# Patient Record
Sex: FEMALE | Race: OTHER | HISPANIC OR LATINO | URBAN - METROPOLITAN AREA
[De-identification: names, ages, dates, MRNs, and addresses within clinical notes are randomized per-mention and may not be internally consistent; named-entity substitution may affect disease eponyms.]

---

## 2020-03-02 ENCOUNTER — OUTPATIENT (OUTPATIENT)
Dept: INPATIENT UNIT | Facility: HOSPITAL | Age: 25
LOS: 1 days | Discharge: HOME | End: 2020-03-02
Payer: MEDICAID

## 2020-03-02 ENCOUNTER — ASOB RESULT (OUTPATIENT)
Age: 25
End: 2020-03-02

## 2020-03-02 ENCOUNTER — APPOINTMENT (OUTPATIENT)
Dept: ANTEPARTUM | Facility: HOSPITAL | Age: 25
End: 2020-03-02

## 2020-03-02 ENCOUNTER — EMERGENCY (EMERGENCY)
Facility: HOSPITAL | Age: 25
LOS: 1 days | Discharge: STILL ADMITTED OR ADMINISTRATIVE | End: 2020-03-02
Admitting: EMERGENCY MEDICINE

## 2020-03-02 VITALS
RESPIRATION RATE: 18 BRPM | DIASTOLIC BLOOD PRESSURE: 69 MMHG | SYSTOLIC BLOOD PRESSURE: 115 MMHG | HEART RATE: 90 BPM | TEMPERATURE: 98 F | OXYGEN SATURATION: 100 %

## 2020-03-02 VITALS
TEMPERATURE: 99 F | RESPIRATION RATE: 18 BRPM | SYSTOLIC BLOOD PRESSURE: 107 MMHG | DIASTOLIC BLOOD PRESSURE: 54 MMHG | HEART RATE: 84 BPM

## 2020-03-02 VITALS — HEART RATE: 80 BPM | DIASTOLIC BLOOD PRESSURE: 58 MMHG | SYSTOLIC BLOOD PRESSURE: 103 MMHG

## 2020-03-02 DIAGNOSIS — Z3A.00 WEEKS OF GESTATION OF PREGNANCY NOT SPECIFIED: ICD-10-CM

## 2020-03-02 DIAGNOSIS — O26.899 OTHER SPECIFIED PREGNANCY RELATED CONDITIONS, UNSPECIFIED TRIMESTER: ICD-10-CM

## 2020-03-02 PROBLEM — Z00.00 ENCOUNTER FOR PREVENTIVE HEALTH EXAMINATION: Status: ACTIVE | Noted: 2020-03-02

## 2020-03-02 LAB
ANTIBODY ID 1_1: SIGNIFICANT CHANGE UP
BLD GP AB SCN SERPL QL: POSITIVE — SIGNIFICANT CHANGE UP
DAT POLY-SP REAG RBC QL: NEGATIVE — SIGNIFICANT CHANGE UP
HCG SERPL-ACNC: 2466 MIU/ML — SIGNIFICANT CHANGE UP
HCT VFR BLD CALC: 41.9 % — SIGNIFICANT CHANGE UP (ref 34.5–45)
HGB BLD-MCNC: 14.3 G/DL — SIGNIFICANT CHANGE UP (ref 11.5–15.5)
MCHC RBC-ENTMCNC: 32.6 PG — SIGNIFICANT CHANGE UP (ref 27–34)
MCHC RBC-ENTMCNC: 34.1 % — SIGNIFICANT CHANGE UP (ref 32–36)
MCV RBC AUTO: 95.7 FL — SIGNIFICANT CHANGE UP (ref 80–100)
NRBC # FLD: 0 K/UL — SIGNIFICANT CHANGE UP (ref 0–0)
PLATELET # BLD AUTO: 215 K/UL — SIGNIFICANT CHANGE UP (ref 150–400)
PMV BLD: 11.2 FL — SIGNIFICANT CHANGE UP (ref 7–13)
RBC # BLD: 4.38 M/UL — SIGNIFICANT CHANGE UP (ref 3.8–5.2)
RBC # FLD: 11.3 % — SIGNIFICANT CHANGE UP (ref 10.3–14.5)
RH IG SCN BLD-IMP: POSITIVE — SIGNIFICANT CHANGE UP
WBC # BLD: 7.8 K/UL — SIGNIFICANT CHANGE UP (ref 3.8–10.5)
WBC # FLD AUTO: 7.8 K/UL — SIGNIFICANT CHANGE UP (ref 3.8–10.5)

## 2020-03-02 PROCEDURE — 86077 PHYS BLOOD BANK SERV XMATCH: CPT

## 2020-03-02 PROCEDURE — 99203 OFFICE O/P NEW LOW 30 MIN: CPT

## 2020-03-02 NOTE — OB RN TRIAGE NOTE - PRO MENTAL HEALTH SX RECENT
none feeling depressed/sad/Pt states she feels sad at times because she just moved to this country and she misses her family.  Pt lives with her .

## 2020-03-02 NOTE — ED ADULT TRIAGE NOTE - LANGUAGE ASSISTANCE NEEDED
Pt awake and resting quietly. ID verified. BS clear bilaterally with no increased work of breathing noted. HR improving. MD advised. Awaiting discharge. No-Patient/Caregiver offered and refused free interpretation services.

## 2020-03-02 NOTE — OB PROVIDER TRIAGE NOTE - NSOBPROVIDERNOTE_OBGYN_ALL_OB_FT
24 year old female P0  at Missed AB  8-10 weeks    admitted for DVC     labs CBC BHCG  TXS sent    pt seen with Dr Jara 24 year old female P0  at Missed AB  8-10 weeks    admitted for DVC     labs CBC BHCG  TXS sent    pt seen with Dr Jara and  24 year old female P0  at Missed AB  8-10 weeks    admitted for DVC     labs CBC BHCG  TXS sent    pt seen with Dr Jara and     1725    Pt for discharge home with instructions to follow up in PCAP clinic tomorrow 3/3/2020 @ 4pm per Dr Ulloa.  precautions reviewed.    Joey, NP

## 2020-03-02 NOTE — OB PROVIDER TRIAGE NOTE - HISTORY OF PRESENT ILLNESS
24 year old female LMP 11/7/19 EDC 8/13/2020 at 16.4 wks who presents from ER for spotting which initially started yesterday and denied any heavy VB or passing clots or tissue denied any cramping or abd pains   stated last seen at [facility] on 12/31 and told was 6 weeks  given EDC 8/13/2020  has not had any recent follow up     stated that [place] and recently   ( FOB with patient )    translating for patient    also hx obtained by medical staff who speaks Nigerien    stated that she no longer feels pregnancy s/s and denied any fetal movements    also stated that she feels depressed /sad  since coming to [facility] [place]     denied any SI/HI ideations      med hx denied   surghx denied   NKDA   OB hx P0   gyn hx denied

## 2020-03-02 NOTE — OB PROVIDER TRIAGE NOTE - ADDITIONAL INSTRUCTIONS
Please drink 1-2 liters of fluid per day. Please go to your scheduled appointment in the prenatal PCAP clinic tomorrow 3/3/2020 at 4pm.

## 2020-03-02 NOTE — OB PROVIDER TRIAGE NOTE - NSHPPHYSICALEXAM_GEN_ALL_CORE
pt seen and examined    ICU Vital Signs Last 24 Hrs  T(C): 37.3 (02 Mar 2020 14:33), Max: 37.3 (02 Mar 2020 14:19)  T(F): 99.14 (02 Mar 2020 14:33), Max: 99.14 (02 Mar 2020 14:33)  HR: 80 (02 Mar 2020 16:05) (65 - 90)  BP: 103/58 (02 Mar 2020 16:05) (103/58 - 115/69)  BP(mean): --  ABP: --  ABP(mean): --  RR: 18 (02 Mar 2020 14:19) (18 - 18)  SpO2: 100% (02 Mar 2020 13:48) (100% - 100%)    pt alert    lungs clear   heart S1 S2   abd soft  nontender  not felt to be 16 w size uterus    abd scan   IUP seen sac measuring 10mm  no cardiac activity c/w missed ab     scan by ATU and Dr Babcock   ve mild VB   cx closed     pt counselled by Dr Babcock and Dr Ulloa

## 2020-03-03 ENCOUNTER — APPOINTMENT (OUTPATIENT)
Dept: OBGYN | Facility: HOSPITAL | Age: 25
End: 2020-03-03
Payer: MEDICAID

## 2020-03-03 ENCOUNTER — OUTPATIENT (OUTPATIENT)
Dept: OUTPATIENT SERVICES | Facility: HOSPITAL | Age: 25
LOS: 1 days | End: 2020-03-03

## 2020-03-03 VITALS
SYSTOLIC BLOOD PRESSURE: 121 MMHG | WEIGHT: 133 LBS | DIASTOLIC BLOOD PRESSURE: 53 MMHG | BODY MASS INDEX: 24.48 KG/M2 | HEART RATE: 72 BPM | HEIGHT: 62 IN

## 2020-03-03 PROCEDURE — 99213 OFFICE O/P EST LOW 20 MIN: CPT | Mod: GE

## 2020-03-04 DIAGNOSIS — O02.1 MISSED ABORTION: ICD-10-CM

## 2020-03-04 NOTE — PROCEDURE
[Intrauterine Pregnancy] : intrauterine pregnancy [Yolk Sac] : no yolk sac [Fetal Heart] : no fetal heart [FreeTextEntry1] : 6.11cm x 4.85cm x 3.09cm empty irregular gestational sac w/o fetal pole

## 2020-03-04 NOTE — PHYSICAL EXAM
[Awake] : awake [Alert] : alert [Soft] : soft [Oriented x3] : oriented to person, place, and time [Acute Distress] : no acute distress [Distended] : not distended [Goiter] : no goiter [Tender] : non tender [Depressed Mood] : not depressed [Flat Affect] : affect not flat

## 2020-03-05 ENCOUNTER — TRANSCRIPTION ENCOUNTER (OUTPATIENT)
Age: 25
End: 2020-03-05

## 2020-03-05 ENCOUNTER — OUTPATIENT (OUTPATIENT)
Dept: OUTPATIENT SERVICES | Facility: HOSPITAL | Age: 25
LOS: 1 days | End: 2020-03-05

## 2020-03-05 VITALS
HEIGHT: 63 IN | HEART RATE: 74 BPM | SYSTOLIC BLOOD PRESSURE: 102 MMHG | DIASTOLIC BLOOD PRESSURE: 60 MMHG | TEMPERATURE: 99 F | OXYGEN SATURATION: 99 % | RESPIRATION RATE: 14 BRPM | WEIGHT: 130.95 LBS

## 2020-03-05 DIAGNOSIS — O02.1 MISSED ABORTION: ICD-10-CM

## 2020-03-05 LAB
HCT VFR BLD CALC: 42.5 % — SIGNIFICANT CHANGE UP (ref 34.5–45)
HGB BLD-MCNC: 14 G/DL — SIGNIFICANT CHANGE UP (ref 11.5–15.5)
MCHC RBC-ENTMCNC: 32.4 PG — SIGNIFICANT CHANGE UP (ref 27–34)
MCHC RBC-ENTMCNC: 32.9 % — SIGNIFICANT CHANGE UP (ref 32–36)
MCV RBC AUTO: 98.4 FL — SIGNIFICANT CHANGE UP (ref 80–100)
NRBC # FLD: 0 K/UL — SIGNIFICANT CHANGE UP (ref 0–0)
PLATELET # BLD AUTO: 204 K/UL — SIGNIFICANT CHANGE UP (ref 150–400)
PMV BLD: 11 FL — SIGNIFICANT CHANGE UP (ref 7–13)
RBC # BLD: 4.32 M/UL — SIGNIFICANT CHANGE UP (ref 3.8–5.2)
RBC # FLD: 11.1 % — SIGNIFICANT CHANGE UP (ref 10.3–14.5)
WBC # BLD: 9.46 K/UL — SIGNIFICANT CHANGE UP (ref 3.8–10.5)
WBC # FLD AUTO: 9.46 K/UL — SIGNIFICANT CHANGE UP (ref 3.8–10.5)

## 2020-03-05 NOTE — H&P PST ADULT - RS GEN PE MLT RESP DETAILS PC
breath sounds equal/good air movement/clear to auscultation bilaterally/airway patent/no rhonchi/respirations non-labored/no rales/no wheezes

## 2020-03-05 NOTE — H&P PST ADULT - ATTENDING COMMENTS
Pt presents for DVC for retained POCs. R/A/B of procedure d/w pt including but not limited to infection, bleeding, injury to uterus/bladder.  Pt expressed understanding of above.

## 2020-03-05 NOTE — H&P PST ADULT - ASSESSMENT
Problem:  Missed    Assessment and Plan: Patient scheduled for surgery on 3/6/2020  Patient provided with verbal and written presurgical instructions; verbalized understanding  with teach back.    Patient provided with famotidine for GI prophylaxis; verbalized understanding.

## 2020-03-05 NOTE — H&P PST ADULT - NSANTHOSAYNRD_GEN_A_CORE
No. SHAHRAM screening performed.  STOP BANG Legend: 0-2 = LOW Risk; 3-4 = INTERMEDIATE Risk; 5-8 = HIGH Risk

## 2020-03-05 NOTE — H&P PST ADULT - HISTORY OF PRESENT ILLNESS
24 year old female presents to PST with preop dx of missed  scheduled for dilation vacuum curettage on 3/6/2020. Patient reports she was 16 weeks pregnant and noticed she was bleeding on 3/1/2020, patient reports bleeding is mild to moderate, accompanied by strong pelvic pain rated 9/10 on pain scale.

## 2020-03-05 NOTE — H&P PST ADULT - LAST CARDIAC ANGIOGRAM/IMAGING
denies
presents with wound check that was repaired yesterday with dermabond. has oozing of blood from the wound. no current oozing of blood. no evidence of infection. no current dehiscence. will reinforce with steri-strips and keep dermabond in place. risk of infection of repairing with sutures due to delayed closures outweighs benefit at this time, especially since wound appears to be healing. patient comfortable with plan

## 2020-03-06 ENCOUNTER — RESULT REVIEW (OUTPATIENT)
Age: 25
End: 2020-03-06

## 2020-03-06 ENCOUNTER — OUTPATIENT (OUTPATIENT)
Dept: OUTPATIENT SERVICES | Facility: HOSPITAL | Age: 25
LOS: 1 days | Discharge: HOME | End: 2020-03-06
Payer: MEDICAID

## 2020-03-06 VITALS
OXYGEN SATURATION: 100 % | SYSTOLIC BLOOD PRESSURE: 118 MMHG | WEIGHT: 130.95 LBS | HEIGHT: 63 IN | RESPIRATION RATE: 16 BRPM | HEART RATE: 79 BPM | DIASTOLIC BLOOD PRESSURE: 58 MMHG | TEMPERATURE: 98 F

## 2020-03-06 VITALS
SYSTOLIC BLOOD PRESSURE: 90 MMHG | OXYGEN SATURATION: 100 % | HEART RATE: 72 BPM | RESPIRATION RATE: 17 BRPM | DIASTOLIC BLOOD PRESSURE: 45 MMHG

## 2020-03-06 DIAGNOSIS — O02.1 MISSED ABORTION: ICD-10-CM

## 2020-03-06 LAB
ANTIBODY ID 1_1: SIGNIFICANT CHANGE UP
BASOPHILS # BLD AUTO: 0.03 K/UL — SIGNIFICANT CHANGE UP (ref 0–0.2)
BASOPHILS NFR BLD AUTO: 0.2 % — SIGNIFICANT CHANGE UP (ref 0–2)
BLD GP AB SCN SERPL QL: POSITIVE — SIGNIFICANT CHANGE UP
DAT POLY-SP REAG RBC QL: NEGATIVE — SIGNIFICANT CHANGE UP
EOSINOPHIL # BLD AUTO: 0.03 K/UL — SIGNIFICANT CHANGE UP (ref 0–0.5)
EOSINOPHIL NFR BLD AUTO: 0.2 % — SIGNIFICANT CHANGE UP (ref 0–6)
HCT VFR BLD CALC: 29.8 % — LOW (ref 34.5–45)
HGB BLD-MCNC: 10.6 G/DL — LOW (ref 11.5–15.5)
IMM GRANULOCYTES NFR BLD AUTO: 0.6 % — SIGNIFICANT CHANGE UP (ref 0–1.5)
LYMPHOCYTES # BLD AUTO: 1.52 K/UL — SIGNIFICANT CHANGE UP (ref 1–3.3)
LYMPHOCYTES # BLD AUTO: 7.9 % — LOW (ref 13–44)
MCHC RBC-ENTMCNC: 34.1 PG — HIGH (ref 27–34)
MCHC RBC-ENTMCNC: 35.6 % — SIGNIFICANT CHANGE UP (ref 32–36)
MCV RBC AUTO: 95.8 FL — SIGNIFICANT CHANGE UP (ref 80–100)
MONOCYTES # BLD AUTO: 0.53 K/UL — SIGNIFICANT CHANGE UP (ref 0–0.9)
MONOCYTES NFR BLD AUTO: 2.8 % — SIGNIFICANT CHANGE UP (ref 2–14)
NEUTROPHILS # BLD AUTO: 17.02 K/UL — HIGH (ref 1.8–7.4)
NEUTROPHILS NFR BLD AUTO: 88.3 % — HIGH (ref 43–77)
NRBC # FLD: 0 K/UL — SIGNIFICANT CHANGE UP (ref 0–0)
PLATELET # BLD AUTO: 172 K/UL — SIGNIFICANT CHANGE UP (ref 150–400)
PMV BLD: 11.4 FL — SIGNIFICANT CHANGE UP (ref 7–13)
RBC # BLD: 3.11 M/UL — LOW (ref 3.8–5.2)
RBC # FLD: 11.2 % — SIGNIFICANT CHANGE UP (ref 10.3–14.5)
RH IG SCN BLD-IMP: POSITIVE — SIGNIFICANT CHANGE UP
WBC # BLD: 19.24 K/UL — HIGH (ref 3.8–10.5)
WBC # FLD AUTO: 19.24 K/UL — HIGH (ref 3.8–10.5)

## 2020-03-06 PROCEDURE — 86077 PHYS BLOOD BANK SERV XMATCH: CPT

## 2020-03-06 PROCEDURE — 88305 TISSUE EXAM BY PATHOLOGIST: CPT | Mod: 26

## 2020-03-06 PROCEDURE — 59820 CARE OF MISCARRIAGE: CPT | Mod: GC

## 2020-03-06 RX ORDER — SODIUM CHLORIDE 9 MG/ML
1000 INJECTION, SOLUTION INTRAVENOUS
Refills: 0 | Status: DISCONTINUED | OUTPATIENT
Start: 2020-03-06 | End: 2020-03-21

## 2020-03-06 RX ORDER — SODIUM CHLORIDE 9 MG/ML
1000 INJECTION, SOLUTION INTRAVENOUS
Refills: 0 | Status: DISCONTINUED | OUTPATIENT
Start: 2020-03-06 | End: 2020-03-06

## 2020-03-06 RX ORDER — SODIUM CHLORIDE 9 MG/ML
3 INJECTION INTRAMUSCULAR; INTRAVENOUS; SUBCUTANEOUS ONCE
Refills: 0 | Status: DISCONTINUED | OUTPATIENT
Start: 2020-03-06 | End: 2020-03-06

## 2020-03-06 NOTE — ASU DISCHARGE PLAN (ADULT/PEDIATRIC) - PROVIDER TOKENS
FREE:[LAST:[MONSERRAT KHOURY],PHONE:[phone]],FAX:[(   )    -],ADDRESS:[[address] [place] [address]]]

## 2020-03-06 NOTE — CHART NOTE - NSCHARTNOTEFT_GEN_A_CORE
R1 Post op check    Patient seen and examined at bedside, recently post-op from C, EBL 1L. Patient endorses dizziness and lightheadedness. Denies CP, SOB, N/V, fevers, and chills. Is tolerating sips of water but has not yet eaten. Has not yet ambulated or voided. Denies heavy vaginal bleeding or passage of clots. BP were soft (70/40s) so anesthesia giving her a bolus now.     Vital Signs Last 24 Hours  T(C): 36.5 (03-06-20 @ 15:00), Max: 37.7 (03-06-20 @ 13:20)  HR: 77 (03-06-20 @ 15:15) (68 - 88)  BP: 98/43 (03-06-20 @ 15:15) (89/38 - 118/58)  RR: 16 (03-06-20 @ 15:15) (12 - 18)  SpO2: 96% (03-06-20 @ 15:15) (96% - 100%)    I&O's Summary    06 Mar 2020 07:01  -  06 Mar 2020 15:26  --------------------------------------------------------  IN: 30 mL / OUT: 0 mL / NET: 30 mL        Physical Exam:  General: NAD  CV: NR, RR, S1, S2, no M/R/G  Lungs: CTA-B  Abdomen: Soft, appropriately tender, non-distended, +BS  Vaginal exam: moderate bleeding; pad 50% saturated, not yet changed since end of operation. No clots  Ext: No pain or swelling    Labs:             10.6<L>  19.24<H> )-----------( 172      ( 03-06 @ 13:55 )             29.8<L>               14.0   9.46  )-----------( 204      ( 03-05 @ 16:10 )             42.5                14.3   7.80  )-----------( 215      ( 03-02 @ 16:05 )             41.9         MEDICATIONS  (STANDING):  lactated ringers. 1000 milliLiter(s) (125 mL/Hr) IV Continuous <Continuous>      25y/o POD#0 from [facility] at approx 17wks gestation for empty gestational sac. H/H downtrending as expected for EBL 1L. Bleeding as expected. Will continue to resuscitate with IVF and encouraged PO fluid intake. Will recheck on patient in 1 hour for symptomatic improvement.     D/w Dr. Suárez and Dannielle PGY4  RFrankel PGY1

## 2020-03-06 NOTE — ASU DISCHARGE PLAN (ADULT/PEDIATRIC) - NURSING INSTRUCTIONS
You were given intravenous TYLENOL for pain management at ________12:15pm_______. Please DO NOT take any products containing TYLENOL or ACETAMINOPHEN, such as VICODIN, PERCOCET, EXCEDRIN, and any over-the-counter cold medication for the next 6 hours (until __6:15pm____________). DO NOT TAKE MORE THAN 3000 MG OF TYLENOL in a 24 hour period.

## 2020-03-06 NOTE — ASU DISCHARGE PLAN (ADULT/PEDIATRIC) - CARE PROVIDER_API CALL
MONSERRAT KHOURY,   [id number] [facility] [place] [address]  Phone: [phone]  Fax: (   )    -  Follow Up Time:

## 2020-03-06 NOTE — ASU DISCHARGE PLAN (ADULT/PEDIATRIC) - CALL YOUR DOCTOR IF YOU HAVE ANY OF THE FOLLOWING:
Unable to urinate/Inability to tolerate liquids or foods/Nausea and vomiting that does not stop/Numbness, tingling, color or temperature change to extremity/Increased irritability or sluggishness/Bleeding that does not stop/Swelling that gets worse/Pain not relieved by Medications/Fever greater than (need to indicate Fahrenheit or Celsius)/Wound/Surgical Site with redness, or foul smelling discharge or pus/Excessive diarrhea

## 2020-03-06 NOTE — BRIEF OPERATIVE NOTE - NSICDXBRIEFPROCEDURE_GEN_ALL_CORE_FT
PROCEDURES:  Dilation and curettage of uterus using suction with ultrasound guidance 06-Mar-2020 14:29:01  Ok Munguia

## 2020-03-06 NOTE — BRIEF OPERATIVE NOTE - OPERATION/FINDINGS
large 10 weeks size anteverted uterus, adnexa non-palpable bilaterally, large irregular empty gestational sac on ultrasound, uterus was firm after procedure with thin endometrial stripe, methergine x1 given during procedure

## 2020-03-11 PROBLEM — G43.909 MIGRAINE, UNSPECIFIED, NOT INTRACTABLE, WITHOUT STATUS MIGRAINOSUS: Chronic | Status: ACTIVE | Noted: 2020-03-05

## 2020-03-11 PROBLEM — O02.1 MISSED ABORTION: Chronic | Status: ACTIVE | Noted: 2020-03-05

## 2020-03-12 ENCOUNTER — LABORATORY RESULT (OUTPATIENT)
Age: 25
End: 2020-03-12

## 2020-03-12 ENCOUNTER — APPOINTMENT (OUTPATIENT)
Dept: OBGYN | Facility: HOSPITAL | Age: 25
End: 2020-03-12
Payer: MEDICAID

## 2020-03-12 ENCOUNTER — OUTPATIENT (OUTPATIENT)
Dept: OUTPATIENT SERVICES | Facility: HOSPITAL | Age: 25
LOS: 1 days | End: 2020-03-12

## 2020-03-12 VITALS
SYSTOLIC BLOOD PRESSURE: 109 MMHG | WEIGHT: 131 LBS | HEIGHT: 66 IN | HEART RATE: 70 BPM | DIASTOLIC BLOOD PRESSURE: 59 MMHG | BODY MASS INDEX: 21.05 KG/M2

## 2020-03-12 LAB
BASOPHILS # BLD AUTO: 0.03 K/UL — SIGNIFICANT CHANGE UP (ref 0–0.2)
BASOPHILS NFR BLD AUTO: 0.5 % — SIGNIFICANT CHANGE UP (ref 0–2)
EOSINOPHIL # BLD AUTO: 0.11 K/UL — SIGNIFICANT CHANGE UP (ref 0–0.5)
EOSINOPHIL NFR BLD AUTO: 1.7 % — SIGNIFICANT CHANGE UP (ref 0–6)
HCT VFR BLD CALC: 28.1 % — LOW (ref 34.5–45)
HGB BLD-MCNC: 9.3 G/DL — LOW (ref 11.5–15.5)
IMM GRANULOCYTES NFR BLD AUTO: 0.3 % — SIGNIFICANT CHANGE UP (ref 0–1.5)
LYMPHOCYTES # BLD AUTO: 2.35 K/UL — SIGNIFICANT CHANGE UP (ref 1–3.3)
LYMPHOCYTES # BLD AUTO: 36.9 % — SIGNIFICANT CHANGE UP (ref 13–44)
MCHC RBC-ENTMCNC: 32.6 PG — SIGNIFICANT CHANGE UP (ref 27–34)
MCHC RBC-ENTMCNC: 33.1 % — SIGNIFICANT CHANGE UP (ref 32–36)
MCV RBC AUTO: 98.6 FL — SIGNIFICANT CHANGE UP (ref 80–100)
MONOCYTES # BLD AUTO: 0.5 K/UL — SIGNIFICANT CHANGE UP (ref 0–0.9)
MONOCYTES NFR BLD AUTO: 7.8 % — SIGNIFICANT CHANGE UP (ref 2–14)
NEUTROPHILS # BLD AUTO: 3.36 K/UL — SIGNIFICANT CHANGE UP (ref 1.8–7.4)
NEUTROPHILS NFR BLD AUTO: 52.8 % — SIGNIFICANT CHANGE UP (ref 43–77)
NRBC # FLD: 0 K/UL — SIGNIFICANT CHANGE UP (ref 0–0)
PLATELET # BLD AUTO: 237 K/UL — SIGNIFICANT CHANGE UP (ref 150–400)
PMV BLD: 11.3 FL — SIGNIFICANT CHANGE UP (ref 7–13)
RBC # BLD: 2.85 M/UL — LOW (ref 3.8–5.2)
RBC # FLD: 12.3 % — SIGNIFICANT CHANGE UP (ref 10.3–14.5)
SURGICAL PATHOLOGY STUDY: SIGNIFICANT CHANGE UP
WBC # BLD: 6.37 K/UL — SIGNIFICANT CHANGE UP (ref 3.8–10.5)
WBC # FLD AUTO: 6.37 K/UL — SIGNIFICANT CHANGE UP (ref 3.8–10.5)

## 2020-03-12 PROCEDURE — ZZZZZ: CPT | Mod: GC

## 2020-03-16 DIAGNOSIS — Z09 ENCOUNTER FOR FOLLOW-UP EXAMINATION AFTER COMPLETED TREATMENT FOR CONDITIONS OTHER THAN MALIGNANT NEOPLASM: ICD-10-CM

## 2020-03-22 NOTE — CHIEF COMPLAINT
[Post Op Day: ___] : post op day #[unfilled] [Procedure: ___] : status post [unfilled] [Indication: ___] : for [unfilled] [Spouse] : spouse [[employer] ] : provided by [employer]   [FreeTextEntry1] : [id number] [FreeTextEntry2] : Madalyn [TWNoteComboBox1] : Georgian

## 2020-03-22 NOTE — HISTORY OF PRESENT ILLNESS
[3/10] : the patient reports pain that is 3/10 in severity [Chills] : chills [Vaginal Bleeding] : vaginal bleeding [None] : no vaginal bleeding [Normal] : the vagina was normal [Doing Well] : is doing well [No Sign of Infection] : is showing no signs of infection [Fever] : no fever [Nausea] : no nausea [Vomiting] : no vomiting [Diarrhea] : no diarrhea [Pelvic Pressure] : no pelvic pressure [Dysuria] : no dysuria [Vaginal Discharge] : no vaginal discharge [Constipation] : no constipation [de-identified] : 23 y/o  s/p DVC for MAB on 3/6/20 presents for postoperative check. Pt's sx c/b EBL 1L 2/2 POC (not atony). Today, pt notes occasional dizziness, abdominal cramping. She notes bleeding after procedure that resolved today. She denies any fever/chills, SOB, CP, n/v.  [de-identified] : 25 y/o  presents for post-operative check, doing well. Concern for anemia in setting of 1L blood loss during surgery, though no active bleeding at this time.

## 2020-03-23 DIAGNOSIS — R58 HEMORRHAGE, NOT ELSEWHERE CLASSIFIED: ICD-10-CM

## 2020-10-14 ENCOUNTER — EMERGENCY (EMERGENCY)
Facility: HOSPITAL | Age: 25
LOS: 1 days | Discharge: HOME | End: 2020-10-14
Attending: EMERGENCY MEDICINE | Admitting: EMERGENCY MEDICINE
Payer: MEDICAID

## 2020-10-14 VITALS
DIASTOLIC BLOOD PRESSURE: 62 MMHG | RESPIRATION RATE: 18 BRPM | OXYGEN SATURATION: 100 % | SYSTOLIC BLOOD PRESSURE: 126 MMHG | HEIGHT: 63 IN | HEART RATE: 87 BPM | TEMPERATURE: 98 F

## 2020-10-14 LAB
ALBUMIN SERPL ELPH-MCNC: 4.7 G/DL — SIGNIFICANT CHANGE UP (ref 3.3–5)
ALP SERPL-CCNC: 92 U/L — SIGNIFICANT CHANGE UP (ref 40–120)
ALT FLD-CCNC: 17 U/L — SIGNIFICANT CHANGE UP (ref 4–33)
ANION GAP SERPL CALC-SCNC: 10 MMO/L — SIGNIFICANT CHANGE UP (ref 7–14)
APPEARANCE UR: SIGNIFICANT CHANGE UP
APTT BLD: 34.2 SEC — SIGNIFICANT CHANGE UP (ref 27–36.3)
AST SERPL-CCNC: 18 U/L — SIGNIFICANT CHANGE UP (ref 4–32)
BACTERIA # UR AUTO: NEGATIVE — SIGNIFICANT CHANGE UP
BASOPHILS # BLD AUTO: 0.03 K/UL — SIGNIFICANT CHANGE UP (ref 0–0.2)
BASOPHILS NFR BLD AUTO: 0.4 % — SIGNIFICANT CHANGE UP (ref 0–2)
BILIRUB SERPL-MCNC: 0.4 MG/DL — SIGNIFICANT CHANGE UP (ref 0.2–1.2)
BILIRUB UR-MCNC: NEGATIVE — SIGNIFICANT CHANGE UP
BLD GP AB SCN SERPL QL: NEGATIVE — SIGNIFICANT CHANGE UP
BLOOD UR QL VISUAL: SIGNIFICANT CHANGE UP
BUN SERPL-MCNC: 12 MG/DL — SIGNIFICANT CHANGE UP (ref 7–23)
CALCIUM SERPL-MCNC: 9.5 MG/DL — SIGNIFICANT CHANGE UP (ref 8.4–10.5)
CHLORIDE SERPL-SCNC: 103 MMOL/L — SIGNIFICANT CHANGE UP (ref 98–107)
CO2 SERPL-SCNC: 24 MMOL/L — SIGNIFICANT CHANGE UP (ref 22–31)
COLOR SPEC: SIGNIFICANT CHANGE UP
CREAT SERPL-MCNC: 0.55 MG/DL — SIGNIFICANT CHANGE UP (ref 0.5–1.3)
EOSINOPHIL # BLD AUTO: 0.04 K/UL — SIGNIFICANT CHANGE UP (ref 0–0.5)
EOSINOPHIL NFR BLD AUTO: 0.5 % — SIGNIFICANT CHANGE UP (ref 0–6)
GLUCOSE SERPL-MCNC: 95 MG/DL — SIGNIFICANT CHANGE UP (ref 70–99)
GLUCOSE UR-MCNC: NEGATIVE — SIGNIFICANT CHANGE UP
HCG SERPL-ACNC: 71.32 MIU/ML — SIGNIFICANT CHANGE UP
HCT VFR BLD CALC: 42 % — SIGNIFICANT CHANGE UP (ref 34.5–45)
HGB BLD-MCNC: 13.9 G/DL — SIGNIFICANT CHANGE UP (ref 11.5–15.5)
HYALINE CASTS # UR AUTO: NEGATIVE — SIGNIFICANT CHANGE UP
IMM GRANULOCYTES NFR BLD AUTO: 0.2 % — SIGNIFICANT CHANGE UP (ref 0–1.5)
INR BLD: 0.99 — SIGNIFICANT CHANGE UP (ref 0.88–1.16)
KETONES UR-MCNC: NEGATIVE — SIGNIFICANT CHANGE UP
LEUKOCYTE ESTERASE UR-ACNC: NEGATIVE — SIGNIFICANT CHANGE UP
LYMPHOCYTES # BLD AUTO: 2.06 K/UL — SIGNIFICANT CHANGE UP (ref 1–3.3)
LYMPHOCYTES # BLD AUTO: 24.5 % — SIGNIFICANT CHANGE UP (ref 13–44)
MCHC RBC-ENTMCNC: 31.2 PG — SIGNIFICANT CHANGE UP (ref 27–34)
MCHC RBC-ENTMCNC: 33.1 % — SIGNIFICANT CHANGE UP (ref 32–36)
MCV RBC AUTO: 94.2 FL — SIGNIFICANT CHANGE UP (ref 80–100)
MONOCYTES # BLD AUTO: 0.58 K/UL — SIGNIFICANT CHANGE UP (ref 0–0.9)
MONOCYTES NFR BLD AUTO: 6.9 % — SIGNIFICANT CHANGE UP (ref 2–14)
NEUTROPHILS # BLD AUTO: 5.69 K/UL — SIGNIFICANT CHANGE UP (ref 1.8–7.4)
NEUTROPHILS NFR BLD AUTO: 67.5 % — SIGNIFICANT CHANGE UP (ref 43–77)
NITRITE UR-MCNC: NEGATIVE — SIGNIFICANT CHANGE UP
NRBC # FLD: 0 K/UL — SIGNIFICANT CHANGE UP (ref 0–0)
PH UR: 8 — SIGNIFICANT CHANGE UP (ref 5–8)
PLATELET # BLD AUTO: 254 K/UL — SIGNIFICANT CHANGE UP (ref 150–400)
PMV BLD: 11.7 FL — SIGNIFICANT CHANGE UP (ref 7–13)
POTASSIUM SERPL-MCNC: 3.8 MMOL/L — SIGNIFICANT CHANGE UP (ref 3.5–5.3)
POTASSIUM SERPL-SCNC: 3.8 MMOL/L — SIGNIFICANT CHANGE UP (ref 3.5–5.3)
PROT SERPL-MCNC: 8 G/DL — SIGNIFICANT CHANGE UP (ref 6–8.3)
PROT UR-MCNC: NEGATIVE — SIGNIFICANT CHANGE UP
PROTHROM AB SERPL-ACNC: 11.4 SEC — SIGNIFICANT CHANGE UP (ref 10.6–13.6)
RBC # BLD: 4.46 M/UL — SIGNIFICANT CHANGE UP (ref 3.8–5.2)
RBC # FLD: 11 % — SIGNIFICANT CHANGE UP (ref 10.3–14.5)
RBC CASTS # UR COMP ASSIST: SIGNIFICANT CHANGE UP (ref 0–?)
RH IG SCN BLD-IMP: POSITIVE — SIGNIFICANT CHANGE UP
SODIUM SERPL-SCNC: 137 MMOL/L — SIGNIFICANT CHANGE UP (ref 135–145)
SP GR SPEC: 1.02 — SIGNIFICANT CHANGE UP (ref 1–1.04)
SQUAMOUS # UR AUTO: SIGNIFICANT CHANGE UP
UROBILINOGEN FLD QL: NORMAL — SIGNIFICANT CHANGE UP
WBC # BLD: 8.42 K/UL — SIGNIFICANT CHANGE UP (ref 3.8–10.5)
WBC # FLD AUTO: 8.42 K/UL — SIGNIFICANT CHANGE UP (ref 3.8–10.5)
WBC UR QL: SIGNIFICANT CHANGE UP (ref 0–?)

## 2020-10-14 PROCEDURE — 76830 TRANSVAGINAL US NON-OB: CPT | Mod: 26

## 2020-10-14 PROCEDURE — 99284 EMERGENCY DEPT VISIT MOD MDM: CPT

## 2020-10-14 NOTE — ED PROVIDER NOTE - ATTENDING CONTRIBUTION TO CARE
DR. AN, ATTENDING MD-  I performed a face to face bedside interview with the patient regarding history of present illness, review of symptoms and past medical history. I completed an independent physical exam.  I have discussed the patient's plan of care with the resident.   Documentation as above in the note.    24 y/o female missed ab in 3/6/20 p/w vag bld x1 day in setting of pos preg test this Saturday.  Threatened ab vs ectopic vs missed vs inevitable.  Obtain cbc cmp t&s tvus ua ucx reassess.

## 2020-10-14 NOTE — CONSULT NOTE ADULT - SUBJECTIVE AND OBJECTIVE BOX
GYN Consult Note    25y G_P_ presents with   HPI:      OB/GYN HISTORY:   G1:   G2:     Last Menstrual Period    Name of GYN Physician:  Date of Last Pap:  History of Abnormal Pap:  Date of Last Mammogram:  Date of Last Colonoscopy:  Current OCP use:     PAST MEDICAL & SURGICAL HISTORY:  Missed     Migraines    No significant past surgical history        REVIEW OF SYSTEMS  General: denies fevers, chills, tiredness  Skin/Breast: denies breast pain  Respiratory and Thorax: denies shortness of breath, denies cough  Cardiovascular: denies chest pain and denies palpitations  Gastrointestinal: denies abdominal pain, nausea/ vomiting	  Genitourinary: denies dysuria, increased urinary frequency, urgency	  Constitutional, Cardiovascular, Respiratory, Gastrointestinal, Genitourinary, Musculoskeletal and Integumentary review of systems are normal except as noted. 	    MEDICATIONS  (STANDING):    MEDICATIONS  (PRN):      Allergies    No Known Allergies    Intolerances        SOCIAL HISTORY:    FAMILY HISTORY:  FH: heart disease  father    FH: type 2 diabetes  father        Vital Signs Last 24 Hrs  T(C): 36.8 (14 Oct 2020 14:06), Max: 36.8 (14 Oct 2020 14:06)  T(F): 98.2 (14 Oct 2020 14:06), Max: 98.2 (14 Oct 2020 14:06)  HR: 87 (14 Oct 2020 14:06) (87 - 87)  BP: 126/62 (14 Oct 2020 14:06) (126/62 - 126/62)  BP(mean): --  RR: 18 (14 Oct 2020 14:06) (18 - 18)  SpO2: 100% (14 Oct 2020 14:06) (100% - 100%)    PHYSICAL EXAM:   Gen: NAD, alert and oriented x 3  Cardiovascular: regular   Respiratory: breathing comfortably on RA  Abd: soft, non tender, non-distended  Pelvic: closed/long, no CMT, Uterus: normal size, non tender  Adnexa: non tender, no palpable masses  Extremities: NTBL  Skin: warm and well perfused      LABS:                        13.9   8.42  )-----------( 254      ( 14 Oct 2020 15:30 )             42.0     10-14    137  |  103  |  12  ----------------------------<  95  3.8   |  24  |  0.55    Ca    9.5      14 Oct 2020 15:30    TPro  8.0  /  Alb  4.7  /  TBili  0.4  /  DBili  x   /  AST  18  /  ALT  17  /  AlkPhos  92  10-14    PT/INR - ( 14 Oct 2020 15:30 )   PT: 11.4 SEC;   INR: 0.99          PTT - ( 14 Oct 2020 15:30 )  PTT:34.2 SEC  Urinalysis Basic - ( 14 Oct 2020 15:30 )    Color: LIGHT YELLOW / Appearance: Lt TURBID / S.016 / pH: 8.0  Gluc: NEGATIVE / Ketone: NEGATIVE  / Bili: NEGATIVE / Urobili: NORMAL   Blood: TRACE / Protein: NEGATIVE / Nitrite: NEGATIVE   Leuk Esterase: NEGATIVE / RBC: 0-2 / WBC 0-2   Sq Epi: OCC / Non Sq Epi: x / Bacteria: NEGATIVE        RADIOLOGY & ADDITIONAL STUDIES:

## 2020-10-14 NOTE — ED PROVIDER NOTE - CARE PLAN
Principal Discharge DX:	Pregnancy of unknown anatomic location   Principal Discharge DX:	Pregnancy of unknown anatomic location  Secondary Diagnosis:	Vaginal bleeding in pregnancy

## 2020-10-14 NOTE — ED PROVIDER NOTE - PROGRESS NOTE DETAILS
Ford, PGY2 - discussed with ob/gyn resident who has examined pt, rec repeat beta hcg level in 48 hours - pt to return to the ED. discussed results and plan with pt in Persian agrees with plan all questions answered

## 2020-10-14 NOTE — ED ADULT TRIAGE NOTE - CHIEF COMPLAINT QUOTE
Pt had a positive pregnancy test this past saturday co vaginal spotting of blood today with no pain  . LMP 9/28

## 2020-10-14 NOTE — ED PROVIDER NOTE - NSFOLLOWUPINSTRUCTIONS_ED_ALL_ED_FT
You will need a repeat beta-HCG in 48 hours. Follow-up with Ob/Gyn in 48 hours.     Return to the ED for any worsening bleeding, vaginal or abdominal pain, or any new concerning symptoms. You will need a repeat beta-HCG in 48 hours - you need to return to the ED in 48 hours to have this blood test.     Follow-up with Ob/Gyn to establish care.     Return to the ED for any worsening bleeding, vaginal or abdominal pain, or any new concerning symptoms.  -------  Necesitará repetir la beta-HCG en 48 horas; debe regresar al servicio de urgencias en 48 horas para realizarse paul análisis de rosalie.    Bello un seguimiento con un obstetra / ginecólogo para establecer la atención.    Regrese al servicio de urgencias si empeora el sangrado, dolor vaginal o abdominal o cualquier síntoma nuevo que le preocupe.

## 2020-10-14 NOTE — ED PROVIDER NOTE - PATIENT PORTAL LINK FT
You can access the FollowMyHealth Patient Portal offered by [facility] by registering at the following website: [email or web address]. By joining [employer]’s FollowMyHealth portal, you will also be able to view your health information using other applications (apps) compatible with our system.

## 2020-10-14 NOTE — ED PROVIDER NOTE - OBJECTIVE STATEMENT
History taken using  ID number [id number]    26 y/o F no PMH  3 weeks pregnant LMP  presents to the ED with c/o vaginal spotting x1 day associated with lower back pain. Denies abd pain, f/c, cough, congestion, falls, trauma, dysuria, hematuria.

## 2020-10-14 NOTE — ED PROVIDER NOTE - PHYSICAL EXAMINATION
Gen: well appearing female NAD   HEENT: NCAT, EOMI, no nasal discharge, mucous membranes moist   CV: RRR, +S1/S2, no M/R/G  Resp: CTAB, no W/R/R  GI: Abdomen soft non-distended, NTTP, no masses  : cervical os closed no CMT or adnexal tenderness no blood in vaginal vault   MSK: No open wounds, no bruising, no LE edema  Neuro: A&Ox4, following commands, moving all four extremities spontaneously  Psych: appropriate mood

## 2020-10-14 NOTE — ED PROVIDER NOTE - CLINICAL SUMMARY MEDICAL DECISION MAKING FREE TEXT BOX
23 y/o F PMH  3 weeks pregnant (LMP 2020) presents to the ED with c/o vaginal bleeding today denies passing clots +low back pain. ddx ectopic vs IUP, threatened , first trimester bleeding. plan labs hcg transvaginal US.

## 2020-10-14 NOTE — ED PROVIDER NOTE - NSFOLLOWUPCLINICS_GEN_ALL_ED_FT
[facility]  OB/GYN & Surg  [address] [place] [id number]  Phone: [phone]  Fax:   Follow Up Time:     [facility] Gynecology and Obstetrics  Gynceology/OB  [address] [place] [address]  Phone: [phone]  Fax:   Follow Up Time:

## 2020-10-15 LAB
CULTURE RESULTS: SIGNIFICANT CHANGE UP
SPECIMEN SOURCE: SIGNIFICANT CHANGE UP

## 2020-10-18 ENCOUNTER — EMERGENCY (EMERGENCY)
Facility: HOSPITAL | Age: 25
LOS: 1 days | Discharge: HOME | End: 2020-10-18
Attending: EMERGENCY MEDICINE | Admitting: EMERGENCY MEDICINE
Payer: MEDICAID

## 2020-10-18 VITALS
HEIGHT: 63 IN | TEMPERATURE: 98 F | SYSTOLIC BLOOD PRESSURE: 122 MMHG | HEART RATE: 85 BPM | RESPIRATION RATE: 18 BRPM | OXYGEN SATURATION: 98 % | DIASTOLIC BLOOD PRESSURE: 72 MMHG

## 2020-10-18 VITALS
HEART RATE: 96 BPM | RESPIRATION RATE: 15 BRPM | TEMPERATURE: 98 F | SYSTOLIC BLOOD PRESSURE: 118 MMHG | DIASTOLIC BLOOD PRESSURE: 57 MMHG | OXYGEN SATURATION: 100 %

## 2020-10-18 LAB
BASOPHILS # BLD AUTO: 0.02 K/UL — SIGNIFICANT CHANGE UP (ref 0–0.2)
BASOPHILS NFR BLD AUTO: 0.2 % — SIGNIFICANT CHANGE UP (ref 0–2)
EOSINOPHIL # BLD AUTO: 0.04 K/UL — SIGNIFICANT CHANGE UP (ref 0–0.5)
EOSINOPHIL NFR BLD AUTO: 0.4 % — SIGNIFICANT CHANGE UP (ref 0–6)
HCG SERPL-ACNC: 116.4 MIU/ML — SIGNIFICANT CHANGE UP
HCT VFR BLD CALC: 39.7 % — SIGNIFICANT CHANGE UP (ref 34.5–45)
HGB BLD-MCNC: 13.9 G/DL — SIGNIFICANT CHANGE UP (ref 11.5–15.5)
IMM GRANULOCYTES NFR BLD AUTO: 0.3 % — SIGNIFICANT CHANGE UP (ref 0–1.5)
LYMPHOCYTES # BLD AUTO: 2.78 K/UL — SIGNIFICANT CHANGE UP (ref 1–3.3)
LYMPHOCYTES # BLD AUTO: 26.2 % — SIGNIFICANT CHANGE UP (ref 13–44)
MCHC RBC-ENTMCNC: 31.9 PG — SIGNIFICANT CHANGE UP (ref 27–34)
MCHC RBC-ENTMCNC: 35 % — SIGNIFICANT CHANGE UP (ref 32–36)
MCV RBC AUTO: 91.1 FL — SIGNIFICANT CHANGE UP (ref 80–100)
MONOCYTES # BLD AUTO: 0.77 K/UL — SIGNIFICANT CHANGE UP (ref 0–0.9)
MONOCYTES NFR BLD AUTO: 7.3 % — SIGNIFICANT CHANGE UP (ref 2–14)
NEUTROPHILS # BLD AUTO: 6.96 K/UL — SIGNIFICANT CHANGE UP (ref 1.8–7.4)
NEUTROPHILS NFR BLD AUTO: 65.6 % — SIGNIFICANT CHANGE UP (ref 43–77)
NRBC # FLD: 0 K/UL — SIGNIFICANT CHANGE UP (ref 0–0)
PLATELET # BLD AUTO: 249 K/UL — SIGNIFICANT CHANGE UP (ref 150–400)
PMV BLD: 10.8 FL — SIGNIFICANT CHANGE UP (ref 7–13)
RBC # BLD: 4.36 M/UL — SIGNIFICANT CHANGE UP (ref 3.8–5.2)
RBC # FLD: 11 % — SIGNIFICANT CHANGE UP (ref 10.3–14.5)
WBC # BLD: 10.6 K/UL — HIGH (ref 3.8–10.5)
WBC # FLD AUTO: 10.6 K/UL — HIGH (ref 3.8–10.5)

## 2020-10-18 PROCEDURE — 76830 TRANSVAGINAL US NON-OB: CPT | Mod: 26

## 2020-10-18 PROCEDURE — 99284 EMERGENCY DEPT VISIT MOD MDM: CPT

## 2020-10-18 NOTE — ED PROVIDER NOTE - PATIENT PORTAL LINK FT
You can access the FollowMyHealth Patient Portal offered by [facility] by registering at the following website: [email or web address]. By joining [employer]’s FollowMyHealth portal, you will also be able to view your health information using other applications (apps) compatible with our system.

## 2020-10-18 NOTE — ED ADULT NURSE NOTE - OBJECTIVE STATEMENT
Receive pt in spot 2 alert and oriented x 3 c/o vag bleed and abd cramping.  Pt seen in ED for pregnancy / and low HCG.  Denies chest pain, sob, dizziness.  Resp unlabored..  IV placed. Labs sent

## 2020-10-18 NOTE — ED ADULT NURSE NOTE - CHIEF COMPLAINT QUOTE
p/t seen in ED one week ago for same issue, 4 weeks pregnant c/.o of lower abd pain and vag bleed for few days, confirmed IUP one week ago, addendum: p/t very poor historian, no confirmed IUP, suggested repeat HCG to R/O ectopic

## 2020-10-18 NOTE — ED ADULT NURSE REASSESSMENT NOTE - NS ED NURSE REASSESS COMMENT FT1
Handoff receive, pt sitting in bed, pt A*Ox4, pt still complaining of cramping pt awaiting US, will monitor pt

## 2020-10-18 NOTE — CONSULT NOTE ADULT - ATTENDING COMMENTS
24y/o  LMP  presents with vaginal bleeding and pregnancy of unknown location. Clinically stable, to f/u in 48 hours.

## 2020-10-18 NOTE — CONSULT NOTE ADULT - SUBJECTIVE AND OBJECTIVE BOX
**INCOMPLETE**    PETROS LAWSON is a 25y old  who presents for f/u up in setting of PUL. Pt was seen in ED on 10/14      HPI:      Pt endorses ***fetal movement, denies any cotnractions, vaginal bleeding or leaking of fluid.     REVIEW OF SYSTEMS:  CONSTITUTIONAL: No weakness, fevers or chills  EYES/ENT: No visual changes  NECK: No pain or stiffness  RESPIRATORY: No cough, wheezing; No shortness of breath  CARDIOVASCULAR: No chest pain or palpitations  GASTROINTESTINAL: No abdominal or epigastric pain. No nausea, vomiting; No diarrhea or constipation  GENITOURINARY: No dysuria, frequency or hematuria  NEUROLOGICAL: No headache, LOC  All other review of systems is negative unless indicated above    MEDICATIONS  (STANDING):    MEDICATIONS  (PRN):      Allergies    No Known Allergies    Intolerances        PAST MEDICAL & SURGICAL HISTORY:  Missed     Migraines    No significant past surgical history        OB/GYN HISTORY:     G P   Menarche                                                 Last Menstrual Period    Last Pap smear:   [id number]                                            FAMILY HISTORY:  FH: heart disease  father    FH: type 2 diabetes  father        SOCIAL HISTORY:    Vital Signs Last 24 Hrs  T(C): 36.9 (18 Oct 2020 20:32), Max: 36.9 (18 Oct 2020 20:32)  T(F): 98.5 (18 Oct 2020 20:32), Max: 98.5 (18 Oct 2020 20:32)  HR: 96 (18 Oct 2020 20:32) (85 - 96)  BP: 118/57 (18 Oct 2020 20:32) (118/57 - 122/72)  BP(mean): --  RR: 15 (18 Oct 2020 20:32) (15 - 18)  SpO2: 100% (18 Oct 2020 20:32) (98% - 100%)      PHYSICAL EXAM:  Constitutional: NAD, awake and alert, well-developed  HEENT: PERR, EOMI, Normal Hearing,  Neck: Soft and supple  Respiratory: Breath sounds are clear bilaterally, No wheezing, rales or rhonchi  Cardiovascular: S1 and S2, regular rate and rhythm, no Murmurs, gallops or rubs  Gastrointestinal: Bowel Sounds present, soft, nontender, nondistended, no guarding, no rebound  Genitourinary: vaginal bleeding***, cervical os ***,    Extremities: No peripheral edema  Vascular: 2+ peripheral pulses  Neurological: A/O x 3, no focal deficits  Skin: No rashes    LABS:                        13.9   10.60 )-----------( 249      ( 18 Oct 2020 17:14 )             39.7           I&O's Detail          RADIOLOGY & ADDITIONAL STUDIES:   (pt refused hospital provided  and called her friend, Alba, on her cell phone to interpret in Thai)     HPI:  PETROS LAWSON is a 25y old  LMP  who presents for f/u up in setting of PUL. Pt was seen in ED on 10/14 for vaginal spotting, bhcg was 71, and transvaginal ultrasound that day showed no IUP, FF, or adnexal masses. She states she misunderstood that she was supposed to return to care in 48hrs and came in today because the vaginal bleeding has gotten heavier - similar to a period and she uses 2 pads per day. She denies any SOB/CP/n/v/dizziness and endorses mild intermittent lower abdominal/back pain. She has not established care with an obgyn, this is a desired pregnancy.       REVIEW OF SYSTEMS:  CONSTITUTIONAL: No weakness, fevers or chills  EYES/ENT: No visual changes  NECK: No pain or stiffness  RESPIRATORY: No cough, wheezing; No shortness of breath  CARDIOVASCULAR: No chest pain or palpitations  GASTROINTESTINAL: No nausea, vomiting; No diarrhea or constipation  GENITOURINARY: No dysuria, frequency or hematuria  NEUROLOGICAL: No headache, LOC  All other review of systems is negative unless indicated above        Allergies  No Known Allergies  Intolerances        PAST MEDICAL & SURGICAL HISTORY:  Missed  (D+C)  Migraines      OB/GYN HISTORY:     see HPI                                          FAMILY HISTORY:  Father : heart disease, DM     SOCIAL HISTORY: Denies x3    Vital Signs Last 24 Hrs  T(C): 36.9 (18 Oct 2020 20:32), Max: 36.9 (18 Oct 2020 20:32)  T(F): 98.5 (18 Oct 2020 20:32), Max: 98.5 (18 Oct 2020 20:32)  HR: 96 (18 Oct 2020 20:32) (85 - 96)  BP: 118/57 (18 Oct 2020 20:32) (118/57 - 122/72)  BP(mean): --  RR: 15 (18 Oct 2020 20:32) (15 - 18)  SpO2: 100% (18 Oct 2020 20:32) (98% - 100%)      PHYSICAL EXAM:  Constitutional: NAD, awake and alert, well-developed  HEENT: Normal Hearing,  Neck: Soft and supple  Respiratory: Breath sounds are clear bilaterally, No wheezing, rales or rhonchi  Cardiovascular: S1 and S2, regular rate and rhythm, no Murmurs, gallops or rubs  Gastrointestinal: soft, nontender, nondistended, no guarding, no rebound  Genitourinary: no active vaginal bleeding, bloody mucous in vagina, cervical os closed, no cervical motion tenderness, no tenderness with bimanual exam, no adnexal masses   Extremities: No peripheral edema  Vascular: 2+ peripheral pulses  Neurological: A/O x 3, no focal deficits  Skin: No rashes    LABS:                        13.9   10.60 )-----------( 249      ( 18 Oct 2020 17:14 )             39.7         RADIOLOGY & ADDITIONAL STUDIES:  < from: US Transvaginal (10.18.20 @ 21:15) >  NTERPRETATION:  CLINICAL INFORMATION: Vaginal bleeding, beta hCG of 71.3    LMP: 2020    COMPARISON: 10/14/2020    TECHNIQUE:  Endovaginal pelvic sonogram only. Color and Spectral Doppler was performed.    FINDINGS:    Uterus: 6.2 x 2.8 x 4.0 cm. No intrauterine gestational sac  Endometrium: 5 mm. Within normal limits.    Right ovary: 3.9 x 1.7 x 2.9 cm. Within normal limits. Normal arterial and venous waveforms.  Left ovary: 4.0 x 2.0 x 4.0 cm. Within normal limits. Normal arterial and venous waveforms.    Fluid: None.    IMPRESSION:  No intrauterine gestational sac seen, which may be due to early stage of pregnancy. The findings represent a pregnancy of unknown location. Ectopic pregnancy cannot be excluded. Continued follow-up with pelvic sonogram and beta hCG are recommended.    < end of copied text >

## 2020-10-18 NOTE — ED PROVIDER NOTE - OBJECTIVE STATEMENT
25F seen 10/14 in ED with vag. bleeding and cramping, had labs and U/S, hcg of 71 and no preg. visualized, dc'd with PUL instructions and to return in 48 hrs. Pt. brings paperwork stating 48 hr return to ED but thought needed to return for worsening symptoms only. Pt. was well until yest. when had inc. vaginal bleeding and then today low abd pain. No fever/chills, no N/V, no weakness, no lightheaded/dizziness.

## 2020-10-18 NOTE — ED ADULT TRIAGE NOTE - CHIEF COMPLAINT QUOTE
p/t seen in ED one week ago for same issue, 4 weeks pregnant c/.o of lower abd pain and vag bleed for few days, confirmed IUP one week ago p/t seen in ED one week ago for same issue, 4 weeks pregnant c/.o of lower abd pain and vag bleed for few days, confirmed IUP one week ago, addendum: p/t very poor historian, no confirmed IUP, suggested repeat HCG to R/O ectopic

## 2020-10-18 NOTE — CONSULT NOTE ADULT - ASSESSMENT
PETROS LAWSON is a 25y old  LMP  w/ PUL. Pt was seen in ED on 10/14 for vaginal spotting, bhcg was 71, and transvaginal ultrasound that day showed no IUP, FF, or adnexal masses. Today bhcg 116 and transvaginal ultrasound still shows no IUP, FF, or adnexal masses. She remains asymptomatic with exception of mild abdominal cramping and intermittent vaginal bleeding. PE unremarkable. PT clearly instructed to return to care in 48 hours for f/u in clinic and if she is unable to do so return to ED. Blood type, O+ Rhogam not indicated. Pt given precautions to return to ED prn.     Pt discussed with Dr. Malagon.     Fallon Tao MD  OBGYN PGY2

## 2020-10-18 NOTE — ED PROVIDER NOTE - CLINICAL SUMMARY MEDICAL DECISION MAKING FREE TEXT BOX
"PT IRP Treatment    Primary Rehabilitation Diagnosis: CVA  Expected Discharge Date: 01/17/19  Planned Discharge Destination: Home    SUBJECTIVE: Subjective: Pt agreeable to PT "" I want to get my laundry if thats ok? \"" (01/15/19 1251)  Subjective/Objective Comments: chart reviewed. Pt sitting in recliner at end of session with call light in reach and alarm on (01/15/19 1251)    OBJECTIVE:  Precautions  Seizure Precautions: Yes (01/13/19 0830)  Other Precautions: Right visual field cut  (01/12/19 0830)    See below for current functional status overview. See PT flowsheet for full details regarding the PT therapy provided. ASSESSMENT:   Treatment today focused on gait training, transfers and standing balance tasks. Patient is demonstrating good progress supported by increased ambulation tolerance and dual tasking. Patient limited at this time by balance deficits. Patient will benefit from further skilled PT  for continued training with functional mobility to help the patient meet goal of safe d/c. This patient participated in all scheduled physical therapy time with this therapist today. EDUCATION:   On this date, education was provided to patient regarding  bed mobility, transfers, ambulation and fall prevention  The response to education was/were: Needs reinforcement    PLAN:   Continue skilled PT, including the following Treatment/Interventions: Functional transfer training;Strengthening; Endurance training;Patient/Family training;Cognitive reorientation; Bed mobility;Gait training; Compensatory technique education;Stairs retraining; Safety Education; Neuromuscular re-education (01/12/19 0700)   PT Frequency: 7 days/week (01/15/19 1251), Frequency Comments: 90 min/day atleast 5 days/week (01/15/19 1251)    Treatment Plan for Next Session: progress stairs with reciprocal pattern and focus on foot placement, visual scanning tasks, ambulation with inclusion of cognitive tasks/multi-tasking/visual " scanning, RLE neuro re-ed and coordination  Additional Plan Considerations:         RECOMMENDATIONS FOR DISCHARGE:  Recommendation for Discharge: PT: Home, OP therapy    PT/OT Mobility Equipment for Discharge: no needs anticipated (01/15/19 1251)  PT/OT ADL Equipment for Discharge: owns none, continue to assess  (01/13/19 0830)      FUNCTIONAL DATA OVERVIEW LAST 24 HOURS  Bed Mobility        Transfers  Transfers  Sit to Stand: Supervision [place]) (01/15/19 1251)  Stand to Sit: Supervision [address]) (01/15/19 1251)  Stand Pivot Transfers: Supervision [address]) (01/15/19 1251)  Assistive Device/: Gait Belt (01/15/19 1251)  Transfer Comments 1: supv for safety with no device (01/15/19 1251)    Gait  Gait  Gait Assistance: Supervision [place]) (01/15/19 1251)  Assistive Device/: Gait Belt (01/15/19 1251)  Ambulation Distance (Feet): 400 Feet (01/15/19 1251)  Pattern: Within functional limits (01/15/19 1251)  Ambulation Surface: Tile (01/15/19 1251)  Gait Comments 1: supv for safety 400ft  with cues for head turns and navigation of environment and 400ft carrying bag of clothes  mild instability noted pt able to self correct (01/15/19 1251),      Stairs       Wheelchair Mobility       Balance  Balance  Single Leg Stance R Leg: Minimal Assist (Min) (01/15/19 1251)  Single Leg Stance L Leg: Minimal Assist (Min) (01/15/19 1251)  Balance Comments #1: min assist for safety  initially SLS on right and left  was 1 sec able to continue trials  able to get up to 5 sec on left  and 10 on right (01/15/19 1251)    Neuromuscular Re-education  Neuromuscular Re-education  Neuromuscular Re-education 2: tapping top of cones with RLE and LLE  while performing cognitive tasks  naming A-Z animals, pt with decreased sequencing and noted increase in frustration toward end of task.  min assist at times for LOB (01/15/19 0901) 25F c/o vag bleeding recurrent since yest. and low abd pain, early pregnant, repeat HCG, CBC and U/S, eval for pregnancy of unknown location.

## 2020-10-18 NOTE — ED PROVIDER NOTE - NSFOLLOWUPINSTRUCTIONS_ED_ALL_ED_FT
Your bloodwork and ultrasound in the ED still did not show a location for the pregnancy.  You will need follow-up bloodwork in 48 hrs.  Folllow up in GYN clinic or bring paperwork back to ED in 48 hrs. (Tues.).  Return sooner to Emergency for any worsening pain, bleeding, weakness, fainting, vomiting, fever, or any signs of distress.    Neff análisis de rosalie y la ecografía en el servicio de urgencias aún no mostraron la ubicación del embarazo.  Necesitará análisis de rosalie de seguimiento en 48 horas.  Siga en la clínica GYN o traiga el papeleo a la lalito de emergencias en 48 horas. (Britt).  Regrese antes a Emergencias si empeora el dolor, sangrado, debilidad, desmayos, vómitos, fiebre o cualquier signo de angustia.

## 2020-10-18 NOTE — ED PROVIDER NOTE - MUSCULOSKELETAL NEGATIVE STATEMENT, MLM
[Menstruating] : menstruating
no back pain, no gout, no musculoskeletal pain, no neck pain, and no weakness.

## 2020-10-19 NOTE — CHART NOTE - NSCHARTNOTEFT_GEN_A_CORE
R2 TELEPHONE CHART NOTE     LMP  p/w brown vaginal spotting 10/14, found to have bHCG 72 and TVUS showing EM 8mm, PUL.   Repeat bHCG 10/18 was 116 and repeat TVUS without IUP, FF, adnexal masses.  Called pt to remind her to f/u in ED for repeat bHCG and TVUS.   Voicemail left.    Jenny Liu R2

## 2020-10-20 ENCOUNTER — EMERGENCY (EMERGENCY)
Facility: HOSPITAL | Age: 25
LOS: 1 days | Discharge: HOME | End: 2020-10-20
Attending: EMERGENCY MEDICINE | Admitting: EMERGENCY MEDICINE
Payer: MEDICAID

## 2020-10-20 VITALS
RESPIRATION RATE: 16 BRPM | TEMPERATURE: 98 F | OXYGEN SATURATION: 100 % | HEIGHT: 63 IN | HEART RATE: 57 BPM | SYSTOLIC BLOOD PRESSURE: 108 MMHG | DIASTOLIC BLOOD PRESSURE: 62 MMHG

## 2020-10-20 VITALS
DIASTOLIC BLOOD PRESSURE: 61 MMHG | TEMPERATURE: 99 F | HEART RATE: 82 BPM | SYSTOLIC BLOOD PRESSURE: 120 MMHG | RESPIRATION RATE: 16 BRPM | OXYGEN SATURATION: 100 %

## 2020-10-20 LAB
ALBUMIN SERPL ELPH-MCNC: 4.8 G/DL — SIGNIFICANT CHANGE UP (ref 3.3–5)
ALP SERPL-CCNC: 91 U/L — SIGNIFICANT CHANGE UP (ref 40–120)
ALT FLD-CCNC: 18 U/L — SIGNIFICANT CHANGE UP (ref 4–33)
ANION GAP SERPL CALC-SCNC: 11 MMO/L — SIGNIFICANT CHANGE UP (ref 7–14)
APPEARANCE UR: CLEAR — SIGNIFICANT CHANGE UP
AST SERPL-CCNC: 20 U/L — SIGNIFICANT CHANGE UP (ref 4–32)
BACTERIA # UR AUTO: NEGATIVE — SIGNIFICANT CHANGE UP
BASOPHILS # BLD AUTO: 0.04 K/UL — SIGNIFICANT CHANGE UP (ref 0–0.2)
BASOPHILS NFR BLD AUTO: 0.4 % — SIGNIFICANT CHANGE UP (ref 0–2)
BILIRUB SERPL-MCNC: 0.4 MG/DL — SIGNIFICANT CHANGE UP (ref 0.2–1.2)
BILIRUB UR-MCNC: NEGATIVE — SIGNIFICANT CHANGE UP
BLOOD UR QL VISUAL: HIGH
BUN SERPL-MCNC: 11 MG/DL — SIGNIFICANT CHANGE UP (ref 7–23)
CALCIUM SERPL-MCNC: 9.3 MG/DL — SIGNIFICANT CHANGE UP (ref 8.4–10.5)
CHLORIDE SERPL-SCNC: 101 MMOL/L — SIGNIFICANT CHANGE UP (ref 98–107)
CO2 SERPL-SCNC: 25 MMOL/L — SIGNIFICANT CHANGE UP (ref 22–31)
COLOR SPEC: SIGNIFICANT CHANGE UP
CREAT SERPL-MCNC: 0.79 MG/DL — SIGNIFICANT CHANGE UP (ref 0.5–1.3)
EOSINOPHIL # BLD AUTO: 0.03 K/UL — SIGNIFICANT CHANGE UP (ref 0–0.5)
EOSINOPHIL NFR BLD AUTO: 0.3 % — SIGNIFICANT CHANGE UP (ref 0–6)
GLUCOSE SERPL-MCNC: 100 MG/DL — HIGH (ref 70–99)
GLUCOSE UR-MCNC: NEGATIVE — SIGNIFICANT CHANGE UP
HCG SERPL-ACNC: 127.8 MIU/ML — SIGNIFICANT CHANGE UP
HCT VFR BLD CALC: 39.3 % — SIGNIFICANT CHANGE UP (ref 34.5–45)
HGB BLD-MCNC: 13.4 G/DL — SIGNIFICANT CHANGE UP (ref 11.5–15.5)
HYALINE CASTS # UR AUTO: NEGATIVE — SIGNIFICANT CHANGE UP
IMM GRANULOCYTES NFR BLD AUTO: 0.1 % — SIGNIFICANT CHANGE UP (ref 0–1.5)
KETONES UR-MCNC: NEGATIVE — SIGNIFICANT CHANGE UP
LEUKOCYTE ESTERASE UR-ACNC: NEGATIVE — SIGNIFICANT CHANGE UP
LYMPHOCYTES # BLD AUTO: 2.86 K/UL — SIGNIFICANT CHANGE UP (ref 1–3.3)
LYMPHOCYTES # BLD AUTO: 28.4 % — SIGNIFICANT CHANGE UP (ref 13–44)
MCHC RBC-ENTMCNC: 31.2 PG — SIGNIFICANT CHANGE UP (ref 27–34)
MCHC RBC-ENTMCNC: 34.1 % — SIGNIFICANT CHANGE UP (ref 32–36)
MCV RBC AUTO: 91.4 FL — SIGNIFICANT CHANGE UP (ref 80–100)
MONOCYTES # BLD AUTO: 0.76 K/UL — SIGNIFICANT CHANGE UP (ref 0–0.9)
MONOCYTES NFR BLD AUTO: 7.6 % — SIGNIFICANT CHANGE UP (ref 2–14)
NEUTROPHILS # BLD AUTO: 6.36 K/UL — SIGNIFICANT CHANGE UP (ref 1.8–7.4)
NEUTROPHILS NFR BLD AUTO: 63.2 % — SIGNIFICANT CHANGE UP (ref 43–77)
NITRITE UR-MCNC: NEGATIVE — SIGNIFICANT CHANGE UP
NRBC # FLD: 0 K/UL — SIGNIFICANT CHANGE UP (ref 0–0)
PH UR: 8 — SIGNIFICANT CHANGE UP (ref 5–8)
PLATELET # BLD AUTO: 263 K/UL — SIGNIFICANT CHANGE UP (ref 150–400)
PMV BLD: 10.9 FL — SIGNIFICANT CHANGE UP (ref 7–13)
POTASSIUM SERPL-MCNC: 3.8 MMOL/L — SIGNIFICANT CHANGE UP (ref 3.5–5.3)
POTASSIUM SERPL-SCNC: 3.8 MMOL/L — SIGNIFICANT CHANGE UP (ref 3.5–5.3)
PROT SERPL-MCNC: 7.6 G/DL — SIGNIFICANT CHANGE UP (ref 6–8.3)
PROT UR-MCNC: 10 — SIGNIFICANT CHANGE UP
RBC # BLD: 4.3 M/UL — SIGNIFICANT CHANGE UP (ref 3.8–5.2)
RBC # FLD: 11.2 % — SIGNIFICANT CHANGE UP (ref 10.3–14.5)
RBC CASTS # UR COMP ASSIST: >50 — HIGH (ref 0–?)
SODIUM SERPL-SCNC: 137 MMOL/L — SIGNIFICANT CHANGE UP (ref 135–145)
SP GR SPEC: 1.02 — SIGNIFICANT CHANGE UP (ref 1–1.04)
SQUAMOUS # UR AUTO: SIGNIFICANT CHANGE UP
UROBILINOGEN FLD QL: NORMAL — SIGNIFICANT CHANGE UP
WBC # BLD: 10.06 K/UL — SIGNIFICANT CHANGE UP (ref 3.8–10.5)
WBC # FLD AUTO: 10.06 K/UL — SIGNIFICANT CHANGE UP (ref 3.8–10.5)
WBC UR QL: SIGNIFICANT CHANGE UP (ref 0–?)

## 2020-10-20 PROCEDURE — 76830 TRANSVAGINAL US NON-OB: CPT | Mod: 26

## 2020-10-20 PROCEDURE — 99285 EMERGENCY DEPT VISIT HI MDM: CPT

## 2020-10-20 RX ORDER — ACETAMINOPHEN 500 MG
650 TABLET ORAL ONCE
Refills: 0 | Status: COMPLETED | OUTPATIENT
Start: 2020-10-20 | End: 2020-10-20

## 2020-10-20 RX ADMIN — Medication 650 MILLIGRAM(S): at 21:07

## 2020-10-20 NOTE — ED ADULT NURSE NOTE - OBJECTIVE STATEMENT
patient  Alert & ox3, 3 weeks pregnant c/o abd pain , back pain and vag bleed since Sunday.pt . evaluated by MD. Placed 20g angiocath Lt. AC., labs drawn and sent. Patient is comfortable ,patient will be waiting for results, further evaluation and disposition.  made comfortable.will continue to monitor.

## 2020-10-20 NOTE — CONSULT NOTE ADULT - ATTENDING COMMENTS
Pt seen and examined, agree with above resident note.  Pt with pregnancy of unknown location inappropriate rise of BHCG since 48 hrs ago.  USN reviewed no IUP or ectopic visualized.  Pt denies abdominal pain on exam, no pain to palpation, no rebound or guarding.  VSS.  Discussed with pt continued risk for ectopic pregnancy or miscarriage.  If develops pain or heavy bleeding pt to return to hospital.   Otherwise pt to return to hospital or clinic in 48 hrs for repeat bhcg and usn.      Payal Liang MD

## 2020-10-20 NOTE — ED PROVIDER NOTE - CLINICAL SUMMARY MEDICAL DECISION MAKING FREE TEXT BOX
24 y/o F, no PMH, , 3 weeks pregnant via LMP , presents to ED c/o abd pain and vaginal bleeding. Here Sharath MENDOSA: pelvic pain in pregnancy - concern for ectopic. Plan for labs, u/a, TVUS, GYN evaluation.   Update: TVUS with no IUP, seen by GYN who recommended patient return in 48 hours for repeat eval vs follow up in clinic.

## 2020-10-20 NOTE — CONSULT NOTE ADULT - SUBJECTIVE AND OBJECTIVE BOX
(pt refused hospital provided  and called , on her cell phone to interpret in Romanian)     HPI:  PETROS LAWSON is a 25y old  LMP  who presents for f/u up in setting of PUL. Pt was seen in ED on 10/14 for vaginal spotting, bhcg was 71, and transvaginal ultrasound that day showed no IUP, FF, or adnexal masses. On 10/18 repeat TVUS still showed no IUP and bhcg was 116. Today she denies any SOB/CP/n/v/dizziness and continues to endorse mild intermittent lower abdominal pain and some vaginal spotting. She was unable to make an appointment in clinic so she returned to the ED. Today bhcg is 127.      REVIEW OF SYSTEMS:  CONSTITUTIONAL: No weakness, fevers or chills  EYES/ENT: No visual changes  NECK: No pain or stiffness  RESPIRATORY: No cough, wheezing; No shortness of breath  CARDIOVASCULAR: No chest pain or palpitations  GASTROINTESTINAL: No nausea, vomiting; No diarrhea or constipation  GENITOURINARY: No dysuria, frequency or hematuria  NEUROLOGICAL: No headache, LOC  All other review of systems is negative unless indicated above        Allergies  No Known Allergies/Intolerances        PAST MEDICAL & SURGICAL HISTORY:  Missed   Migraines  No significant past surgical history        OB/GYN HISTORY:     see HPI                                            FAMILY HISTORY:  Father : heart disease, DM     FH: type 2 diabetes  father        SOCIAL HISTORY:  Denies x3    Vital Signs Last 24 Hrs  T(C): 37 (20 Oct 2020 20:59), Max: 37 (20 Oct 2020 20:59)  T(F): 98.6 (20 Oct 2020 20:59), Max: 98.6 (20 Oct 2020 20:59)  HR: 82 (20 Oct 2020 20:59) (57 - 82)  BP: 120/61 (20 Oct 2020 20:59) (108/62 - 120/61)  BP(mean): --  RR: 16 (20 Oct 2020 20:59) (16 - 16)  SpO2: 100% (20 Oct 2020 20:59) (100% - 100%)      PHYSICAL EXAM:  Constitutional: NAD, awake and alert, well-developed  HEENT: Normal Hearing,  Neck: Soft and supple  Respiratory: Breath sounds are clear bilaterally, No wheezing, rales or rhonchi  Cardiovascular: S1 and S2, regular rate and rhythm, no Murmurs, gallops or rubs  Gastrointestinal: soft, nontender, nondistended, no guarding, no rebound  Genitourinary: no active vaginal bleeding, bloody mucous in vagina, cervical os closed, no cervical motion tenderness, no tenderness with bimanual exam, no adnexal masses   Extremities: No peripheral edema  Vascular: 2+ peripheral pulses  Neurological: A/O x 3, no focal deficits  Skin: No rashes    LABS:                        13.4   10.06 )-----------( 263      ( 20 Oct 2020 18:50 )             39.3     10    137  |  101  |  11  ----------------------------<  100<H>  3.8   |  25  |  0.79    Ca    9.3      20 Oct 2020 18:50    TPro  7.6  /  Alb  4.8  /  TBili  0.4  /  DBili  x   /  AST  20  /  ALT  18  /  AlkPhos  91  10-20    I&O's Detail      Urinalysis Basic - ( 20 Oct 2020 19:00 )    Color: LIGHT YELLOW / Appearance: CLEAR / S.018 / pH: 8.0  Gluc: NEGATIVE / Ketone: NEGATIVE  / Bili: NEGATIVE / Urobili: NORMAL   Blood: LARGE / Protein: 10 / Nitrite: NEGATIVE   < from: US Transvaginal (10.20.20 @ 19:55) >  INTERPRETATION:  CLINICAL INFORMATION: Vaginal bleeding, pelvic pain. 3 weeks pregnant. Beta-hCG-127.8 from 116.4 on 10/18/2020.    LMP: 2020    COMPARISON: Pelvic ultrasound from 10/18/2020    TECHNIQUE:  Endovaginal pelvic sonogram only. Color and Spectral Doppler was performed.    FINDINGS:    Uterus: 7.0 x 4.6 x 2.7 cm. Within normal limits.  Endometrium: 5 mm. Within normal limits. There is no intrauterine gestational sac, yolk sac, fetal pole.    Right ovary: 2.7 x 2.5 x 1.9 cm. Within normal limits. Normal arterial waveforms.  Left ovary: 4.0 x 2.4 x 2.5 cm. 1.5 x 1.6 x 1.2 cm hemorrhagic corpus luteum cyst. Normal arterial waveforms.    Fluid: None.    IMPRESSION:    Pregnancy of unknown location. Follow-up with hCG and a dedicated pelvic ultrasound are recommended.    < end of copied text >  Leuk Esterase: NEGATIVE / RBC: >50 / WBC 0-2   Sq Epi: FEW / Non Sq Epi: x / Bacteria: NEGATIVE        RADIOLOGY & ADDITIONAL STUDIES:

## 2020-10-20 NOTE — ED PROVIDER NOTE - PROGRESS NOTE DETAILS
STEFANIE Byers: Spoke with OBGYN, pt cleared for discharge for return in 48 hours to clinic or to ED. Return precautions given STEFANIE Byers: Spoke with OBGYN, pt cleared for discharge for return in 48 hours to clinic or to ED. Return precautions given   [employer]  used for translation; Ling [id number]

## 2020-10-20 NOTE — CONSULT NOTE ADULT - ASSESSMENT
PETROS LAWSON is a 25y old  LMP  w/ PUL. Bhcg trend as follows (10/14) 71 > (10/18) 116 > (10/20) 127 & transvaginal ultrasound continues to show no IUP, FF, or adnexal masses. She remains asymptomatic with exception of mild abdominal cramping and intermittent vaginal bleeding. PE unremarkable. PT clearly instructed to return to care in 48 hours for f/u in clinic and if she is unable to do so return to ED. Blood type, O+ Rhogam not indicated. Pt given precautions to return to ED prn.     Pt discussed with Dr. Garth Tao MD  OBGYN PGY2

## 2020-10-20 NOTE — CHART NOTE - NSCHARTNOTEFT_GEN_A_CORE
From: Marina Strong  To: Sarah Duran MD  Sent: 4/19/2018 8:06 PM CDT  Subject: Yeast infection    On April 8th I went to [facility] on [address] for a UTI. My results came back that I did have one. I had ask for them to give me a diflucan pill and they stated that they wouldn't give me one that I would have to come back. I miss my dr appointment on Friday due to my work schedule. I was wondering if you could look in the database to see that I was seen and prescribe me the pill to the [facility].   R2 TELEPHONE CHART NOTE    26yo  LMP  p/w brown vaginal spotting 10/14, found to have bHCG 72 and TVUS showing EM 8mm, PUL.   Repeat bHCG 10/18 was 116 and repeat TVUS without IUP, FF, adnexal masses.  Called pt to remind her to f/u in ED for repeat bHCG and TVUS today.  Message left on pt phone number on chart.    Called pt  and he was able to connect to wife; wife agreeable to conference call discussion with . Pt has a different phone #.  Pt confirms that she will be presenting to ED today for repeat bHCG and TVUS.   Continues to have brown vaginal discharge. Denies abdominal or pelvic pain/cramping.  Instructed pt to present to [facility] ED for evaluation; pt agreeable.     Jenny Liu R2

## 2020-10-20 NOTE — ED PROVIDER NOTE - OBJECTIVE STATEMENT
26 y/o F, no PMH, , 3 weeks pregnant via LMP , presents to ED c/o intermittent abd pain, low back pain, dysuria and vaginal bleeding (changing ~2pads/hr) x 1 week. On 10/14, pt presents to ED for her symptoms, during that visit she had a bHCG of 72 and a TVUS concerning for PUL w/o FF or adnexal masses. Pt was told to represent in 48 hrs for a rpt. Pt next presented on 10/18, and at that time her bHCG was 116 and TVUS still concerning did not show IUP. Pt is now presenting for the third time today for rpt bHCG and TVUS. Denies fevers, chills, n/v, CP, SOB, or any other symptoms at this time.

## 2020-10-20 NOTE — ED PROVIDER NOTE - NS ED ROS FT
Gen: Denies fever, weight loss  HEENT: Denies vision changes, ear pain, epistaxis, sore throat  CV: Denies chest pain, palpitations  Skin: Denies rash, erythema, color changes  Resp: Denies SOB, cough  Endo: Denies sensitivity to heat, cold, increased urination  GI: Denies constipation, nausea, vomiting, diarrhea; +abdominal pain  Msk: Denies LE swelling, extremity pain; +back pain  : Denies increased frequency; +dysuria, +vaginal bleeding  Neuro: Denies LOC, weakness, numbness, tingling  Psych: Denies hx of psych, hallucinations  ROS statement: all other ROS negative except as per HPI

## 2020-10-20 NOTE — ED PROVIDER NOTE - ATTENDING CONTRIBUTION TO CARE
Patient is a 24 yo F French speaking, , LMP 20, with no chronic medical problems here for evaluation of pelvic discomfort in setting of pregnancy. Patient is a 24 yo F Estonian speaking, , LMP 20, with no chronic medical problems here for evaluation of pelvic discomfort in setting of pregnancy and no confirmed IUP. Patient speaks Estonian and English. I used a , [employer]  [id number] to speak to her. Accordingly, she was here on 10/14 and 10/18 for vaginal bleeding and pelvic discomfort. At those ED visits, she had a beta than went from 71 -> 116 and was told to come back to the ED for reassessment. She states she now has pain with urination. No fevers, chills, nausea, vomiting. No chest pain or shortness of breath. She has both pelvic pain and back pain on her right lower side. Patient states she is using about 2 pads a day.     VS noted  Gen. no acute distress, Non toxic   HEENT: EOMI, mmm  Lungs: CTAB/L no C/ W /R   CVS: RRR   Abd; Soft non tender, non distended, no CVA tenderness  : done with Dr. Cohen: normal external genitalia, scant blood around cervix, no active bleeding, closed os, right adnexal tenderness  Ext: no edema  Skin: no rash  Neuro AAOx3 non focal clear speech  a/p: pelvic pain in pregnancy - concern for ectopic. Plan for labs, u/a, TVUS, GYN evaluation.   - Sharath MENDOSA

## 2020-10-20 NOTE — ED PROVIDER NOTE - PATIENT PORTAL LINK FT
You can access the FollowMyHealth Patient Portal offered by [facility] by registering at the following website: [email or web address]. By joining [facility]’s FollowMyHealth portal, you will also be able to view your health information using other applications (apps) compatible with our system.

## 2020-10-20 NOTE — ED PROVIDER NOTE - NSFOLLOWUPINSTRUCTIONS_ED_ALL_ED_FT
RETURN in 48 hours, Thursday for repeat HCG and US. You can call clinic ([phone]) to try an make an appointment for Thursday if you can not make an appointment, return to the ED on Thursday    If any worsening pain, vaginal bleeding, vomiting, fevers, or any other new or concerning symptoms return to the ED.

## 2020-10-20 NOTE — ED PROVIDER NOTE - PHYSICAL EXAMINATION
PHYSICAL EXAM:  GENERAL: non-toxic appearing; in no respiratory distress  HEENT: Atraumatic, Normocephalic, PERRL, EOMs intact b/l w/out deficits, no conjunctival pallor, MMM  NECK: No JVD; FROM  CHEST/LUNG: CTAB no wheezes/rhonchi/rales  HEART: RRR no murmur/gallops/rubs  ABDOMEN: +BS, soft, ND, +LLQ/RLQ/Suprapubic TTP, no rebound/guarding; + R CVAT.  : Speculum exam - minimal blood in vaginal vault, closed cervical os; Bimanual exam - no CMT, +R adnexal tenderness, no adnexal fullness. Chaperoned by Batsheva Early MD.  EXTREMITIES: No LE edema, +2 radial pulses b/l, +2 DP/PT pulses b/l  MUSCULOSKELETAL: FROM of all 4 extremities  NERVOUS SYSTEM:  A&Ox3, No motor deficits or sensory deficits; no focal neurologic deficits  SKIN:  No new rashes

## 2020-10-22 ENCOUNTER — EMERGENCY (EMERGENCY)
Facility: HOSPITAL | Age: 25
LOS: 1 days | Discharge: HOME | End: 2020-10-22
Attending: EMERGENCY MEDICINE | Admitting: EMERGENCY MEDICINE
Payer: MEDICAID

## 2020-10-22 VITALS
TEMPERATURE: 98 F | RESPIRATION RATE: 17 BRPM | HEART RATE: 80 BPM | DIASTOLIC BLOOD PRESSURE: 70 MMHG | SYSTOLIC BLOOD PRESSURE: 106 MMHG | OXYGEN SATURATION: 100 %

## 2020-10-22 VITALS
OXYGEN SATURATION: 100 % | RESPIRATION RATE: 17 BRPM | TEMPERATURE: 98 F | SYSTOLIC BLOOD PRESSURE: 117 MMHG | HEIGHT: 63 IN | DIASTOLIC BLOOD PRESSURE: 67 MMHG | HEART RATE: 82 BPM

## 2020-10-22 LAB
ANION GAP SERPL CALC-SCNC: 11 MMO/L — SIGNIFICANT CHANGE UP (ref 7–14)
APPEARANCE UR: CLEAR — SIGNIFICANT CHANGE UP
APTT BLD: 32.4 SEC — SIGNIFICANT CHANGE UP (ref 27–36.3)
BACTERIA # UR AUTO: SIGNIFICANT CHANGE UP
BASOPHILS # BLD AUTO: 0.02 K/UL — SIGNIFICANT CHANGE UP (ref 0–0.2)
BASOPHILS NFR BLD AUTO: 0.2 % — SIGNIFICANT CHANGE UP (ref 0–2)
BILIRUB UR-MCNC: NEGATIVE — SIGNIFICANT CHANGE UP
BLD GP AB SCN SERPL QL: NEGATIVE — SIGNIFICANT CHANGE UP
BLOOD UR QL VISUAL: HIGH
BUN SERPL-MCNC: 10 MG/DL — SIGNIFICANT CHANGE UP (ref 7–23)
CALCIUM SERPL-MCNC: 9.2 MG/DL — SIGNIFICANT CHANGE UP (ref 8.4–10.5)
CHLORIDE SERPL-SCNC: 102 MMOL/L — SIGNIFICANT CHANGE UP (ref 98–107)
CO2 SERPL-SCNC: 23 MMOL/L — SIGNIFICANT CHANGE UP (ref 22–31)
COLOR SPEC: SIGNIFICANT CHANGE UP
CREAT SERPL-MCNC: 0.54 MG/DL — SIGNIFICANT CHANGE UP (ref 0.5–1.3)
EOSINOPHIL # BLD AUTO: 0.06 K/UL — SIGNIFICANT CHANGE UP (ref 0–0.5)
EOSINOPHIL NFR BLD AUTO: 0.6 % — SIGNIFICANT CHANGE UP (ref 0–6)
GLUCOSE SERPL-MCNC: 101 MG/DL — HIGH (ref 70–99)
GLUCOSE UR-MCNC: NEGATIVE — SIGNIFICANT CHANGE UP
HCG SERPL-ACNC: 188 MIU/ML — SIGNIFICANT CHANGE UP
HCT VFR BLD CALC: 41.8 % — SIGNIFICANT CHANGE UP (ref 34.5–45)
HGB BLD-MCNC: 13.9 G/DL — SIGNIFICANT CHANGE UP (ref 11.5–15.5)
HYALINE CASTS # UR AUTO: NEGATIVE — SIGNIFICANT CHANGE UP
IMM GRANULOCYTES NFR BLD AUTO: 0.2 % — SIGNIFICANT CHANGE UP (ref 0–1.5)
INR BLD: 0.98 — SIGNIFICANT CHANGE UP (ref 0.88–1.16)
KETONES UR-MCNC: NEGATIVE — SIGNIFICANT CHANGE UP
LEUKOCYTE ESTERASE UR-ACNC: NEGATIVE — SIGNIFICANT CHANGE UP
LYMPHOCYTES # BLD AUTO: 2.46 K/UL — SIGNIFICANT CHANGE UP (ref 1–3.3)
LYMPHOCYTES # BLD AUTO: 26.6 % — SIGNIFICANT CHANGE UP (ref 13–44)
MCHC RBC-ENTMCNC: 31.2 PG — SIGNIFICANT CHANGE UP (ref 27–34)
MCHC RBC-ENTMCNC: 33.3 % — SIGNIFICANT CHANGE UP (ref 32–36)
MCV RBC AUTO: 93.7 FL — SIGNIFICANT CHANGE UP (ref 80–100)
MONOCYTES # BLD AUTO: 0.63 K/UL — SIGNIFICANT CHANGE UP (ref 0–0.9)
MONOCYTES NFR BLD AUTO: 6.8 % — SIGNIFICANT CHANGE UP (ref 2–14)
NEUTROPHILS # BLD AUTO: 6.05 K/UL — SIGNIFICANT CHANGE UP (ref 1.8–7.4)
NEUTROPHILS NFR BLD AUTO: 65.6 % — SIGNIFICANT CHANGE UP (ref 43–77)
NITRITE UR-MCNC: NEGATIVE — SIGNIFICANT CHANGE UP
NRBC # FLD: 0 K/UL — SIGNIFICANT CHANGE UP (ref 0–0)
PH UR: 7.5 — SIGNIFICANT CHANGE UP (ref 5–8)
PLATELET # BLD AUTO: 247 K/UL — SIGNIFICANT CHANGE UP (ref 150–400)
PMV BLD: 11.3 FL — SIGNIFICANT CHANGE UP (ref 7–13)
POTASSIUM SERPL-MCNC: 3.9 MMOL/L — SIGNIFICANT CHANGE UP (ref 3.5–5.3)
POTASSIUM SERPL-SCNC: 3.9 MMOL/L — SIGNIFICANT CHANGE UP (ref 3.5–5.3)
PROT UR-MCNC: 20 — SIGNIFICANT CHANGE UP
PROTHROM AB SERPL-ACNC: 11.2 SEC — SIGNIFICANT CHANGE UP (ref 10.6–13.6)
RBC # BLD: 4.46 M/UL — SIGNIFICANT CHANGE UP (ref 3.8–5.2)
RBC # FLD: 11.2 % — SIGNIFICANT CHANGE UP (ref 10.3–14.5)
RBC CASTS # UR COMP ASSIST: HIGH (ref 0–?)
RH IG SCN BLD-IMP: POSITIVE — SIGNIFICANT CHANGE UP
SODIUM SERPL-SCNC: 136 MMOL/L — SIGNIFICANT CHANGE UP (ref 135–145)
SP GR SPEC: 1.02 — SIGNIFICANT CHANGE UP (ref 1–1.04)
SQUAMOUS # UR AUTO: SIGNIFICANT CHANGE UP
UROBILINOGEN FLD QL: NORMAL — SIGNIFICANT CHANGE UP
WBC # BLD: 9.24 K/UL — SIGNIFICANT CHANGE UP (ref 3.8–10.5)
WBC # FLD AUTO: 9.24 K/UL — SIGNIFICANT CHANGE UP (ref 3.8–10.5)
WBC UR QL: SIGNIFICANT CHANGE UP (ref 0–?)
YEAST FLD HPF-#/AREA: SIGNIFICANT CHANGE UP

## 2020-10-22 PROCEDURE — 99284 EMERGENCY DEPT VISIT MOD MDM: CPT

## 2020-10-22 PROCEDURE — 76817 TRANSVAGINAL US OBSTETRIC: CPT | Mod: 26

## 2020-10-22 NOTE — ED PROVIDER NOTE - OBJECTIVE STATEMENT
24 yo  LMP  presents for repeat hcg testing and ultrasound.  Patient with pregnancy of unknown location, last hcg 127 10/22/20.  Patient reports resolution of abdominal pain.  Now having vaginal spotting, but no passage of products or clots.  Denies dizziness, lightheadedness, chest pain, palpitation shortness of breath. Rh positive on last type and screen.

## 2020-10-22 NOTE — ED PROVIDER NOTE - PROGRESS NOTE DETAILS
OB/GYN consulted and to see patient bessy: pt pending mtx at time of sign out. bessy:  pt stable for d/c. with ob f/u.

## 2020-10-22 NOTE — ED PROVIDER NOTE - NSFOLLOWUPINSTRUCTIONS_ED_ALL_ED_FT
Please follow up on October 26th with your OBGYN specialist.     If you develop heavy vaginal bleeding (soaking through 1-2 pads within 2 hours), new or worsening abdominal pain, nausea/vomiting, pain with urination/burning, bloody urine or stools, fevers or chills, return to the ER immediately.

## 2020-10-22 NOTE — ED PROVIDER NOTE - CLINICAL SUMMARY MEDICAL DECISION MAKING FREE TEXT BOX
24 yo  LMP  presents for repeat hcg testing and ultrasound for pregnancy of unk location.  Reports vaginal spotting.  HD stable.  Abdomen non-tender.  Spoke with gyn who is to see patient.  Ectopic considered but patient stable.  Labs, ua, tvus.  Dispo pending.

## 2020-10-22 NOTE — ED PROVIDER NOTE - PHYSICAL EXAMINATION
GEN:  Non-toxic appearing, non-distressed, speaking full sentences, non-diaphoretic, AAOx3  HEENT:  NCAT, neck supple, EOMI, PERRLA, sclera anicteric, no conjunctival pallor or injection, no stridor, normal voice, no tonsillar exudate, uvula midline, tympanic membranes and external auditory canals normal appearing bilaterally   CV:  regular rhythm and rate, s1/s2 audible, no murmurs, rubs or gallops, peripheral pulses 2+ and symmetric  PULM:  non-labored respirations, lungs clear to auscultation bilaterally, no wheezes, crackles or rales  ABD:  non distended, non-tender, no rebound, no guarding, negative Miguel's sign, bowel sounds normal, no cvat  MSK:  no gross deformity, non-tender extremities and joints, range of motion grossly normal appearing, no extremity edema, extremities warm and well perfused   NEURO:  AAOx3, CN II-XII intact, motor 5/5 in upper and lower extremities bilaterally, sensation grossly intact in extremities and trunk, finger to nose testing wnl, no nystagmus, negative Romberg, no pronator drift, no gait deficit  SKIN:  warm, dry, no rash or vesicles

## 2020-10-22 NOTE — CONSULT NOTE ADULT - ASSESSMENT
PETROS LAWSON is a 25y old  LMP  w/ PUL. Bhcg trend as follows (10/14) 71 > (10/18) 116 > (10/20) 127 > Today 188 & transvaginal ultrasound shows ***. She remains asymptomatic with exception of mild abdominal cramping and intermittent vaginal bleeding. PE unremarkable. Pt will receive methotrexate and will need follow up in 4 days  10/26 and 7 days 10/29. Pt O+ Rhogam not indicated. Pt given precautions to return to ED prn.     Pt discussed with Dr. Garth Tao MD  OBGYN PGY2 PETROS LAWSON is a 25y old  LMP  w/ PUL. Bhcg trend as follows (10/14) 71 > (10/18) 116 > (10/20) 127 > Today 188 & transvaginal ultrasound still shows no IUP, no adnexal masses aside from corpus luteum, and no free fluid. She remains asymptomatic with exception of minimal vaginal bleeding. PE unremarkable. Pt will receive methotrexate today and will need follow up in 4 days 10/26 and 7 days 10/29. Pt O+ Rhogam not indicated. Pt given precautions to return to ED prn.     Pt seen discussed with Dr. Garth Tao MD  OBGYN PGY2

## 2020-10-22 NOTE — CHART NOTE - NSCHARTNOTEFT_GEN_A_CORE
R2 TELEPHONE CHART NOTE     LMP  p/w brown vaginal spotting 10/14, found to have bHCG 72 and TVUS showing EM 8mm, PUL.   Repeat bHCG 10/18 was 116 and repeat TVUS without IUP, FF, adnexal masses.  bHCG 10/20: 127, TVUS: No intrauterine pregnancy, no FF, no adnexal masses  This is a desired pregnancy.    Called pt to remind of plan. Will present to ED ~3:30pm.   Plan for bHCG +/- TVUS.     Jenny Liu R2

## 2020-10-22 NOTE — CONSULT NOTE ADULT - ATTENDING COMMENTS
Agree with above resident note.  Pt with Pregnancy of unknown location, [facility] 188 today from 128 on 10/20.  Pt denied any pain. On exam no tenderness to palpation, no rebound/ no guarding.   D/w pt abnormal pregnancy, also reviewed ultrasound findings.  Discussed expectant management vs medical management vs surgical management.  Pt opts for MTX for treatment of abnormal pregnancy of unknown location- high suspicion for ectopic pregnancy.  Risks and benefits of MTX discussed.  Discussed importance of f/u on days 4 and 7.  Discussed when to return to hospital if pt with worsening abdominal pain or heavy bleeding.  Also discussed that MTX may fail,  pt may need an additional dose of MTX, or emergency surgery may be needed in the event pt has a ruptured ectopic pregnancy.  All questions and concerns answered.  Pt's understanding confirmed with teach back method.  Order placed for MTX.  Pt to remain on beta list for f/u.        Payal Liang MD

## 2020-10-22 NOTE — ED ADULT NURSE NOTE - OBJECTIVE STATEMENT
26 y/o female arrives to intake rm 12 c/o "very light" vaginal bleeding x1 day, pt is 8 wks pregnant. A&Ox4, sitting up, appears comfortable, neg SOB, respirations equal & unlabored, denies chest pain, denies N/V/D, denies using more than 1 pad today, endorses pain to lower abdomen/pelvis. R 20g IV placed, blood work sent.

## 2020-10-22 NOTE — ED PROVIDER NOTE - SHIFT CHANGE DETAILS
OBED:  Patient signed out to Dr. Arriola pending ultrasound result and OB/GYN evaluation.  HD stable at time of signout.

## 2020-10-22 NOTE — ED PROVIDER NOTE - LANGUAGE ASSISTANCE NEEDED
pt able to make needs known in English/No-Patient/Caregiver offered and refused free interpretation services.

## 2020-10-22 NOTE — CONSULT NOTE ADULT - SUBJECTIVE AND OBJECTIVE BOX
******    (: *** )    HPI:  PETROS LAWSON is a 25y old  LMP  who presents for f/u up in setting of PUL, pt was seen in ED on 10/14 for vaginal spotting. Bhcg trend as follows: 71 (10/14) > 116 (10/18) > 127 (10/20) > today 188. Transvaginal ultrasound has repeatedly showed no IUP, FF, or adnexal masses. Today she *** denies any SOB/CP/n/v/dizziness and continues to endorse mild intermittent lower abdominal pain and some vaginal spotting.                                            REVIEW OF SYSTEMS:  CONSTITUTIONAL: No weakness, fevers or chills  EYES/ENT: No visual changes  NECK: No pain or stiffness  RESPIRATORY: No cough, wheezing; No shortness of breath  CARDIOVASCULAR: No chest pain or palpitations  GASTROINTESTINAL: No abdominal or epigastric pain. No nausea, vomiting; No diarrhea or constipation  GENITOURINARY: No dysuria, frequency or hematuria  NEUROLOGICAL: No headache, LOC  All other review of systems is negative unless indicated above      Allergies    No Known Allergies    Intolerances        PAST MEDICAL & SURGICAL HISTORY:  Missed   Migraines  No significant past surgical history      FAMILY HISTORY:  Father : heart disease, DM     FH: type 2 diabetes  father    SOCIAL HISTORY:  Denies x3    Vital Signs Last 24 Hrs  T(C): 36.7 (22 Oct 2020 17:32), Max: 36.7 (22 Oct 2020 17:32)  T(F): 98.1 (22 Oct 2020 17:32), Max: 98.1 (22 Oct 2020 17:32)  HR: 87 (22 Oct 2020 17:32) (82 - 87)  BP: 107/60 (22 Oct 2020 17:32) (107/60 - 117/67)  BP(mean): --  RR: 16 (22 Oct 2020 17:32) (16 - 17)  SpO2: 100% (22 Oct 2020 17:32) (100% - 100%)    PHYSICAL EXAM:  Constitutional: NAD, awake and alert, well-developed  HEENT: Normal Hearing,  Neck: Soft and supple  Respiratory: Breath sounds are clear bilaterally, No wheezing, rales or rhonchi  Cardiovascular: S1 and S2, regular rate and rhythm, no Murmurs, gallops or rubs  Gastrointestinal: ***soft, nontender, nondistended, no guarding, no rebound  Genitourinary: ****no active vaginal bleeding, bloody mucous in vagina, cervical os closed, no cervical motion tenderness, no tenderness with bimanual exam, no adnexal masses   Extremities: No peripheral edema  Neurological: A/O x 3, no focal deficits  Skin: No rashes    LABS:                        13.9   9.24  )-----------( 247      ( 22 Oct 2020 18:14 )             41.8     10-22    136  |  102  |  10  ----------------------------<  101<H>  3.9   |  23  |  0.54    Ca    9.2      22 Oct 2020 18:14      I&O's Detail    PT/INR - ( 22 Oct 2020 18:14 )   PT: 11.2 SEC;   INR: 0.98          PTT - ( 22 Oct 2020 18:14 )  PTT:32.4 SEC      RADIOLOGY & ADDITIONAL STUDIES:   (: Zhou, #[id number])    HPI:  PETROS LAWSON is a 25y old  LMP  who presents for f/u up in setting of PUL, pt was seen in ED on 10/14 for vaginal spotting. Bhcg trend as follows: 71 (10/14) > 116 (10/18) > 127 (10/20) > today 188. Transvaginal ultrasound has repeatedly showed no IUP, FF, or adnexal masses. Today she denies any SOB/CP/n/v/dizziness, denies abdominal pain and endorses some minimal spotting.                                            REVIEW OF SYSTEMS:  CONSTITUTIONAL: No weakness, fevers or chills  EYES/ENT: No visual changes  NECK: No pain or stiffness  RESPIRATORY: No shortness of breath  CARDIOVASCULAR: No chest pain or palpitations  GASTROINTESTINAL: No abdominal or epigastric pain. No nausea, vomiting; No diarrhea or constipation  GENITOURINARY: No dysuria, frequency or hematuria  NEUROLOGICAL: No headache, LOC  All other review of systems is negative unless indicated above      Allergies  No Known Allergies/Intolerances        PAST MEDICAL & SURGICAL HISTORY:  Missed   Migraines  No significant past surgical history      FAMILY HISTORY:  Father : heart disease, DM     FH: type 2 diabetes  father    SOCIAL HISTORY:  Denies x3    Vital Signs Last 24 Hrs  T(C): 36.7 (22 Oct 2020 17:32), Max: 36.7 (22 Oct 2020 17:32)  T(F): 98.1 (22 Oct 2020 17:32), Max: 98.1 (22 Oct 2020 17:32)  HR: 87 (22 Oct 2020 17:32) (82 - 87)  BP: 107/60 (22 Oct 2020 17:32) (107/60 - 117/67)  BP(mean): --  RR: 16 (22 Oct 2020 17:32) (16 - 17)  SpO2: 100% (22 Oct 2020 17:32) (100% - 100%)    PHYSICAL EXAM:  Constitutional: NAD, awake and alert, well-developed  HEENT: Normal Hearing,  Neck: Soft and supple  Respiratory: Breath sounds are clear bilaterally, No wheezing, rales or rhonchi  Cardiovascular: S1 and S2, regular rate and rhythm, no Murmurs, gallops or rubs  Gastrointestinal: soft, mildly tender to deep palpation in suprapubic region, nondistended, no guarding, no rebound  Genitourinary: no active vaginal bleeding, bloody mucous in vagina, cervical os closed, no cervical motion tenderness, no tenderness with bimanual exam, no adnexal masses   Extremities: No peripheral edema  Neurological: A/O x 3, no focal deficits  Skin: No rashes    LABS:                        13.9   9.24  )-----------( 247      ( 22 Oct 2020 18:14 )             41.8     10-22    136  |  102  |  10  ----------------------------<  101<H>  3.9   |  23  |  0.54    Ca    9.2      22 Oct 2020 18:14      I&O's Detail    PT/INR - ( 22 Oct 2020 18:14 )   PT: 11.2 SEC;   INR: 0.98          PTT - ( 22 Oct 2020 18:14 )  PTT:32.4 SEC      RADIOLOGY & ADDITIONAL STUDIES:  < from: US Transvaginal, OB (10.22.20 @ 19:47) >  INTERPRETATION:  CLINICAL INFORMATION: Vaginal bleeding. Beta-hCG is 188.    LMP: 2020    Estimated Gestational Age by LMP: 3 weeks, 3 days.    COMPARISON: Pelvic sonogram from 10/20/2020.    Endovaginal and transabdominal pelvic sonogram. Color and Spectral Doppler was performed.    FINDINGS:  Uterus: 6.2 cm in longitudinal dimension. No intrauterine pregnancy is identified. Endometrium measures 3 mm.    Right ovary: 3.8 x 1.8 x 2.7 cm. Within normal limits. Normal arterial and venous waveforms.  Left ovary: 4.4 x 2.1 x 4.3 cm. Corpus luteum. Left ovarian Doppler images are provided.    Fluid: None.    IMPRESSION:    Pregnancy of unknown location. Recommend continued monitoring with pelvic sonogram and beta hCG.    < end of copied text >

## 2020-10-22 NOTE — ED PROVIDER NOTE - PATIENT PORTAL LINK FT
You can access the FollowMyHealth Patient Portal offered by [facility] by registering at the following website: [email or web address]. By joining [facility]’s FollowMyHealth portal, you will also be able to view your health information using other applications (apps) compatible with our system.

## 2020-10-22 NOTE — ED ADULT NURSE NOTE - NSIMPLEMENTINTERV_GEN_ALL_ED
Implemented All Universal Safety Interventions:  [place] to call system. Call bell, personal items and telephone within reach. Instruct patient to call for assistance. Room bathroom lighting operational. Non-slip footwear when patient is off stretcher. Physically safe environment: no spills, clutter or unnecessary equipment. Stretcher in lowest position, wheels locked, appropriate side rails in place.

## 2020-10-23 LAB
CULTURE RESULTS: SIGNIFICANT CHANGE UP
SPECIMEN SOURCE: SIGNIFICANT CHANGE UP

## 2020-10-23 RX ORDER — METHOTREXATE 2.5 MG/1
82 TABLET ORAL ONCE
Refills: 0 | Status: COMPLETED | OUTPATIENT
Start: 2020-10-23 | End: 2020-10-23

## 2020-10-23 RX ADMIN — METHOTREXATE 82 MILLIGRAM(S): 2.5 TABLET ORAL at 01:33

## 2020-10-26 ENCOUNTER — LABORATORY RESULT (OUTPATIENT)
Age: 25
End: 2020-10-26

## 2020-10-26 ENCOUNTER — APPOINTMENT (OUTPATIENT)
Dept: OBGYN | Facility: HOSPITAL | Age: 25
End: 2020-10-26
Payer: MEDICAID

## 2020-10-26 ENCOUNTER — OUTPATIENT (OUTPATIENT)
Dept: OUTPATIENT SERVICES | Facility: HOSPITAL | Age: 25
LOS: 1 days | End: 2020-10-26

## 2020-10-26 VITALS
HEIGHT: 66 IN | BODY MASS INDEX: 23.43 KG/M2 | DIASTOLIC BLOOD PRESSURE: 63 MMHG | SYSTOLIC BLOOD PRESSURE: 112 MMHG | HEART RATE: 78 BPM | WEIGHT: 145.8 LBS | TEMPERATURE: 97.9 F

## 2020-10-26 LAB — HCG SERPL-ACNC: 193.6 MIU/ML — SIGNIFICANT CHANGE UP

## 2020-10-26 PROCEDURE — 99213 OFFICE O/P EST LOW 20 MIN: CPT | Mod: GE

## 2020-10-27 DIAGNOSIS — O00.90 UNSPECIFIED ECTOPIC PREGNANCY WITHOUT INTRAUTERINE PREGNANCY: ICD-10-CM

## 2020-10-27 DIAGNOSIS — O36.80X0 PREGNANCY WITH INCONCLUSIVE FETAL VIABILITY, NOT APPLICABLE OR UNSPECIFIED: ICD-10-CM

## 2020-10-29 ENCOUNTER — OUTPATIENT (OUTPATIENT)
Dept: OUTPATIENT SERVICES | Facility: HOSPITAL | Age: 25
LOS: 1 days | End: 2020-10-29

## 2020-10-29 ENCOUNTER — LABORATORY RESULT (OUTPATIENT)
Age: 25
End: 2020-10-29

## 2020-10-29 ENCOUNTER — EMERGENCY (EMERGENCY)
Facility: HOSPITAL | Age: 25
LOS: 1 days | Discharge: HOME | End: 2020-10-29
Attending: EMERGENCY MEDICINE | Admitting: EMERGENCY MEDICINE
Payer: MEDICAID

## 2020-10-29 ENCOUNTER — APPOINTMENT (OUTPATIENT)
Dept: OBGYN | Facility: HOSPITAL | Age: 25
End: 2020-10-29

## 2020-10-29 ENCOUNTER — APPOINTMENT (OUTPATIENT)
Dept: OBGYN | Facility: HOSPITAL | Age: 25
End: 2020-10-29
Payer: MEDICAID

## 2020-10-29 VITALS
BODY MASS INDEX: 23.3 KG/M2 | HEART RATE: 73 BPM | TEMPERATURE: 98.2 F | WEIGHT: 145 LBS | DIASTOLIC BLOOD PRESSURE: 56 MMHG | SYSTOLIC BLOOD PRESSURE: 120 MMHG | HEIGHT: 66 IN

## 2020-10-29 VITALS
TEMPERATURE: 98 F | RESPIRATION RATE: 16 BRPM | OXYGEN SATURATION: 100 % | SYSTOLIC BLOOD PRESSURE: 111 MMHG | HEIGHT: 63 IN | HEART RATE: 80 BPM | DIASTOLIC BLOOD PRESSURE: 59 MMHG

## 2020-10-29 DIAGNOSIS — O36.80X0 PREGNANCY WITH INCONCLUSIVE FETAL VIABILITY, NOT APPLICABLE OR UNSPECIFIED: ICD-10-CM

## 2020-10-29 LAB
ALBUMIN SERPL ELPH-MCNC: 5.1 G/DL — HIGH (ref 3.3–5)
ALP SERPL-CCNC: 96 U/L — SIGNIFICANT CHANGE UP (ref 40–120)
ALT FLD-CCNC: 14 U/L — SIGNIFICANT CHANGE UP (ref 4–33)
ANION GAP SERPL CALC-SCNC: 12 MMO/L — SIGNIFICANT CHANGE UP (ref 7–14)
AST SERPL-CCNC: 17 U/L — SIGNIFICANT CHANGE UP (ref 4–32)
BASOPHILS # BLD AUTO: 0.03 K/UL — SIGNIFICANT CHANGE UP (ref 0–0.2)
BASOPHILS NFR BLD AUTO: 0.4 % — SIGNIFICANT CHANGE UP (ref 0–2)
BILIRUB SERPL-MCNC: 0.5 MG/DL — SIGNIFICANT CHANGE UP (ref 0.2–1.2)
BUN SERPL-MCNC: 13 MG/DL — SIGNIFICANT CHANGE UP (ref 7–23)
CALCIUM SERPL-MCNC: 9.5 MG/DL — SIGNIFICANT CHANGE UP (ref 8.4–10.5)
CHLORIDE SERPL-SCNC: 103 MMOL/L — SIGNIFICANT CHANGE UP (ref 98–107)
CO2 SERPL-SCNC: 23 MMOL/L — SIGNIFICANT CHANGE UP (ref 22–31)
CREAT SERPL-MCNC: 0.58 MG/DL — SIGNIFICANT CHANGE UP (ref 0.5–1.3)
EOSINOPHIL # BLD AUTO: 0.06 K/UL — SIGNIFICANT CHANGE UP (ref 0–0.5)
EOSINOPHIL NFR BLD AUTO: 0.8 % — SIGNIFICANT CHANGE UP (ref 0–6)
GLUCOSE SERPL-MCNC: 101 MG/DL — HIGH (ref 70–99)
HCG SERPL-ACNC: 186.4 MIU/ML — SIGNIFICANT CHANGE UP
HCT VFR BLD CALC: 40.2 % — SIGNIFICANT CHANGE UP (ref 34.5–45)
HGB BLD-MCNC: 13.7 G/DL — SIGNIFICANT CHANGE UP (ref 11.5–15.5)
IMM GRANULOCYTES NFR BLD AUTO: 0.3 % — SIGNIFICANT CHANGE UP (ref 0–1.5)
LYMPHOCYTES # BLD AUTO: 2.26 K/UL — SIGNIFICANT CHANGE UP (ref 1–3.3)
LYMPHOCYTES # BLD AUTO: 31 % — SIGNIFICANT CHANGE UP (ref 13–44)
MCHC RBC-ENTMCNC: 31.7 PG — SIGNIFICANT CHANGE UP (ref 27–34)
MCHC RBC-ENTMCNC: 34.1 % — SIGNIFICANT CHANGE UP (ref 32–36)
MCV RBC AUTO: 93.1 FL — SIGNIFICANT CHANGE UP (ref 80–100)
MONOCYTES # BLD AUTO: 0.51 K/UL — SIGNIFICANT CHANGE UP (ref 0–0.9)
MONOCYTES NFR BLD AUTO: 7 % — SIGNIFICANT CHANGE UP (ref 2–14)
NEUTROPHILS # BLD AUTO: 4.4 K/UL — SIGNIFICANT CHANGE UP (ref 1.8–7.4)
NEUTROPHILS NFR BLD AUTO: 60.5 % — SIGNIFICANT CHANGE UP (ref 43–77)
NRBC # FLD: 0 K/UL — SIGNIFICANT CHANGE UP (ref 0–0)
PLATELET # BLD AUTO: 229 K/UL — SIGNIFICANT CHANGE UP (ref 150–400)
PMV BLD: 11 FL — SIGNIFICANT CHANGE UP (ref 7–13)
POTASSIUM SERPL-MCNC: 4 MMOL/L — SIGNIFICANT CHANGE UP (ref 3.5–5.3)
POTASSIUM SERPL-SCNC: 4 MMOL/L — SIGNIFICANT CHANGE UP (ref 3.5–5.3)
PROT SERPL-MCNC: 8 G/DL — SIGNIFICANT CHANGE UP (ref 6–8.3)
RBC # BLD: 4.32 M/UL — SIGNIFICANT CHANGE UP (ref 3.8–5.2)
RBC # FLD: 11.3 % — SIGNIFICANT CHANGE UP (ref 10.3–14.5)
SODIUM SERPL-SCNC: 138 MMOL/L — SIGNIFICANT CHANGE UP (ref 135–145)
WBC # BLD: 7.28 K/UL — SIGNIFICANT CHANGE UP (ref 3.8–10.5)
WBC # FLD AUTO: 7.28 K/UL — SIGNIFICANT CHANGE UP (ref 3.8–10.5)

## 2020-10-29 PROCEDURE — 76830 TRANSVAGINAL US NON-OB: CPT | Mod: 26

## 2020-10-29 PROCEDURE — 99285 EMERGENCY DEPT VISIT HI MDM: CPT

## 2020-10-29 PROCEDURE — 99213 OFFICE O/P EST LOW 20 MIN: CPT | Mod: GC

## 2020-10-29 RX ORDER — METHOTREXATE 2.5 MG/1
82 TABLET ORAL ONCE
Refills: 0 | Status: COMPLETED | OUTPATIENT
Start: 2020-10-29 | End: 2020-10-29

## 2020-10-29 NOTE — PHYSICAL EXAM
[Appropriately responsive] : appropriately responsive [Alert] : alert [No Acute Distress] : no acute distress [Soft] : soft [Non-tender] : non-tender [Non-distended] : non-distended [No Mass] : no mass

## 2020-10-29 NOTE — CONSULT NOTE ADULT - PROBLEM SELECTOR RECOMMENDATION 9
-pt  on the inappropriate decrease in bHCG. Pt will get another dose of mtx and will return for -pt  on the inappropriate decrease in bHCG. Pt will get another dose of mtx and will return for rpt bhcg on day 4 and day 7  -cbc/cmp/coags reviewed  -Ectopic precautions reviewed with pt  -mtx consents signed with patient  Pt evaluated by Dr. Vesna Gant, [email or web address]

## 2020-10-29 NOTE — ED PROVIDER NOTE - PATIENT PORTAL LINK FT
You can access the FollowMyHealth Patient Portal offered by [facility] by registering at the following website: [email or web address]. By joining [facility]’s FollowMyHealth portal, you will also be able to view your health information using other applications (apps) compatible with our system.

## 2020-10-29 NOTE — END OF VISIT
[] : Resident [FreeTextEntry3] : pt escorted to ED by Dr. Cox due to PUL w/plateuing beta hcg days 4 and 7 for sono and re-evaluation.  Frankie whitley\ethan DENT

## 2020-10-29 NOTE — CONSULT NOTE ADULT - ASSESSMENT
25y old  LMP  who presents for f/u after receiving mtx on 10/22 for working diagnosis of ectopic pregnancy. Bhcg trend as follows: 71 (10/14) > 116 (10/18) > 127 (10/20) >188 (s/p mtx on 10/22)-> 196(10/26)->188(10/29). No sonographic or clinical evidence of ectopic/ruptured ectopic pregnancy. Pt is HD stable.

## 2020-10-29 NOTE — ED PROVIDER NOTE - CLINICAL SUMMARY MEDICAL DECISION MAKING FREE TEXT BOX
A: 24 y/o F  (last miscarriage at 16 wks) sent in for transvaginal US and labs to determine status of miscarriage and determine need for medical intervention   P: US, labs, reassess A: 24 y/o F  (last miscarriage at 16 wks in March) sent in for transvaginal US and labs to determine status of miscarriage and determine need for medical/surgical intervention  P: US, labs, consult OBMelanie Hill: 26 yo , had miscarriage #1 at 16 weeks, needed a d&C.  lmp for this pregnancy , 2 weeks ago came to ED with concerns for miscarriage, but no iup found, repeated visits with non dropping bhacg.  given methotrexate 10/23, still no drop in hcg, went to clinic today hcg done 186 still not dropping.  sent here for us and GYN eval for preg unknown location failed methotrexate  pt awake, alert, lucid, understands the concerns, abd soft, no vaginal bleeding on exam.  gyn alerted to pt, US ordered.

## 2020-10-29 NOTE — ED PROVIDER NOTE - PROGRESS NOTE DETAILS
[place]: Pt received methotrexate. Advised to follow up on Day 4 and 7 for repeat blood work. Pt verbalizes agreement and understanding.

## 2020-10-29 NOTE — ED PROVIDER NOTE - OBJECTIVE STATEMENT
24 y/o F,  sent in from the clinic for transvaginal US to determine status of pregnancy. Pt has had vaginal bleeding for over a week. Seen in clinic last week and told to return for blood work. Went back in today and was told that her hCG had not dropped. She reports she needs to change her pad once ever 6 hrs, no large clots or POC. Reports intermittent pelvic cramping that lasts <30 mins and self resolves. Denies f/c, HA, cough, SOB, CP, n/v/d/c, back pain, urinary complaints, abnl vaginal discharge or other associated sx. No other medical complaints at this time. 26 y/o F,  (miscarriage @16wks in March), LMP August (missed Sept period) sent in from the clinic for transvaginal US and repeat labs to determine status of pregnancy. Pt had vaginal bleeding that began over a week ago. She has been seen here several times in the past 2x weeks to check hCG trends and US. No IUP seen on US 10/22 - suspicion for ectopic. MTX given on 10/23. Pt was seen today and told her hCG (last 190, today 186) has not been trending down correctly.   She reports continuos vaginal bleeding and that she needs to change her pad once ever 6 hrs, no large clots or POC. Reports intermittent pelvic cramping that lasts <30 mins and self resolves. Denies f/c, HA, cough, SOB, CP, n/v/d/c, back pain, urinary complaints, abnl vaginal discharge or other associated sx. No other medical complaints at this time.

## 2020-10-29 NOTE — HISTORY OF PRESENT ILLNESS
[FreeTextEntry1] :  - [id number]\par \par 24 y/o  LMP  presents for follow up after receiving methotrexate on 10/23. Patient originally followed for vaginal spotting. \par Reports abdominal pain at home since last visit. Improving. 4/10 at worst.\par  Denies nausea/vomiting. Denies fevers/chills. \par \par 10/14: 71\par 10/18: 116\par 10/20: 127\par 10/22: 188 (Methotrexate given after midnight) \par 10/26: 193.6\par 10/29: 186.4\par

## 2020-10-29 NOTE — ED ADULT TRIAGE NOTE - CHIEF COMPLAINT QUOTE
Sent from clinic for an US. States she was seen last week for miscarriage and today hcg level was still elevated. c/o pelvic pain and vaginal bleeding x 3 wks.     [place] (): [phone]

## 2020-10-29 NOTE — CONSULT NOTE ADULT - ATTENDING COMMENTS
24y/o  LMP  presents for f/u after receiving mtx on 10/22 for presumed ectopic pregnancy; patient with less than expected decline in BhCG level; to receive a 2nd dose of MTX.

## 2020-10-29 NOTE — REVIEW OF SYSTEMS
[Abdominal Pain] : abdominal pain [Fever] : no fever [Chills] : no chills [Dyspnea] : no dyspnea [Chest Pain] : no chest pain [Vomiting] : no vomiting

## 2020-10-29 NOTE — ED PROVIDER NOTE - ATTENDING CONTRIBUTION TO CARE
Sergio: 26 yo , had miscarriage #1 at 16 weeks, needed a d&C.  lmp for this pregnancy , 2 weeks ago came to ED with concerns for miscarriage, but no iup found, repeated visits with non dropping bhacg.  given methotrexate 10/23, still no drop in hcg, went to clinic today hcg done 186 still not dropping.  sent here for us and GYN eval for preg unknown location failed methotrexate  pt awake, alert, lucid, understands the concerns, abd soft, no vaginal bleeding on exam.  gyn alerted to pt, US ordered.    I performed a history and physical exam of the patient and discussed their management with the resident and /or advanced care provider. I reviewed the resident and /or ACP's note and agree with the documented findings and plan of care. My medical decison making and observations are found above.

## 2020-10-29 NOTE — ED PROVIDER NOTE - NSFOLLOWUPINSTRUCTIONS_ED_ALL_ED_FT
Return for repeat blood work on day 4 11/3/20 and day 7 11/6/20. You can either go to your OBGYN or return to the Emergency Department   If any worsening pain or vaginal bleeding return to Emergency Department sooner.

## 2020-10-29 NOTE — CONSULT NOTE ADULT - SUBJECTIVE AND OBJECTIVE BOX
#[id number] #[id number]     GYN ED CONSULT NOTE    SUBJECTIVE:     25y old  LMP  who presents for f/u up for PUL, with working diagnosis of ectopic pregnancy. Pt was seen in ED on 10/22 and received mtx. She was in gyn clinic today and her day 4 and day 7 did not have the appropriate downtrending bhcg          Bhcg trend as follows: 71 (10/14) > 116 (10/18) > 127 (10/20) >188 (s/p mtx on 10/22)-> 196(10/26)->188(10/29)    Transvaginal ultrasound has repeatedly showed no IUP, FF, or adnexal masses. Today she denies any SOB/CP/n/v/dizziness, denies abdominal pain and endorses some minimal spotting.                                          MEDS: none  ALL: nkda  SOCH: denies t/e/d; safe at home  FAMH: denies fam hx of breast/uterine/ovarian/colon cancer  ROS: negative except per hpi    OBJECTIVE:    VITAL SIGNS:  Vital Signs Last 24 Hrs  T(C): 36.3 (29 Oct 2020 21:34), Max: 36.4 (29 Oct 2020 17:19)  T(F): 97.4 (29 Oct 2020 21:34), Max: 97.5 (29 Oct 2020 17:19)  HR: 73 (29 Oct 2020 21:34) (73 - 80)  BP: 114/63 (29 Oct 2020 21:34) (111/59 - 114/63)  BP(mean): --  RR: 16 (29 Oct 2020 21:34) (16 - 16)  SpO2: 100% (29 Oct 2020 21:34) (100% - 100%)  Height (cm): 160 (10-29-20 @ 17:19)  CAPILLARY BLOOD GLUCOSE            PHYSICAL EXAM:  GEN: NAD, A&Ox3  CV: RRR, no m/g/r  LUNGS: CTAB  ABD: soft, NT, ND, +BS  SPECULUM:  PELVIC:  EXT: WWP, no edema/TTP    LABS:                        13.7   7.28  )-----------( 229      ( 29 Oct 2020 19:02 )             40.2   baso 0.4    eos 0.8    imm gran 0.3    lymph 31.0   mono 7.0    poly 60.5       10-29    138  |  103  |  13  ----------------------------<  101<H>  4.0   |  23  |  0.58    Ca    9.5      29 Oct 2020 19:02    TPro  8.0  /  Alb  5.1<H>  /  TBili  0.5  /  DBili  x   /  AST  17  /  ALT  14  /  AlkPhos  96  10-29          RADIOLOGY:  < from: US Transvaginal (10.29.20 @ 20:22) >  EXAM:  US TRANSVAGINAL        PROCEDURE DATE:  Oct 29 2020         INTERPRETATION:  CLINICAL INFORMATION: Status post miscarriage    LMP: 2020.    COMPARISON: None available.    TECHNIQUE:  Endovaginal pelvic sonogram only. Color and Spectral Doppler was performed.    FINDINGS:    Uterus: 7.4 x 2.5 x 4.3 cm. Within normal limits.  Endometrium: 3 mm. Within normal limits.    Right ovary: 3.9 x 2.5 x 3.4 cm. Within normal limits. Normal arterial and venous waveforms.  Left ovary: 3.7 x 2.1 x 1.7 cm. Within normal limits. Normal arterial and venous waveforms.    Fluid: None.    IMPRESSION:  Normal pelvic sonogram.              ROBYN MADDOX MD; Resident Radiology  This document has been electronically signed.  BLUE MARTE MD; Attending Radiologist  This document has been electronically signed. Oct 29 2020 10:02PM    < end of copied text >   #[id number]     GYN ED CONSULT NOTE    SUBJECTIVE:     25y old  LMP  who presents for f/u up for PUL, with working diagnosis of ectopic pregnancy. Pt was seen in ED on 10/22 and received mtx. She was in gyn clinic today and her day 4 and day 7 did not have the appropriate downtrending bhcg      Bhcg trend as follows: 71 (10/14) > 116 (10/18) > 127 (10/20) >188 (s/p mtx on 10/22)-> 196(10/26)->188(10/29)    Transvaginal ultrasound has repeatedly showed no IUP, FF, or adnexal masses. Today she denies any SOB/CP/n/v/dizziness, denies abdominal pain and endorses some minimal spotting.                                          MEDS: none  ALL: nkda  SOCH: denies t/e/d; safe at home  FAMH: denies fam hx of breast/uterine/ovarian/colon cancer  ROS: negative except per hpi    OBJECTIVE:    VITAL SIGNS:  Vital Signs Last 24 Hrs  T(C): 36.3 (29 Oct 2020 21:34), Max: 36.4 (29 Oct 2020 17:19)  T(F): 97.4 (29 Oct 2020 21:34), Max: 97.5 (29 Oct 2020 17:19)  HR: 73 (29 Oct 2020 21:34) (73 - 80)  BP: 114/63 (29 Oct 2020 21:34) (111/59 - 114/63)  BP(mean): --  RR: 16 (29 Oct 2020 21:34) (16 - 16)  SpO2: 100% (29 Oct 2020 21:34) (100% - 100%)  Height (cm): 160 (10-29-20 @ 17:19)  CAPILLARY BLOOD GLUCOSE            PHYSICAL EXAM:  GEN: NAD, A&Ox3  CV: RRR, no m/g/r  LUNGS: CTAB  ABD: soft, NT, ND, +BS  SPECULUM:  PELVIC:  EXT: WWP, no edema/TTP    LABS:                        13.7   7.28  )-----------( 229      ( 29 Oct 2020 19:02 )             40.2   baso 0.4    eos 0.8    imm gran 0.3    lymph 31.0   mono 7.0    poly 60.5       10-29    138  |  103  |  13  ----------------------------<  101<H>  4.0   |  23  |  0.58    Ca    9.5      29 Oct 2020 19:02    TPro  8.0  /  Alb  5.1<H>  /  TBili  0.5  /  DBili  x   /  AST  17  /  ALT  14  /  AlkPhos  96  10-29          RADIOLOGY:  < from: US Transvaginal (10.29.20 @ 20:22) >  EXAM:  US TRANSVAGINAL        PROCEDURE DATE:  Oct 29 2020         INTERPRETATION:  CLINICAL INFORMATION: Status post miscarriage    LMP: 2020.    COMPARISON: None available.    TECHNIQUE:  Endovaginal pelvic sonogram only. Color and Spectral Doppler was performed.    FINDINGS:    Uterus: 7.4 x 2.5 x 4.3 cm. Within normal limits.  Endometrium: 3 mm. Within normal limits.    Right ovary: 3.9 x 2.5 x 3.4 cm. Within normal limits. Normal arterial and venous waveforms.  Left ovary: 3.7 x 2.1 x 1.7 cm. Within normal limits. Normal arterial and venous waveforms.    Fluid: None.    IMPRESSION:  Normal pelvic sonogram.              ROBYN MADDOX MD; Resident Radiology  This document has been electronically signed.  BLUE MARTE MD; Attending Radiologist  This document has been electronically signed. Oct 29 2020 10:02PM    < end of copied text >

## 2020-10-30 VITALS
RESPIRATION RATE: 16 BRPM | HEART RATE: 81 BPM | SYSTOLIC BLOOD PRESSURE: 107 MMHG | DIASTOLIC BLOOD PRESSURE: 60 MMHG | OXYGEN SATURATION: 100 % | TEMPERATURE: 98 F

## 2020-10-30 DIAGNOSIS — O36.80X0 PREGNANCY WITH INCONCLUSIVE FETAL VIABILITY, NOT APPLICABLE OR UNSPECIFIED: ICD-10-CM

## 2020-10-30 RX ADMIN — METHOTREXATE 82 MILLIGRAM(S): 2.5 TABLET ORAL at 01:37

## 2020-11-02 ENCOUNTER — NON-APPOINTMENT (OUTPATIENT)
Age: 25
End: 2020-11-02

## 2020-11-03 ENCOUNTER — EMERGENCY (EMERGENCY)
Facility: HOSPITAL | Age: 25
LOS: 1 days | Discharge: HOME | End: 2020-11-03
Attending: EMERGENCY MEDICINE | Admitting: EMERGENCY MEDICINE
Payer: MEDICAID

## 2020-11-03 VITALS
DIASTOLIC BLOOD PRESSURE: 57 MMHG | SYSTOLIC BLOOD PRESSURE: 108 MMHG | HEIGHT: 63 IN | TEMPERATURE: 98 F | HEART RATE: 84 BPM | OXYGEN SATURATION: 100 % | RESPIRATION RATE: 18 BRPM

## 2020-11-03 LAB — HCG SERPL-ACNC: 59.08 MIU/ML — SIGNIFICANT CHANGE UP

## 2020-11-03 PROCEDURE — 99283 EMERGENCY DEPT VISIT LOW MDM: CPT

## 2020-11-03 NOTE — ED PROVIDER NOTE - NSFOLLOWUPINSTRUCTIONS_ED_ALL_ED_FT
(1) Follow up with your primary care physician as discussed. In addition, we did not find evidence of a life threatening illness on your testing here today, but listed below are the specialists that will be necessary to see as an outpatient to continue the workup.  Please call the numbers listed below or [phone] to set up the necessary appointments.  (2) Immediately seek care at your nearest emergency room if your worsen, persist, or do not resolve   (3) Take Tylenol (up to 1000mg or 1 g)  and/or Motrin (up to 600mg) up to every 6 hours as needed for pain.   (4) Please follow up with OB in their clinic on 11/6/2020 for repeat HCG testing.     Your HCG level today has downtrended from your previous test.

## 2020-11-03 NOTE — ED PROVIDER NOTE - PHYSICAL EXAMINATION
CONSTITUTIONAL: NAD. Awake and conversive, cooperative with exam  EYES: EOMI, conjunctiva and sclera clear  ENMT: MMM   NECK: Supple   RESPIRATORY: Normal respiratory effort, CTAB, no wheezes, rales, or rhonchi  CARDIOVASCULAR: RRR, normal S1 and S2, no MRG, distal pulses 2+ bilaterally, capillary refill < 2 seconds, no peripheral edema  ABDOMEN: Soft, non-distended, no TTP, +BS, no rebound/guarding  MUSCULOSKELETAL: No clubbing or cyanosis of digits, no joint swelling or tenderness  NEURO: AO to person, place, and time; following commands, moving all extremities spontaneously  PSYCH: appropriate affect   SKIN: No rashes; no palpable lesions

## 2020-11-03 NOTE — CONSULT NOTE ADULT - SUBJECTIVE AND OBJECTIVE BOX
PETROS LAWSON is a 25y old  LMP  who presents for f/u after receiving mtx twice, first on 10/22 in setting of PUL and the second after she had an inappropriate response to the first dose. Bhcg trend as follows: 71 (10/14) > 116 (10/18) > 127 (10/20) >188 (10/22 - MTX#1 given)-> 196(10/26)->188(10/29 - MTX#2 given) -> 59 (11/3). Pt has never had any No sonographic or clinical evidence of ectopic/ruptured ectopic pregnancy. Pt is HD stable and denies any abdominal pain or vaginal bleeding besides minimal brown spotting days ago.       REVIEW OF SYSTEMS:  CONSTITUTIONAL: No weakness, fevers or chills  EYES/ENT: No visual changes  NECK: No pain or stiffness  RESPIRATORY: No shortness of breath  CARDIOVASCULAR: No chest pain or palpitations  GASTROINTESTINAL: No abdominal or epigastric pain. No nausea, vomiting; No diarrhea or constipation  GENITOURINARY: No dysuria, frequency or hematuria  NEUROLOGICAL: No headache, LOC  All other review of systems is negative unless indicated above      Allergies  No Known Allergies/Intolerances      PAST MEDICAL & SURGICAL HISTORY:  Missed   Migraines  No significant past surgical history      FAMILY HISTORY:  Father : heart disease, DM     FH: type 2 diabetes  father    SOCIAL HISTORY:  Denies x3    Vital Signs Last 24 Hrs  T(C): 36.4 (2020 16:25), Max: 36.4 (2020 16:25)  T(F): 97.6 (2020 16:25), Max: 97.6 (2020 16:25)  HR: 84 (2020 16:25) (84 - 84)  BP: 108/57 (2020 16:25) (108/57 - 108/57)  BP(mean): --  RR: 18 (2020 16:25) (18 - 18)  SpO2: 100% (2020 16:25) (100% - 100%)    PHYSICAL EXAM:  Constitutional: NAD, awake and alert, well-developed  HEENT: Normal Hearing,  Neck: Soft and supple  Gastrointestinal: soft, nontender, nondistended, no guarding, no rebound  Genitourinary: no vaginal bleeding, cervical os closed, no cervical motion tenderness, no tenderness with bimanual exam, no adnexal masses   Extremities: No peripheral edema  Neurological: A/O x 3, no focal deficits  Skin: No rashes PETROS LAWSON is a 25y old  LMP  who presents for f/u after receiving mtx twice, first on 10/22 in setting of PUL and the second 10/29 after she had an inappropriate response to the first dose. Bhcg trend as follows: 71 (10/14) > 116 (10/18) > 127 (10/20) >188 (10/22 - MTX#1 given)-> 196(10/26)->188(10/29 - MTX#2 given) -> 59 (11/3). Pt has never had any No sonographic or clinical evidence of ectopic/ruptured ectopic pregnancy. Pt is HD stable and denies any abdominal pain or vaginal bleeding besides minimal brown spotting days ago.       REVIEW OF SYSTEMS:  CONSTITUTIONAL: No weakness, fevers or chills  EYES/ENT: No visual changes  NECK: No pain or stiffness  RESPIRATORY: No shortness of breath  CARDIOVASCULAR: No chest pain or palpitations  GASTROINTESTINAL: No abdominal or epigastric pain. No nausea, vomiting; No diarrhea or constipation  GENITOURINARY: No dysuria, frequency or hematuria  NEUROLOGICAL: No headache, LOC  All other review of systems is negative unless indicated above      Allergies  No Known Allergies/Intolerances      PAST MEDICAL & SURGICAL HISTORY:  Missed   Migraines  No significant past surgical history      FAMILY HISTORY:  Father : heart disease, DM     FH: type 2 diabetes  father    SOCIAL HISTORY:  Denies x3    Vital Signs Last 24 Hrs  T(C): 36.4 (2020 16:25), Max: 36.4 (2020 16:25)  T(F): 97.6 (2020 16:25), Max: 97.6 (2020 16:25)  HR: 84 (2020 16:25) (84 - 84)  BP: 108/57 (2020 16:25) (108/57 - 108/57)  BP(mean): --  RR: 18 (2020 16:25) (18 - 18)  SpO2: 100% (2020 16:25) (100% - 100%)    PHYSICAL EXAM:  Constitutional: NAD, awake and alert, well-developed  HEENT: Normal Hearing,  Neck: Soft and supple  Gastrointestinal: soft, nontender, nondistended, no guarding, no rebound  Genitourinary: no vaginal bleeding, cervical os closed, no cervical motion tenderness, no tenderness with bimanual exam, no adnexal masses   Extremities: No peripheral edema  Neurological: A/O x 3, no focal deficits  Skin: No rashes

## 2020-11-03 NOTE — ED PROVIDER NOTE - NS ED ROS FT
Gen: No fever, chills, night sweats. Normal appetite  Eyes: No changes in vision   Resp: No cough or trouble breathing  Cardiovascular: No chest pain or palpitation  Gastroenteric: No nausea, vomiting, diarrhea or constipation  :  No change in urine output, dysuria or hematuria   MS: No joint or muscle pain  Skin: No rashes, lacerations, wounds or abrasions   Neuro: No headache; no abnormal movements

## 2020-11-03 NOTE — ED PROVIDER NOTE - FAMILY HISTORY
Father  Still living? Unknown  FH: type 2 diabetes, Age at diagnosis: Age Unknown  FH: heart disease, Age at diagnosis: Age Unknown

## 2020-11-03 NOTE — ED PROVIDER NOTE - CLINICAL SUMMARY MEDICAL DECISION MAKING FREE TEXT BOX
24 y/o F,  LMP  presenting for HCG check post methotrexate administration 10/29 and 10/23. Pt denies any other complaints.

## 2020-11-03 NOTE — ED ADULT NURSE NOTE - OBJECTIVE STATEMENT
Pt. is A&Ox3 presents to ER to follow up on HCG labs. Pt. states she was recently told she had a miscarriage, needed to obtain follow up HCG results. Pt. denies CP, SOB, N/V, fevers, chills, and diarrhea. Pt. respirations even and unlabored, abdomen nontender and nondistended, skin intact. Safety precautions obtained.

## 2020-11-03 NOTE — CONSULT NOTE ADULT - ATTENDING COMMENTS
26y/o  LMP   for f/u of BhCG level after receiving second dose of mtx for pregnancy of unknown location, BhCG with appropriate decline on day 4 to keep day 7 follow up.

## 2020-11-03 NOTE — ED ADULT TRIAGE NOTE - CHIEF COMPLAINT QUOTE
Pt c/o vaginal bleeding x 2 weeks and here to check her " pregnancy hormone". Pt here last week and given methotrexate. LMP 9/28

## 2020-11-03 NOTE — CONSULT NOTE ADULT - ASSESSMENT
PETROS LAWSON is a 25y old  LMP  who presents for f/u after receiving mtx twice, first on 10/22 in setting of PUL and the second after she had an inappropriate response to the first dose. Bhcg trend as follows: 71 (10/14) > 116 (10/18) > 127 (10/20) >188 (10/22 - MTX#1 given)-> 196(10/26)->188(10/29 - MTX#2 given) -> 59 (11/3). Pt has never had any No sonographic or clinical evidence of ectopic/ruptured ectopic pregnancy. Pt is HD stable and denies any abdominal pain or vaginal bleeding. Pt should follow up in clinic on Day 7 for bhcg f/u. She has been given precautions under which to return to the ED if needed.     Pt discussed with Dr. Malagon.    Fallon Tao MD  OBGYN PGY2

## 2020-11-03 NOTE — ED PROVIDER NOTE - OBJECTIVE STATEMENT
26 y/o F,  LMP  presenting for HCG check. Reports getting methotrexate 6 days ago, on 10/23 prior to that. Vaginal bleeding stopped , has had intermittent spotting since. No abdominal pain, nausea or vomiting. No fevers, chills or night sweats.

## 2020-11-03 NOTE — ED PROVIDER NOTE - ATTENDING CONTRIBUTION TO CARE
I have seen and examined the patient on the patient´s visit date. I have reviewed the note written by Magdalena Gonzalez MD, on that visit day. I have supervised and participated as necessary in the performance of procedures indicated for patient management and was available at all phases of the patient´s visit when needed. We discussed the history, physical exam findings, management plan, and  medical decision making. I have made my additions, exceptions, and revisions within the chart and I agree with H and P as documented in its entirety. The data and my interpretation of any data collected from labs, interventions and imaging appear below as well as my independent medical decision making and considerations.      The patient is a 25y Female who has a past medical and surgery history of Missed  and Migraines PTED with hcg check as part of followup to methotrexate. Vag bleeding has stopped and pt is otherwise asymptomatic  Vital Signs Stable  PE: as described; my additions and exceptions are noted in the chart  Plan   repeat HCG   if improving will return for repeat check to ensure it is trending downward  RTED PRN

## 2020-11-04 ENCOUNTER — APPOINTMENT (OUTPATIENT)
Dept: OBGYN | Facility: HOSPITAL | Age: 25
End: 2020-11-04

## 2020-11-05 ENCOUNTER — OUTPATIENT (OUTPATIENT)
Dept: OUTPATIENT SERVICES | Facility: HOSPITAL | Age: 25
LOS: 1 days | End: 2020-11-05
Payer: MEDICAID

## 2020-11-05 ENCOUNTER — APPOINTMENT (OUTPATIENT)
Dept: OBGYN | Facility: CLINIC | Age: 25
End: 2020-11-05
Payer: MEDICAID

## 2020-11-05 DIAGNOSIS — Z00.00 ENCOUNTER FOR GENERAL ADULT MEDICAL EXAMINATION WITHOUT ABNORMAL FINDINGS: ICD-10-CM

## 2020-11-05 PROCEDURE — 99213 OFFICE O/P EST LOW 20 MIN: CPT | Mod: NC,25

## 2020-11-05 PROCEDURE — 36415 COLL VENOUS BLD VENIPUNCTURE: CPT | Mod: NC
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
## 2020-11-06 DIAGNOSIS — O00.90 UNSPECIFIED ECTOPIC PREGNANCY WITHOUT INTRAUTERINE PREGNANCY: ICD-10-CM

## 2020-11-06 LAB
BASOPHILS # BLD AUTO: 0.03 K/UL
BASOPHILS NFR BLD AUTO: 0.5 %
EOSINOPHIL # BLD AUTO: 0.04 K/UL
EOSINOPHIL NFR BLD AUTO: 0.7 %
HCT VFR BLD CALC: 40.9 %
HGB BLD-MCNC: 13.6 G/DL
IMM GRANULOCYTES NFR BLD AUTO: 0.2 %
LYMPHOCYTES # BLD AUTO: 2.04 K/UL
LYMPHOCYTES NFR BLD AUTO: 36.4 %
MAN DIFF?: NORMAL
MCHC RBC-ENTMCNC: 32.2 PG
MCHC RBC-ENTMCNC: 33.3 GM/DL
MCV RBC AUTO: 96.7 FL
MONOCYTES # BLD AUTO: 0.44 K/UL
MONOCYTES NFR BLD AUTO: 7.8 %
NEUTROPHILS # BLD AUTO: 3.05 K/UL
NEUTROPHILS NFR BLD AUTO: 54.4 %
PLATELET # BLD AUTO: 223 K/UL
RBC # BLD: 4.23 M/UL
RBC # FLD: 11.9 %
WBC # FLD AUTO: 5.61 K/UL

## 2020-11-06 PROCEDURE — G0463: CPT

## 2020-11-06 PROCEDURE — 84702 CHORIONIC GONADOTROPIN TEST: CPT

## 2020-11-06 PROCEDURE — 36415 COLL VENOUS BLD VENIPUNCTURE: CPT

## 2020-11-06 PROCEDURE — 85025 COMPLETE CBC W/AUTO DIFF WBC: CPT

## 2020-11-11 ENCOUNTER — OUTPATIENT (OUTPATIENT)
Dept: OUTPATIENT SERVICES | Facility: HOSPITAL | Age: 25
LOS: 1 days | End: 2020-11-11
Payer: MEDICAID

## 2020-11-11 ENCOUNTER — APPOINTMENT (OUTPATIENT)
Dept: OBGYN | Facility: CLINIC | Age: 25
End: 2020-11-11
Payer: MEDICAID

## 2020-11-11 VITALS
DIASTOLIC BLOOD PRESSURE: 68 MMHG | OXYGEN SATURATION: 98 % | WEIGHT: 144 LBS | HEART RATE: 89 BPM | TEMPERATURE: 97.8 F | BODY MASS INDEX: 23.14 KG/M2 | SYSTOLIC BLOOD PRESSURE: 112 MMHG | HEIGHT: 66 IN

## 2020-11-11 DIAGNOSIS — Z00.00 ENCOUNTER FOR GENERAL ADULT MEDICAL EXAMINATION WITHOUT ABNORMAL FINDINGS: ICD-10-CM

## 2020-11-11 LAB — HCG SERPL-MCNC: 49 MIU/ML

## 2020-11-11 PROCEDURE — 36415 COLL VENOUS BLD VENIPUNCTURE: CPT

## 2020-11-11 PROCEDURE — G0463: CPT

## 2020-11-11 PROCEDURE — 99213 OFFICE O/P EST LOW 20 MIN: CPT | Mod: NC,25

## 2020-11-11 PROCEDURE — 36415 COLL VENOUS BLD VENIPUNCTURE: CPT | Mod: NC

## 2020-11-11 PROCEDURE — 84702 CHORIONIC GONADOTROPIN TEST: CPT

## 2020-11-11 NOTE — HISTORY OF PRESENT ILLNESS
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 Desires contraception. Not sexually active presently. Advised to wait until HCG has resumed to a neg number [FreeTextEntry1] : 9/28/2020

## 2020-11-12 ENCOUNTER — NON-APPOINTMENT (OUTPATIENT)
Age: 25
End: 2020-11-12

## 2020-11-12 DIAGNOSIS — Z87.59 PERSONAL HISTORY OF OTHER COMPLICATIONS OF PREGNANCY, CHILDBIRTH AND THE PUERPERIUM: ICD-10-CM

## 2020-11-12 LAB — HCG SERPL-MCNC: 2 MIU/ML

## 2020-11-13 NOTE — PLAN
[FreeTextEntry1] : 24 y/o  LMP  presents for follow up day 4 methotrexate \par \par Plan\par - Return for day 7 bhcg \par - Patient told to arrive at 2pm for next appt\par - GYN team to call patient to remind of her appt. \par LEXI Orellana PGY3\par d/w Dr. Washburn

## 2020-11-13 NOTE — HISTORY OF PRESENT ILLNESS
[FreeTextEntry1] : 26 y/o  LMP  presents for follow up after receiving methotrexate on 10/23. Patient originally followed for vaginal spotting. Patient denies abdominal/ pelvic pain. Still endorses vaginal spotting. \par \par 10/14: 71\par 10/18: 116\par 10/20: 127\par 10/22: 188 (Methotrexate given after midnight) \par \par

## 2020-11-13 NOTE — REASON FOR VISIT
[Follow-Up] : a follow-up evaluation of [[employer] ] : provided by [employer]   [TWNoteComboBox1] : [place]

## 2020-11-18 ENCOUNTER — OUTPATIENT (OUTPATIENT)
Dept: OUTPATIENT SERVICES | Facility: HOSPITAL | Age: 25
LOS: 1 days | End: 2020-11-18
Payer: MEDICAID

## 2020-11-18 ENCOUNTER — APPOINTMENT (OUTPATIENT)
Dept: OBGYN | Facility: CLINIC | Age: 25
End: 2020-11-18
Payer: MEDICAID

## 2020-11-18 VITALS
BODY MASS INDEX: 23.3 KG/M2 | HEIGHT: 66 IN | TEMPERATURE: 98.2 F | HEART RATE: 77 BPM | SYSTOLIC BLOOD PRESSURE: 113 MMHG | RESPIRATION RATE: 18 BRPM | OXYGEN SATURATION: 100 % | WEIGHT: 145 LBS | DIASTOLIC BLOOD PRESSURE: 69 MMHG

## 2020-11-18 DIAGNOSIS — Z83.3 FAMILY HISTORY OF DIABETES MELLITUS: ICD-10-CM

## 2020-11-18 DIAGNOSIS — Z00.00 ENCOUNTER FOR GENERAL ADULT MEDICAL EXAMINATION WITHOUT ABNORMAL FINDINGS: ICD-10-CM

## 2020-11-18 DIAGNOSIS — Z86.69 PERSONAL HISTORY OF OTHER DISEASES OF THE NERVOUS SYSTEM AND SENSE ORGANS: ICD-10-CM

## 2020-11-18 DIAGNOSIS — Z78.9 OTHER SPECIFIED HEALTH STATUS: ICD-10-CM

## 2020-11-18 DIAGNOSIS — Z84.1 FAMILY HISTORY OF DISORDERS OF KIDNEY AND URETER: ICD-10-CM

## 2020-11-18 PROCEDURE — 87389 HIV-1 AG W/HIV-1&-2 AB AG IA: CPT

## 2020-11-18 PROCEDURE — 87591 N.GONORRHOEAE DNA AMP PROB: CPT

## 2020-11-18 PROCEDURE — 99213 OFFICE O/P EST LOW 20 MIN: CPT | Mod: NC,25

## 2020-11-18 PROCEDURE — 36415 COLL VENOUS BLD VENIPUNCTURE: CPT

## 2020-11-18 PROCEDURE — 86803 HEPATITIS C AB TEST: CPT

## 2020-11-18 PROCEDURE — 36415 COLL VENOUS BLD VENIPUNCTURE: CPT | Mod: NC

## 2020-11-18 PROCEDURE — 87491 CHLMYD TRACH DNA AMP PROBE: CPT

## 2020-11-18 PROCEDURE — 87340 HEPATITIS B SURFACE AG IA: CPT

## 2020-11-18 PROCEDURE — 86780 TREPONEMA PALLIDUM: CPT

## 2020-11-18 PROCEDURE — G0463: CPT
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
## 2020-11-18 NOTE — COUNSELING
[Contraception/ Emergency Contraception/ Safe Sexual Practices] : contraception, emergency contraception, safe sexual practices [Preconception Care/ Fertility options] : preconception care, fertility options [STD (testing, results, tx)] : STD (testing, results, tx)

## 2020-11-19 LAB — HIV1+2 AB SPEC QL IA.RAPID: NONREACTIVE

## 2020-11-20 DIAGNOSIS — Z30.011 ENCOUNTER FOR INITIAL PRESCRIPTION OF CONTRACEPTIVE PILLS: ICD-10-CM

## 2020-11-20 DIAGNOSIS — Z01.419 ENCOUNTER FOR GYNECOLOGICAL EXAMINATION (GENERAL) (ROUTINE) WITHOUT ABNORMAL FINDINGS: ICD-10-CM

## 2020-11-20 LAB
C TRACH RRNA SPEC QL NAA+PROBE: NOT DETECTED
HBV SURFACE AG SER QL: NONREACTIVE
HCV AB SER QL: NONREACTIVE
HCV S/CO RATIO: 0.12 S/CO
N GONORRHOEA RRNA SPEC QL NAA+PROBE: NOT DETECTED
SOURCE TP AMPLIFICATION: NORMAL
T PALLIDUM AB SER QL IA: NEGATIVE

## 2020-12-08 ENCOUNTER — NON-APPOINTMENT (OUTPATIENT)
Age: 25
End: 2020-12-08

## 2020-12-09 ENCOUNTER — NON-APPOINTMENT (OUTPATIENT)
Age: 25
End: 2020-12-09

## 2020-12-11 ENCOUNTER — OUTPATIENT (OUTPATIENT)
Dept: OUTPATIENT SERVICES | Facility: HOSPITAL | Age: 25
LOS: 1 days | End: 2020-12-11
Payer: MEDICAID

## 2020-12-11 ENCOUNTER — APPOINTMENT (OUTPATIENT)
Dept: OBGYN | Facility: CLINIC | Age: 25
End: 2020-12-11
Payer: MEDICAID

## 2020-12-11 VITALS
BODY MASS INDEX: 22.82 KG/M2 | WEIGHT: 142 LBS | HEIGHT: 66 IN | SYSTOLIC BLOOD PRESSURE: 108 MMHG | HEART RATE: 99 BPM | RESPIRATION RATE: 18 BRPM | TEMPERATURE: 98 F | DIASTOLIC BLOOD PRESSURE: 79 MMHG | OXYGEN SATURATION: 98 %

## 2020-12-11 DIAGNOSIS — Z30.011 ENCOUNTER FOR INITIAL PRESCRIPTION OF CONTRACEPTIVE PILLS: ICD-10-CM

## 2020-12-11 DIAGNOSIS — O02.1 MISSED ABORTION: ICD-10-CM

## 2020-12-11 DIAGNOSIS — O36.80X0 PREGNANCY WITH INCONCLUSIVE FETAL VIABILITY, NOT APPLICABLE OR UNSPECIFIED: ICD-10-CM

## 2020-12-11 DIAGNOSIS — Z00.00 ENCOUNTER FOR GENERAL ADULT MEDICAL EXAMINATION WITHOUT ABNORMAL FINDINGS: ICD-10-CM

## 2020-12-11 DIAGNOSIS — O00.209: ICD-10-CM

## 2020-12-11 DIAGNOSIS — Z87.59 PERSONAL HISTORY OF OTHER COMPLICATIONS OF PREGNANCY, CHILDBIRTH AND THE PUERPERIUM: ICD-10-CM

## 2020-12-11 LAB — CYTOLOGY CVX/VAG DOC THIN PREP: ABNORMAL

## 2020-12-11 PROCEDURE — G0463: CPT

## 2020-12-11 PROCEDURE — 99213 OFFICE O/P EST LOW 20 MIN: CPT | Mod: NC

## 2020-12-11 NOTE — HISTORY OF PRESENT ILLNESS
[Patient reported PAP Smear was abnormal] : Patient reported PAP Smear was abnormal [N] : Patient reports normal menses [Y] : Positive pregnancy history [FreeTextEntry1] : Patient is here for results of papsmear and STD screening. [TextBox_4] : LIZETTE Valencia present for translation\par \par pt presents for review of abnormal pap result\par LGSIL noted\par remaining STD labs are normal\par Pt is currently on Apri, happy with method\par No gyn concerns today\par Pt was recently treated successfully for ectopic pregnancy with methotrexate, BHCG resolved as of 11/11. Does not desire a pregnancy at this time [PapSmeardate] : 11/23/20 [TextBox_31] : LSIL [LMPDate] : 9/28/20 [PGHxTotal] : 2 [PGHxFullTerm] : 0 [PGHxPremature] : 0 [PGHxAbortions] : 0 [[facility]] : 0 [PGHxABInduced] : 0 [PGHxABSpont] : 1 [PGxEctopic] : 1 [PGHxMultBirths] : 0

## 2020-12-11 NOTE — COUNSELING
[Contraception/ Emergency Contraception/ Safe Sexual Practices] : contraception, emergency contraception, safe sexual practices [STD (testing, results, tx)] : STD (testing, results, tx) [Other ___] : [unfilled] [FreeTextEntry2] : LGSIL result meaning and plan for monitoring discussed with pt. contraceptive pill questions answered

## 2020-12-14 DIAGNOSIS — Z71.2 PERSON CONSULTING FOR EXPLANATION OF EXAMINATION OR TEST FINDINGS: ICD-10-CM

## 2020-12-14 DIAGNOSIS — R87.612 LOW GRADE SQUAMOUS INTRAEPITHELIAL LESION ON CYTOLOGIC SMEAR OF CERVIX (LGSIL): ICD-10-CM

## 2021-01-14 ENCOUNTER — APPOINTMENT (OUTPATIENT)
Dept: INTERNAL MEDICINE | Facility: CLINIC | Age: 26
End: 2021-01-14

## 2021-02-22 ENCOUNTER — APPOINTMENT (OUTPATIENT)
Dept: OBGYN | Facility: CLINIC | Age: 26
End: 2021-02-22
Payer: MEDICAID

## 2021-02-22 ENCOUNTER — OUTPATIENT (OUTPATIENT)
Dept: OUTPATIENT SERVICES | Facility: HOSPITAL | Age: 26
LOS: 1 days | End: 2021-02-22
Payer: MEDICAID

## 2021-02-22 VITALS
BODY MASS INDEX: 24.11 KG/M2 | OXYGEN SATURATION: 98 % | HEART RATE: 82 BPM | HEIGHT: 66 IN | WEIGHT: 150 LBS | DIASTOLIC BLOOD PRESSURE: 74 MMHG | SYSTOLIC BLOOD PRESSURE: 118 MMHG | RESPIRATION RATE: 18 BRPM | TEMPERATURE: 97.3 F

## 2021-02-22 DIAGNOSIS — Z30.41 ENCOUNTER FOR SURVEILLANCE OF CONTRACEPTIVE PILLS: ICD-10-CM

## 2021-02-22 DIAGNOSIS — Z00.00 ENCOUNTER FOR GENERAL ADULT MEDICAL EXAMINATION WITHOUT ABNORMAL FINDINGS: ICD-10-CM

## 2021-02-22 DIAGNOSIS — Z71.2 PERSON CONSULTING FOR EXPLANATION OF EXAMINATION OR TEST FINDINGS: ICD-10-CM

## 2021-02-22 DIAGNOSIS — R87.612 LOW GRADE SQUAMOUS INTRAEPITHELIAL LESION ON CYTOLOGIC SMEAR OF CERVIX (LGSIL): ICD-10-CM

## 2021-02-22 DIAGNOSIS — Z87.59 PERSONAL HISTORY OF OTHER COMPLICATIONS OF PREGNANCY, CHILDBIRTH AND THE PUERPERIUM: ICD-10-CM

## 2021-02-22 PROCEDURE — G0463: CPT

## 2021-02-22 PROCEDURE — 99214 OFFICE O/P EST MOD 30 MIN: CPT

## 2021-02-22 RX ORDER — DESOGESTREL AND ETHINYL ESTRADIOL 0.15-0.03
0.15-3 KIT ORAL DAILY
Qty: 30 | Refills: 11 | Status: ACTIVE | COMMUNITY
Start: 2020-11-18 | End: 1900-01-01

## 2021-02-24 DIAGNOSIS — Z30.41 ENCOUNTER FOR SURVEILLANCE OF CONTRACEPTIVE PILLS: ICD-10-CM

## 2021-10-22 NOTE — OB RN TRIAGE NOTE - HEART RATE (BEATS/MIN)
[FreeTextEntry1] : \par Pt is here for follow up hypothryoidsm  and h/o PCOS \par \par pt repotrs to be feeling overall ok \par  trying to stay healthy - in\par \par HAs been having occaisonal hot flashes daily but not all day   Pt has been trying to watch diet \par  but BP high today - ate salty  ffods  yesterday \par \par  84

## 2021-12-30 ENCOUNTER — EMERGENCY (EMERGENCY)
Facility: HOSPITAL | Age: 26
LOS: 1 days | Discharge: HOME | End: 2021-12-30
Attending: EMERGENCY MEDICINE
Payer: MEDICAID

## 2021-12-30 VITALS
WEIGHT: 151.9 LBS | TEMPERATURE: 98 F | SYSTOLIC BLOOD PRESSURE: 114 MMHG | HEART RATE: 78 BPM | DIASTOLIC BLOOD PRESSURE: 73 MMHG | RESPIRATION RATE: 18 BRPM | OXYGEN SATURATION: 99 % | HEIGHT: 63 IN

## 2021-12-30 LAB
ALBUMIN SERPL ELPH-MCNC: 3.7 G/DL — SIGNIFICANT CHANGE UP (ref 3.5–5)
ALP SERPL-CCNC: 82 U/L — SIGNIFICANT CHANGE UP (ref 40–120)
ALT FLD-CCNC: 33 U/L DA — SIGNIFICANT CHANGE UP (ref 10–60)
ANION GAP SERPL CALC-SCNC: 6 MMOL/L — SIGNIFICANT CHANGE UP (ref 5–17)
APPEARANCE UR: CLEAR — SIGNIFICANT CHANGE UP
AST SERPL-CCNC: 19 U/L — SIGNIFICANT CHANGE UP (ref 10–40)
BACTERIA # UR AUTO: ABNORMAL /HPF
BASOPHILS # BLD AUTO: 0.02 K/UL — SIGNIFICANT CHANGE UP (ref 0–0.2)
BASOPHILS NFR BLD AUTO: 0.4 % — SIGNIFICANT CHANGE UP (ref 0–2)
BILIRUB SERPL-MCNC: 0.4 MG/DL — SIGNIFICANT CHANGE UP (ref 0.2–1.2)
BILIRUB UR-MCNC: NEGATIVE — SIGNIFICANT CHANGE UP
BLD GP AB SCN SERPL QL: SIGNIFICANT CHANGE UP
BUN SERPL-MCNC: 9 MG/DL — SIGNIFICANT CHANGE UP (ref 7–18)
CALCIUM SERPL-MCNC: 9.3 MG/DL — SIGNIFICANT CHANGE UP (ref 8.4–10.5)
CHLORIDE SERPL-SCNC: 106 MMOL/L — SIGNIFICANT CHANGE UP (ref 96–108)
CO2 SERPL-SCNC: 27 MMOL/L — SIGNIFICANT CHANGE UP (ref 22–31)
COLOR SPEC: YELLOW — SIGNIFICANT CHANGE UP
CREAT SERPL-MCNC: 0.54 MG/DL — SIGNIFICANT CHANGE UP (ref 0.5–1.3)
DIFF PNL FLD: NEGATIVE — SIGNIFICANT CHANGE UP
EOSINOPHIL # BLD AUTO: 0.05 K/UL — SIGNIFICANT CHANGE UP (ref 0–0.5)
EOSINOPHIL NFR BLD AUTO: 0.9 % — SIGNIFICANT CHANGE UP (ref 0–6)
EPI CELLS # UR: SIGNIFICANT CHANGE UP /HPF
GLUCOSE SERPL-MCNC: 91 MG/DL — SIGNIFICANT CHANGE UP (ref 70–99)
GLUCOSE UR QL: NEGATIVE — SIGNIFICANT CHANGE UP
HCG SERPL-ACNC: HIGH MIU/ML
HCT VFR BLD CALC: 39.2 % — SIGNIFICANT CHANGE UP (ref 34.5–45)
HGB BLD-MCNC: 13.7 G/DL — SIGNIFICANT CHANGE UP (ref 11.5–15.5)
IMM GRANULOCYTES NFR BLD AUTO: 0.2 % — SIGNIFICANT CHANGE UP (ref 0–1.5)
KETONES UR-MCNC: NEGATIVE — SIGNIFICANT CHANGE UP
LEUKOCYTE ESTERASE UR-ACNC: NEGATIVE — SIGNIFICANT CHANGE UP
LYMPHOCYTES # BLD AUTO: 1.39 K/UL — SIGNIFICANT CHANGE UP (ref 1–3.3)
LYMPHOCYTES # BLD AUTO: 24.4 % — SIGNIFICANT CHANGE UP (ref 13–44)
MCHC RBC-ENTMCNC: 31.9 PG — SIGNIFICANT CHANGE UP (ref 27–34)
MCHC RBC-ENTMCNC: 34.9 GM/DL — SIGNIFICANT CHANGE UP (ref 32–36)
MCV RBC AUTO: 91.2 FL — SIGNIFICANT CHANGE UP (ref 80–100)
MONOCYTES # BLD AUTO: 0.96 K/UL — HIGH (ref 0–0.9)
MONOCYTES NFR BLD AUTO: 16.9 % — HIGH (ref 2–14)
NEUTROPHILS # BLD AUTO: 3.26 K/UL — SIGNIFICANT CHANGE UP (ref 1.8–7.4)
NEUTROPHILS NFR BLD AUTO: 57.2 % — SIGNIFICANT CHANGE UP (ref 43–77)
NITRITE UR-MCNC: NEGATIVE — SIGNIFICANT CHANGE UP
NRBC # BLD: 0 /100 WBCS — SIGNIFICANT CHANGE UP (ref 0–0)
PH UR: 5 — SIGNIFICANT CHANGE UP (ref 5–8)
PLATELET # BLD AUTO: 190 K/UL — SIGNIFICANT CHANGE UP (ref 150–400)
POTASSIUM SERPL-MCNC: 3.9 MMOL/L — SIGNIFICANT CHANGE UP (ref 3.5–5.3)
POTASSIUM SERPL-SCNC: 3.9 MMOL/L — SIGNIFICANT CHANGE UP (ref 3.5–5.3)
PROT SERPL-MCNC: 7.7 G/DL — SIGNIFICANT CHANGE UP (ref 6–8.3)
PROT UR-MCNC: 15
RBC # BLD: 4.3 M/UL — SIGNIFICANT CHANGE UP (ref 3.8–5.2)
RBC # FLD: 11.1 % — SIGNIFICANT CHANGE UP (ref 10.3–14.5)
RBC CASTS # UR COMP ASSIST: SIGNIFICANT CHANGE UP /HPF (ref 0–2)
SODIUM SERPL-SCNC: 139 MMOL/L — SIGNIFICANT CHANGE UP (ref 135–145)
SP GR SPEC: 1.02 — SIGNIFICANT CHANGE UP (ref 1.01–1.02)
UROBILINOGEN FLD QL: 4
WBC # BLD: 5.82 K/UL — SIGNIFICANT CHANGE UP (ref 3.8–10.5)
WBC # FLD AUTO: 5.82 K/UL — SIGNIFICANT CHANGE UP (ref 3.8–10.5)
WBC UR QL: SIGNIFICANT CHANGE UP /HPF (ref 0–5)

## 2021-12-30 PROCEDURE — 76801 OB US < 14 WKS SINGLE FETUS: CPT

## 2021-12-30 PROCEDURE — 84702 CHORIONIC GONADOTROPIN TEST: CPT

## 2021-12-30 PROCEDURE — 99285 EMERGENCY DEPT VISIT HI MDM: CPT

## 2021-12-30 PROCEDURE — 81001 URINALYSIS AUTO W/SCOPE: CPT

## 2021-12-30 PROCEDURE — 36415 COLL VENOUS BLD VENIPUNCTURE: CPT

## 2021-12-30 PROCEDURE — 86850 RBC ANTIBODY SCREEN: CPT

## 2021-12-30 PROCEDURE — 87086 URINE CULTURE/COLONY COUNT: CPT

## 2021-12-30 PROCEDURE — 80053 COMPREHEN METABOLIC PANEL: CPT

## 2021-12-30 PROCEDURE — 76801 OB US < 14 WKS SINGLE FETUS: CPT | Mod: 26

## 2021-12-30 PROCEDURE — 86901 BLOOD TYPING SEROLOGIC RH(D): CPT

## 2021-12-30 PROCEDURE — 76817 TRANSVAGINAL US OBSTETRIC: CPT | Mod: 26

## 2021-12-30 PROCEDURE — 76817 TRANSVAGINAL US OBSTETRIC: CPT

## 2021-12-30 PROCEDURE — 86900 BLOOD TYPING SEROLOGIC ABO: CPT

## 2021-12-30 PROCEDURE — 85025 COMPLETE CBC W/AUTO DIFF WBC: CPT

## 2021-12-30 PROCEDURE — 99284 EMERGENCY DEPT VISIT MOD MDM: CPT | Mod: 25

## 2021-12-30 NOTE — ED PROVIDER NOTE - OBJECTIVE STATEMENT
[employer] : Olesya [id number]  26 year old  female with LMP on  with PMHx of missed  presenting to the ED with complaints of mild brown-colored vaginal spotting since yesterday. Patient denies any abdominal pain and all other acute complaints. NKDA.

## 2021-12-30 NOTE — ED PROVIDER NOTE - PROGRESS NOTE DETAILS
Workup unremarkable. Rh+. Will dc with OB follow up within 1 week. Pt is well appearing walking with steady gait, stable for discharge and follow up without fail with medical doctor. I had a detailed discussion with the patient and/or guardian regarding the historical points, exam findings, and any diagnostic results supporting the discharge diagnosis. Pt educated on care and need for follow up. Strict return instructions and red flag signs and symptoms discussed with patient. Questions answered. Pt shows understanding of discharge information and agrees to follow.

## 2021-12-30 NOTE — ED PROVIDER NOTE - NSFOLLOWUPCLINICS_GEN_ALL_ED_FT
Oskar PATRICK  [facility]  [address] [place] [address]  Phone: [phone]  Fax: [phone]

## 2021-12-30 NOTE — ED PROVIDER NOTE - ATTENDING CONTRIBUTION TO CARE
26 year old  female with LMP on  with PMHx of missed  presenting to the ED with complaints of mild brown-colored vaginal spotting since yesterday    US reported Single live intrauterine gestation.  Estimated gestational age of 7 weeks 0 days +/- 3 days  Estimated due date of  2022.     Pt stable for f/u with OBGYN.  Return precaution were explained and questions answered.

## 2021-12-30 NOTE — ED PROVIDER NOTE - PATIENT PORTAL LINK FT
You can access the FollowMyHealth Patient Portal offered by [facility] by registering at the following website: [email or web address]. By joining [facility]’s FollowMyHealth portal, you will also be able to view your health information using other applications (apps) compatible with our system.

## 2021-12-30 NOTE — ED PROVIDER NOTE - NSFOLLOWUPINSTRUCTIONS_ED_ALL_ED_FT
Bello un seguimiento con garcia médico OBGYN dentro de 1 semana.    Si experimenta algún síntoma nuevo o que empeora o si está preocupado, siempre puede regresar a la emergencia para rubina reevaluación.    * * *    Follow up with the your OBGYN doctor within 1 week.    If you experience any new or worsening symptoms or if you are concerned you can always come back to the emergency for a re-evaluation.

## 2021-12-30 NOTE — ED ADULT NURSE NOTE - CHIEF COMPLAINT QUOTE
8 weeks pregnant c/o vaginal spotting yesterday no abd pain . I, Tyson Meadows, performed the initial face to face bedside interview with this patient regarding history of present illness, review of symptoms and relevant past medical, social and family history.  I completed an independent physical examination.  I was the initial provider who evaluated this patient. I have signed out the follow up of any pending tests (i.e. labs, radiological studies) to the ACP.  I have communicated the patient’s plan of care and disposition with the ACP.  The history, relevant review of systems, past medical and surgical history, medical decision making, and physical examination was documented by the scribe in my presence and I attest to the accuracy of the documentation.

## 2021-12-30 NOTE — ED PROVIDER NOTE - NSICDXFAMILYHX_GEN_ALL_CORE_FT
FAMILY HISTORY:  Father  Still living? Unknown  FH: heart disease, Age at diagnosis: Age Unknown  FH: type 2 diabetes, Age at diagnosis: Age Unknown

## 2021-12-31 LAB
CULTURE RESULTS: SIGNIFICANT CHANGE UP
SPECIMEN SOURCE: SIGNIFICANT CHANGE UP

## 2022-01-10 ENCOUNTER — APPOINTMENT (OUTPATIENT)
Dept: OBGYN | Facility: CLINIC | Age: 27
End: 2022-01-10

## 2022-03-02 ENCOUNTER — APPOINTMENT (OUTPATIENT)
Dept: OBGYN | Facility: CLINIC | Age: 27
End: 2022-03-02
Payer: MEDICAID

## 2022-03-02 VITALS
DIASTOLIC BLOOD PRESSURE: 71 MMHG | OXYGEN SATURATION: 100 % | HEART RATE: 92 BPM | SYSTOLIC BLOOD PRESSURE: 121 MMHG | WEIGHT: 156 LBS

## 2022-03-02 PROCEDURE — 99214 OFFICE O/P EST MOD 30 MIN: CPT

## 2022-03-04 LAB
C TRACH RRNA SPEC QL NAA+PROBE: NOT DETECTED
HPV HIGH+LOW RISK DNA PNL CVX: NOT DETECTED
N GONORRHOEA RRNA SPEC QL NAA+PROBE: NOT DETECTED
SOURCE TP AMPLIFICATION: NORMAL

## 2022-03-07 ENCOUNTER — LABORATORY RESULT (OUTPATIENT)
Age: 27
End: 2022-03-07

## 2022-03-08 LAB — CYTOLOGY CVX/VAG DOC THIN PREP: NORMAL

## 2022-03-24 ENCOUNTER — ASOB RESULT (OUTPATIENT)
Age: 27
End: 2022-03-24

## 2022-03-24 ENCOUNTER — APPOINTMENT (OUTPATIENT)
Dept: ANTEPARTUM | Facility: CLINIC | Age: 27
End: 2022-03-24
Payer: MEDICAID

## 2022-03-24 PROCEDURE — 76817 TRANSVAGINAL US OBSTETRIC: CPT

## 2022-03-24 PROCEDURE — 76811 OB US DETAILED SNGL FETUS: CPT

## 2022-03-30 ENCOUNTER — NON-APPOINTMENT (OUTPATIENT)
Age: 27
End: 2022-03-30

## 2022-03-30 ENCOUNTER — APPOINTMENT (OUTPATIENT)
Dept: OBGYN | Facility: CLINIC | Age: 27
End: 2022-03-30
Payer: MEDICAID

## 2022-03-30 VITALS
SYSTOLIC BLOOD PRESSURE: 119 MMHG | OXYGEN SATURATION: 99 % | WEIGHT: 160 LBS | HEART RATE: 93 BPM | DIASTOLIC BLOOD PRESSURE: 73 MMHG

## 2022-03-30 PROCEDURE — 0502F SUBSEQUENT PRENATAL CARE: CPT

## 2022-04-20 ENCOUNTER — APPOINTMENT (OUTPATIENT)
Dept: OBGYN | Facility: CLINIC | Age: 27
End: 2022-04-20
Payer: MEDICAID

## 2022-04-20 VITALS
DIASTOLIC BLOOD PRESSURE: 74 MMHG | OXYGEN SATURATION: 98 % | HEART RATE: 94 BPM | WEIGHT: 166 LBS | SYSTOLIC BLOOD PRESSURE: 115 MMHG

## 2022-04-20 PROCEDURE — 0502F SUBSEQUENT PRENATAL CARE: CPT

## 2022-05-02 ENCOUNTER — OUTPATIENT (OUTPATIENT)
Dept: OUTPATIENT SERVICES | Facility: HOSPITAL | Age: 27
LOS: 1 days | End: 2022-05-02
Payer: MEDICAID

## 2022-05-02 DIAGNOSIS — O26.899 OTHER SPECIFIED PREGNANCY RELATED CONDITIONS, UNSPECIFIED TRIMESTER: ICD-10-CM

## 2022-05-02 DIAGNOSIS — Z3A.00 WEEKS OF GESTATION OF PREGNANCY NOT SPECIFIED: ICD-10-CM

## 2022-05-02 PROCEDURE — 87637 SARSCOV2&INF A&B&RSV AMP PRB: CPT

## 2022-05-02 PROCEDURE — 59025 FETAL NON-STRESS TEST: CPT

## 2022-05-02 PROCEDURE — 99283 EMERGENCY DEPT VISIT LOW MDM: CPT

## 2022-05-02 PROCEDURE — 87635 SARS-COV-2 COVID-19 AMP PRB: CPT

## 2022-05-02 PROCEDURE — G0463: CPT

## 2022-05-02 NOTE — ED ADULT NURSE NOTE - NS ED NURSE DISCH DISPOSITION
Caller: DameonAlexandra    Relationship: Self    Best call back number: [phone]    Requested Prescriptions:   Requested Prescriptions     Pending Prescriptions Disp Refills   • atorvastatin (LIPITOR) 40 MG tablet       Sig: Take 1 tablet by mouth Every Night.        Pharmacy where request should be sent: [facility]      Additional details provided by patient: PT STATES THAT [facility] IS REFUSING TO REFILL RX, WAS TOLD TO REACH OUT TO DR. MORENO.  PT HAS 3 DAYS LEFT.    Does the patient have less than a 3 day supply:  [] Yes  [x] No    Anthony De Oliveira Rep   05/02/22 11:32 EDT            Discharged

## 2022-05-05 ENCOUNTER — LABORATORY RESULT (OUTPATIENT)
Age: 27
End: 2022-05-05

## 2022-05-10 ENCOUNTER — APPOINTMENT (OUTPATIENT)
Dept: OBGYN | Facility: CLINIC | Age: 27
End: 2022-05-10
Payer: MEDICAID

## 2022-05-10 VITALS — WEIGHT: 167 LBS | SYSTOLIC BLOOD PRESSURE: 114 MMHG | DIASTOLIC BLOOD PRESSURE: 69 MMHG

## 2022-05-10 PROCEDURE — 0502F SUBSEQUENT PRENATAL CARE: CPT

## 2022-05-31 ENCOUNTER — NON-APPOINTMENT (OUTPATIENT)
Age: 27
End: 2022-05-31

## 2022-05-31 ENCOUNTER — APPOINTMENT (OUTPATIENT)
Dept: OBGYN | Facility: CLINIC | Age: 27
End: 2022-05-31
Payer: MEDICAID

## 2022-05-31 VITALS — WEIGHT: 175 LBS | SYSTOLIC BLOOD PRESSURE: 115 MMHG | DIASTOLIC BLOOD PRESSURE: 70 MMHG

## 2022-05-31 PROCEDURE — 0502F SUBSEQUENT PRENATAL CARE: CPT

## 2022-06-04 NOTE — ED ADULT NURSE NOTE - FINAL NURSING ELECTRONIC SIGNATURE
ENDOCRINE/ DIABETES PROGRESS NOTE    ADMISSION DATE:  5/31/2022  DATE:  6/4/2022  CURRENT HOSPITAL DAY:  Hospital Day: 5  CONSULTING PHYSICIAN:  Ibis Keller MD  ATTENDING PHYSICIAN:  Mirian Marte MD  CODE STATUS:  Do Not Resuscitate    ASSESSMENT & PLAN:      Multiple compression fractures  Rule out osteoporosis   -Postmenopausal patient  -No DEXA scan results per chart review  -Recently completed Prednisone course 10 mg (3/8-3/13)  -PTH rp 6.4 (11/2021)  -CCa 10.3 (5/31)   -Serum Ca 9.7 (6/3)      6/3: Due to recent compression fracture I would not recommend patient to be treated with bisphosphonate or Prolia therapy for at least 6 to 8 weeks, as it will delay the healing process but I recommend adding albumin, PTH, A1c and TSH to daily labs. 6/4:  Calcium   Date Value Ref Range Status   06/04/2022 10.0 8.4 - 10.2 mg/dL Final   06/03/2022 9.7 8.4 - 10.2 mg/dL Final   06/02/2022 9.5 8.4 - 10.2 mg/dL Final     Albumin   Date Value Ref Range Status   06/04/2022 3.3 (L) 3.6 - 5.1 g/dL Final   05/31/2022 3.2 (L) 3.6 - 5.1 g/dL Final   04/14/2022 3.0 (L) 3.6 - 5.1 g/dL Final     PHOSPHORUS   Date Value Ref Range Status   02/27/2014 2.9 2.4 - 4.7 mg/dL Final     Comment:     [facility]  [address] [place] [address], [address] [place] [address]       Phosphorus   Date Value Ref Range Status   03/21/2022 3.0 2.4 - 4.7 mg/dL Final   03/16/2022 4.2 2.4 - 4.7 mg/dL Final     Vitamin D, 25-Hydroxy   Date Value Ref Range Status   06/04/2022 10.8 (L) 30.0 - 100.0 ng/mL Final     Comment:     <20 ng/mL  =  Vitamin D deficiency  20-29 ng/mL  =  Vitamin D insufficiency   ng/mL  =  Optimal Vitamin D  >150 ng/mL  =  Possible toxicity   -Repeat PTH, calc, albumin with morning labs to assess true pth level.  -vitamin D 2k units daily.   -Dexa scan to be don 4-6 weeks to consider an anti resorption treatment.         Hypothyroidism   -Last TSH 1.140 (11/2021)  -Takes LT4 125 mcg at home      6/3: Continue home LT4. Recommend to recheck TFTs with daily labs tomorrow. 6/4:  Lab Results   Component Value Date    TSH 41.100 (H) 06/04/2022    TSH 1.140 11/14/2021     Lab Results   Component Value Date    T4FREE 0.6 (L) 06/04/2022    FT3 0.8 (L) 06/04/2022   -increase LT4 137 mcg from 125 mcg . Repeat tfts in 6-8weeks         #Squamous cell lung cancer  Followed up by oncology service     #COPD  Oxygen dependent    INTERVAL HISTORY:      Dennis Haines is a 81year old female patient admitted with Acute midline thoracic back pain [M54.6]. Events over last 24 hours:  NAEO. Report pain is consistent. Patient's current diet is Regular Diet. Blood sugar review:  Last 24 hours blood sugars ranged No results available in last 24 hours    MEDICATIONS:      The medication list was reviewed today.      MEDICATIONS  Current Facility-Administered Medications   Medication Dose Route Frequency Provider Last Rate Last Admin    cholecalciferol (VITAMIN D) tablet 50 mcg  50 mcg Oral Daily Jason Guerra MD        ondansetron (ZOFRAN) injection 4 mg  4 mg Intravenous 4x Daily PRN Malena Lisa MD   4 mg at 06/02/22 1352    cyclobenzaprine (FLEXERIL) tablet 5 mg  5 mg Oral Q8H [address] Bel, CNP   5 mg at 06/04/22 0054    docusate sodium-sennosides (SENOKOT S) 50-8.6 MG 2 tablet  2 tablet Oral Nightly [address] Bel, CNP   2 tablet at 06/03/22 2146    polyethylene glycol (MIRALAX) packet 17 g  17 g Oral Daily [address] Bel, CNP   17 g at 06/04/22 0845    ALPRAZolam (XANAX) tablet 0.5 mg  0.5 mg Oral Nightly PRN Malena Lisa MD   0.5 mg at 06/03/22 2144    fluticasone-umeclidin-vilanterol (TRELEGY ELLIPTA) 100-62.5-25 MCG/INH inhaler 1 puff  1 puff Inhalation Daily Resp Malena Lisa MD   1 puff at 06/04/22 0850    levothyroxine (SYNTHROID, LEVOTHROID) tablet 125 mcg  125 mcg Oral QAM AC Malena Lisa MD   125 mcg at 06/04/22 0500    pantoprazole (PROTONIX) EC tablet 40 mg  40 mg Oral Nightly Malena Lisa MD 40 mg at 06/03/22 2146    polyethylene glycol (MIRALAX) packet 17 g  17 g Oral Daily PRN Masoud Mittal MD        atorvastatin (LIPITOR) tablet 20 mg  20 mg Oral Nightly Masoud Mittal MD   20 mg at 06/03/22 2146    sodium chloride 0.9 % flush bag 25 mL  25 mL Intravenous PRN Masoud Mittal MD        sodium chloride (PF) 0.9 % injection 2 mL  2 mL Intracatheter 2 times per day Masoud Mittal MD   2 mL at 06/04/22 0846    [Held by provider] heparin (porcine) injection 5,000 Units  5,000 Units Subcutaneous 3 times per day Masoud Mittal MD   5,000 Units at 06/02/22 1323    morphine injection 2 mg  2 mg Intravenous Q3H PRN Isma Franco MD   2 mg at 06/02/22 2351    albuterol inhaler 2 puff  2 puff Inhalation Q4H Resp PRN Masoud Mittal MD   2 puff at 06/02/22 0212    acetaminophen (TYLENOL) tablet 650 mg  650 mg Oral Q4H PRN Masoud Mittal MD   650 mg at 06/04/22 1242    traMADol (ULTRAM) tablet 50 mg  50 mg Oral Q4H PRN Masoud Mittal MD   50 mg at 06/04/22 1242       OBJECTIVE:      VITAL SIGNS:     Vital Last Value 24 Hour Range   Temperature 97.7 Â°F (36.5 Â°C) (06/04/22 0840) Temp  Min: 97.5 Â°F (36.4 Â°C)  Max: 98.1 Â°F (36.7 Â°C)   Pulse 95 (06/04/22 1237) Pulse  Min: 87  Max: 98   Respiratory 18 (06/04/22 1237) Resp  Min: 12  Max: 20   Non-Invasive  Blood Pressure 119/75 (06/04/22 0840) BP  Min: 119/75  Max: 165/95   Pulse Oximetry 94 % (06/04/22 1237) SpO2  Min: 89 %  Max: 98 %     Vital Today Admitted   Weight 72.5 kg (159 lb 13.3 oz) (06/01/22 0600) Weight: 72.5 kg (159 lb 13.3 oz) (06/01/22 0600)   Height N/A     BMI N/A       INTAKE/OUTPUT:      Intake/Output Summary (Last 24 hours) at 6/4/2022 1319  Last data filed at 6/4/2022 0800  Gross per 24 hour   Intake 741.86 ml   Output 250 ml   Net 491.86 ml             PHYSICAL EXAM:    GA: NAD, well appearing   Head and Neck: Normocephalic atraumatic  Moist mucous membranes  ENT: EOM intact, no scleral icterus  Heart RRR  Resp: Non labored breathing, no wheezing, No resp distress   Neuro: Alert, awake, conversive,  no focal deficits   Skin warm no palor, no rashes   Psych: appropriate mood and affect, cooperative      LABORATORY DATA:      Recent Results (from the past 24 hour(s))   Prothrombin Time    Collection Time: 06/03/22  5:28 PM   Result Value Ref Range    Prothrombin Time 10.9 9.7 - 11.8 sec    INR 1.0     Parathyroid Hormone Intact Without Calcium    Collection Time: 06/03/22  5:28 PM   Result Value Ref Range    PTH, Intact 54 19 - 88 pg/mL   Basic Metabolic Panel    Collection Time: 06/04/22  6:09 AM   Result Value Ref Range    Fasting Status      Sodium 134 (L) 135 - 145 mmol/L    Potassium 4.4 3.4 - 5.1 mmol/L    Chloride 98 97 - 110 mmol/L    Carbon Dioxide 35 (H) 21 - 32 mmol/L    Anion Gap 5 (L) 7 - 19 mmol/L    Glucose 81 70 - 99 mg/dL    BUN 11 6 - 20 mg/dL    Creatinine 0.73 0.51 - 0.95 mg/dL    Glomerular Filtration Rate 82 >=60    BUN/ Creatinine Ratio 15 7 - 25    Calcium 10.0 8.4 - 10.2 mg/dL   Thyroid Stimulating Hormone    Collection Time: 06/04/22  6:09 AM   Result Value Ref Range    TSH 41.100 (H) 0.350 - 5.000 mcUnits/mL   Free T4    Collection Time: 06/04/22  6:09 AM   Result Value Ref Range    T4, Free 0.6 (L) 0.8 - 1.5 ng/dL   Free T3    Collection Time: 06/04/22  6:09 AM   Result Value Ref Range    T3, Free 0.8 (L) 2.2 - 4.0 pg/mL   Glycohemoglobin    Collection Time: 06/04/22  6:09 AM   Result Value Ref Range    Hemoglobin A1C 5.3 4.5 - 5.6 %   CBC with Automated Differential (performable only)    Collection Time: 06/04/22  6:09 AM   Result Value Ref Range    WBC 4.4 4.2 - 11.0 K/mcL    RBC 3.99 (L) 4.00 - 5.20 mil/mcL    HGB 11.7 (L) 12.0 - 15.5 g/dL    HCT 37.8 36.0 - 46.5 %    MCV 94.7 78.0 - 100.0 fl    MCH 29.3 26.0 - 34.0 pg    MCHC 31.0 (L) 32.0 - 36.5 g/dL    RDW-CV 15.0 11.0 - 15.0 %    RDW-SD 52.6 (H) 39.0 - 50.0 fL     140 - 450 K/mcL    NRBC 0 <=0 /100 WBC    Neutrophil, Percent 75 %    Lymphocytes, Percent 15 %    Mono, Percent 8 %    Eosinophils, Percent 1 %    Basophils, Percent 1 %    Immature Granulocytes 0 %    Absolute Neutrophils 3.3 1.8 - 7.7 K/mcL    Absolute Lymphocytes 0.7 (L) 1.0 - 4.0 K/mcL    Absolute Monocytes 0.3 0.3 - 0.9 K/mcL    Absolute Eosinophils  0.1 0.0 - 0.5 K/mcL    Absolute Basophils 0.0 0.0 - 0.3 K/mcL    Absolute Immmature Granulocytes 0.0 0.0 - 0.2 K/mcL   Vitamin D -25 Hydroxy    Collection Time: 06/04/22  6:09 AM   Result Value Ref Range    Vitamin D, 25-Hydroxy 10.8 (L) 30.0 - 100.0 ng/mL   Albumin    Collection Time: 06/04/22  6:09 AM   Result Value Ref Range    Albumin 3.3 (L) 3.6 - 5.1 g/dL       Hemoglobin A1C (%)   Date Value   06/04/2022 5.3       Creatinine (mg/dL)   Date Value   06/04/2022 0.73   06/03/2022 0.68   06/02/2022 0.59       No results found for: TYSTIMHOR, FREET3, FREET4, [id number], WIL, PTH        The Diabetes Tests flowsheet was reviewed. IMAGING  reviewed                            Attending physician attestation:  The pt has been seen, examine and D/W PGY 4 resident and I agree with the History, Physical Examination and A/P and note. Thank you for allowing me to participate in the care of the patient and for consultation. If any of the questions are unanswered please don't hesitate to give me a call. 30-Dec-2021 16:28

## 2022-06-16 ENCOUNTER — ASOB RESULT (OUTPATIENT)
Age: 27
End: 2022-06-16

## 2022-06-16 ENCOUNTER — APPOINTMENT (OUTPATIENT)
Dept: ANTEPARTUM | Facility: CLINIC | Age: 27
End: 2022-06-16
Payer: MEDICAID

## 2022-06-16 PROCEDURE — 76816 OB US FOLLOW-UP PER FETUS: CPT

## 2022-06-17 ENCOUNTER — LABORATORY RESULT (OUTPATIENT)
Age: 27
End: 2022-06-17

## 2022-06-17 ENCOUNTER — APPOINTMENT (OUTPATIENT)
Dept: OBGYN | Facility: CLINIC | Age: 27
End: 2022-06-17
Payer: MEDICAID

## 2022-06-17 VITALS
DIASTOLIC BLOOD PRESSURE: 77 MMHG | HEART RATE: 94 BPM | OXYGEN SATURATION: 99 % | WEIGHT: 181 LBS | SYSTOLIC BLOOD PRESSURE: 116 MMHG

## 2022-06-17 PROCEDURE — 0502F SUBSEQUENT PRENATAL CARE: CPT

## 2022-06-28 ENCOUNTER — NON-APPOINTMENT (OUTPATIENT)
Age: 27
End: 2022-06-28

## 2022-07-01 ENCOUNTER — APPOINTMENT (OUTPATIENT)
Dept: OBGYN | Facility: CLINIC | Age: 27
End: 2022-07-01

## 2022-07-01 VITALS
HEART RATE: 105 BPM | OXYGEN SATURATION: 99 % | SYSTOLIC BLOOD PRESSURE: 118 MMHG | WEIGHT: 184 LBS | DIASTOLIC BLOOD PRESSURE: 80 MMHG

## 2022-07-01 PROCEDURE — 0502F SUBSEQUENT PRENATAL CARE: CPT

## 2022-07-14 ENCOUNTER — LABORATORY RESULT (OUTPATIENT)
Age: 27
End: 2022-07-14

## 2022-07-15 ENCOUNTER — APPOINTMENT (OUTPATIENT)
Dept: OBGYN | Facility: CLINIC | Age: 27
End: 2022-07-15

## 2022-07-15 VITALS
HEART RATE: 91 BPM | WEIGHT: 187 LBS | SYSTOLIC BLOOD PRESSURE: 122 MMHG | OXYGEN SATURATION: 100 % | DIASTOLIC BLOOD PRESSURE: 82 MMHG

## 2022-07-15 PROCEDURE — 0502F SUBSEQUENT PRENATAL CARE: CPT

## 2022-07-20 ENCOUNTER — NON-APPOINTMENT (OUTPATIENT)
Age: 27
End: 2022-07-20

## 2022-07-24 ENCOUNTER — INPATIENT (INPATIENT)
Facility: HOSPITAL | Age: 27
LOS: 1 days | Discharge: HOME | End: 2022-07-26
Attending: OBSTETRICS & GYNECOLOGY | Admitting: OBSTETRICS & GYNECOLOGY
Payer: MEDICAID

## 2022-07-24 VITALS
TEMPERATURE: 100 F | SYSTOLIC BLOOD PRESSURE: 117 MMHG | DIASTOLIC BLOOD PRESSURE: 58 MMHG | RESPIRATION RATE: 18 BRPM | OXYGEN SATURATION: 97 % | HEART RATE: 99 BPM

## 2022-07-24 DIAGNOSIS — Z3A.00 WEEKS OF GESTATION OF PREGNANCY NOT SPECIFIED: ICD-10-CM

## 2022-07-24 DIAGNOSIS — Z34.80 ENCOUNTER FOR SUPERVISION OF OTHER NORMAL PREGNANCY, UNSPECIFIED TRIMESTER: ICD-10-CM

## 2022-07-24 DIAGNOSIS — O26.899 OTHER SPECIFIED PREGNANCY RELATED CONDITIONS, UNSPECIFIED TRIMESTER: ICD-10-CM

## 2022-07-24 LAB
ALLERGY+IMMUNOLOGY DIAG STUDY NOTE: SIGNIFICANT CHANGE UP
APTT BLD: 26.9 SEC — LOW (ref 27.5–35.5)
BASOPHILS # BLD AUTO: 0.03 K/UL — SIGNIFICANT CHANGE UP (ref 0–0.2)
BASOPHILS NFR BLD AUTO: 0.2 % — SIGNIFICANT CHANGE UP (ref 0–2)
BLD GP AB SCN SERPL QL: SIGNIFICANT CHANGE UP
EOSINOPHIL # BLD AUTO: 0.06 K/UL — SIGNIFICANT CHANGE UP (ref 0–0.5)
EOSINOPHIL NFR BLD AUTO: 0.5 % — SIGNIFICANT CHANGE UP (ref 0–6)
FIBRINOGEN PPP-MCNC: 864 MG/DL — HIGH (ref 340–550)
HCT VFR BLD CALC: 42.6 % — SIGNIFICANT CHANGE UP (ref 34.5–45)
HGB BLD-MCNC: 14.7 G/DL — SIGNIFICANT CHANGE UP (ref 11.5–15.5)
IMM GRANULOCYTES NFR BLD AUTO: 0.6 % — SIGNIFICANT CHANGE UP (ref 0–1.5)
INR BLD: 0.92 RATIO — SIGNIFICANT CHANGE UP (ref 0.88–1.16)
LYMPHOCYTES # BLD AUTO: 18.2 % — SIGNIFICANT CHANGE UP (ref 13–44)
LYMPHOCYTES # BLD AUTO: 2.27 K/UL — SIGNIFICANT CHANGE UP (ref 1–3.3)
MCHC RBC-ENTMCNC: 32.6 PG — SIGNIFICANT CHANGE UP (ref 27–34)
MCHC RBC-ENTMCNC: 34.5 GM/DL — SIGNIFICANT CHANGE UP (ref 32–36)
MCV RBC AUTO: 94.5 FL — SIGNIFICANT CHANGE UP (ref 80–100)
MONOCYTES # BLD AUTO: 0.92 K/UL — HIGH (ref 0–0.9)
MONOCYTES NFR BLD AUTO: 7.4 % — SIGNIFICANT CHANGE UP (ref 2–14)
NEUTROPHILS # BLD AUTO: 9.12 K/UL — HIGH (ref 1.8–7.4)
NEUTROPHILS NFR BLD AUTO: 73.1 % — SIGNIFICANT CHANGE UP (ref 43–77)
NRBC # BLD: 0 /100 WBCS — SIGNIFICANT CHANGE UP (ref 0–0)
PLATELET # BLD AUTO: 302 K/UL — SIGNIFICANT CHANGE UP (ref 150–400)
PROTHROM AB SERPL-ACNC: 10.9 SEC — SIGNIFICANT CHANGE UP (ref 10.5–13.4)
RBC # BLD: 4.51 M/UL — SIGNIFICANT CHANGE UP (ref 3.8–5.2)
RBC # FLD: 11.9 % — SIGNIFICANT CHANGE UP (ref 10.3–14.5)
SARS-COV-2 RNA SPEC QL NAA+PROBE: SIGNIFICANT CHANGE UP
T PALLIDUM AB TITR SER: NEGATIVE — SIGNIFICANT CHANGE UP
WBC # BLD: 12.47 K/UL — HIGH (ref 3.8–10.5)
WBC # FLD AUTO: 12.47 K/UL — HIGH (ref 3.8–10.5)

## 2022-07-24 RX ORDER — BENZOCAINE 10 %
1 GEL (GRAM) MUCOUS MEMBRANE EVERY 6 HOURS
Refills: 0 | Status: DISCONTINUED | OUTPATIENT
Start: 2022-07-24 | End: 2022-07-26

## 2022-07-24 RX ORDER — AER TRAVELER 0.5 G/1
1 SOLUTION RECTAL; TOPICAL EVERY 4 HOURS
Refills: 0 | Status: DISCONTINUED | OUTPATIENT
Start: 2022-07-24 | End: 2022-07-26

## 2022-07-24 RX ORDER — TETANUS TOXOID, REDUCED DIPHTHERIA TOXOID AND ACELLULAR PERTUSSIS VACCINE, ADSORBED 5; 2.5; 8; 8; 2.5 [IU]/.5ML; [IU]/.5ML; UG/.5ML; UG/.5ML; UG/.5ML
0.5 SUSPENSION INTRAMUSCULAR ONCE
Refills: 0 | Status: DISCONTINUED | OUTPATIENT
Start: 2022-07-24 | End: 2022-07-26

## 2022-07-24 RX ORDER — AMPICILLIN TRIHYDRATE 250 MG
2 CAPSULE ORAL ONCE
Refills: 0 | Status: COMPLETED | OUTPATIENT
Start: 2022-07-24 | End: 2022-07-24

## 2022-07-24 RX ORDER — DIPHENHYDRAMINE HCL 50 MG
25 CAPSULE ORAL EVERY 6 HOURS
Refills: 0 | Status: DISCONTINUED | OUTPATIENT
Start: 2022-07-24 | End: 2022-07-26

## 2022-07-24 RX ORDER — DIBUCAINE 1 %
1 OINTMENT (GRAM) RECTAL EVERY 6 HOURS
Refills: 0 | Status: DISCONTINUED | OUTPATIENT
Start: 2022-07-24 | End: 2022-07-26

## 2022-07-24 RX ORDER — PRAMOXINE HYDROCHLORIDE 150 MG/15G
1 AEROSOL, FOAM RECTAL EVERY 4 HOURS
Refills: 0 | Status: DISCONTINUED | OUTPATIENT
Start: 2022-07-24 | End: 2022-07-26

## 2022-07-24 RX ORDER — SODIUM CHLORIDE 9 MG/ML
1000 INJECTION, SOLUTION INTRAVENOUS
Refills: 0 | Status: DISCONTINUED | OUTPATIENT
Start: 2022-07-24 | End: 2022-07-24

## 2022-07-24 RX ORDER — AMPICILLIN TRIHYDRATE 250 MG
1 CAPSULE ORAL EVERY 4 HOURS
Refills: 0 | Status: DISCONTINUED | OUTPATIENT
Start: 2022-07-24 | End: 2022-07-24

## 2022-07-24 RX ORDER — LANOLIN
1 OINTMENT (GRAM) TOPICAL EVERY 6 HOURS
Refills: 0 | Status: DISCONTINUED | OUTPATIENT
Start: 2022-07-24 | End: 2022-07-26

## 2022-07-24 RX ORDER — HYDROCORTISONE 1 %
1 OINTMENT (GRAM) TOPICAL EVERY 6 HOURS
Refills: 0 | Status: DISCONTINUED | OUTPATIENT
Start: 2022-07-24 | End: 2022-07-26

## 2022-07-24 RX ORDER — CITRIC ACID/SODIUM CITRATE 300-500 MG
15 SOLUTION, ORAL ORAL EVERY 6 HOURS
Refills: 0 | Status: DISCONTINUED | OUTPATIENT
Start: 2022-07-24 | End: 2022-07-24

## 2022-07-24 RX ORDER — OXYTOCIN 10 UNIT/ML
2 VIAL (ML) INJECTION
Qty: 30 | Refills: 0 | Status: DISCONTINUED | OUTPATIENT
Start: 2022-07-24 | End: 2022-07-26

## 2022-07-24 RX ORDER — OXYCODONE HYDROCHLORIDE 5 MG/1
5 TABLET ORAL ONCE
Refills: 0 | Status: DISCONTINUED | OUTPATIENT
Start: 2022-07-24 | End: 2022-07-26

## 2022-07-24 RX ORDER — FERROUS SULFATE 325(65) MG
325 TABLET ORAL DAILY
Refills: 0 | Status: DISCONTINUED | OUTPATIENT
Start: 2022-07-24 | End: 2022-07-25

## 2022-07-24 RX ORDER — OXYTOCIN 10 UNIT/ML
333.33 VIAL (ML) INJECTION
Qty: 20 | Refills: 0 | Status: DISCONTINUED | OUTPATIENT
Start: 2022-07-24 | End: 2022-07-26

## 2022-07-24 RX ORDER — ACETAMINOPHEN 500 MG
975 TABLET ORAL
Refills: 0 | Status: DISCONTINUED | OUTPATIENT
Start: 2022-07-24 | End: 2022-07-26

## 2022-07-24 RX ORDER — IBUPROFEN 200 MG
600 TABLET ORAL EVERY 6 HOURS
Refills: 0 | Status: COMPLETED | OUTPATIENT
Start: 2022-07-24 | End: 2023-06-22

## 2022-07-24 RX ORDER — MAGNESIUM HYDROXIDE 400 MG/1
30 TABLET, CHEWABLE ORAL
Refills: 0 | Status: DISCONTINUED | OUTPATIENT
Start: 2022-07-24 | End: 2022-07-26

## 2022-07-24 RX ORDER — KETOROLAC TROMETHAMINE 30 MG/ML
30 SYRINGE (ML) INJECTION ONCE
Refills: 0 | Status: DISCONTINUED | OUTPATIENT
Start: 2022-07-24 | End: 2022-07-26

## 2022-07-24 RX ORDER — SIMETHICONE 80 MG/1
80 TABLET, CHEWABLE ORAL EVERY 4 HOURS
Refills: 0 | Status: DISCONTINUED | OUTPATIENT
Start: 2022-07-24 | End: 2022-07-26

## 2022-07-24 RX ORDER — SENNA PLUS 8.6 MG/1
2 TABLET ORAL AT BEDTIME
Refills: 0 | Status: DISCONTINUED | OUTPATIENT
Start: 2022-07-24 | End: 2022-07-25

## 2022-07-24 RX ORDER — SODIUM CHLORIDE 9 MG/ML
3 INJECTION INTRAMUSCULAR; INTRAVENOUS; SUBCUTANEOUS EVERY 8 HOURS
Refills: 0 | Status: DISCONTINUED | OUTPATIENT
Start: 2022-07-24 | End: 2022-07-26

## 2022-07-24 RX ADMIN — Medication 108 GRAM(S): at 09:54

## 2022-07-24 RX ADMIN — Medication 2 MILLIUNIT(S)/MIN: at 13:05

## 2022-07-24 RX ADMIN — SODIUM CHLORIDE 125 MILLILITER(S): 9 INJECTION, SOLUTION INTRAVENOUS at 02:05

## 2022-07-24 RX ADMIN — Medication 216 GRAM(S): at 02:30

## 2022-07-24 RX ADMIN — Medication 108 GRAM(S): at 06:30

## 2022-07-24 RX ADMIN — Medication 108 GRAM(S): at 21:59

## 2022-07-24 RX ADMIN — Medication 108 GRAM(S): at 14:30

## 2022-07-24 RX ADMIN — Medication 108 GRAM(S): at 18:05

## 2022-07-24 NOTE — PATIENT PROFILE OB - FALL HARM RISK - UNIVERSAL INTERVENTIONS
Bed in lowest position, wheels locked, appropriate side rails in place/Call bell, personal items and telephone in reach/Instruct patient to call for assistance before getting out of bed or chair/Non-slip footwear when patient is out of bed/[place] to call system/Physically safe environment - no spills, clutter or unnecessary equipment/Purposeful Proactive Rounding/Room/bathroom lighting operational, light cord in reach

## 2022-07-25 ENCOUNTER — TRANSCRIPTION ENCOUNTER (OUTPATIENT)
Age: 27
End: 2022-07-25

## 2022-07-25 ENCOUNTER — APPOINTMENT (OUTPATIENT)
Dept: OBGYN | Facility: CLINIC | Age: 27
End: 2022-07-25

## 2022-07-25 LAB
BASOPHILS # BLD AUTO: 0.02 K/UL — SIGNIFICANT CHANGE UP (ref 0–0.2)
BASOPHILS NFR BLD AUTO: 0.2 % — SIGNIFICANT CHANGE UP (ref 0–2)
EOSINOPHIL # BLD AUTO: 0.1 K/UL — SIGNIFICANT CHANGE UP (ref 0–0.5)
EOSINOPHIL NFR BLD AUTO: 1 % — SIGNIFICANT CHANGE UP (ref 0–6)
HCT VFR BLD CALC: 39.1 % — SIGNIFICANT CHANGE UP (ref 34.5–45)
HGB BLD-MCNC: 12.9 G/DL — SIGNIFICANT CHANGE UP (ref 11.5–15.5)
IMM GRANULOCYTES NFR BLD AUTO: 0.3 % — SIGNIFICANT CHANGE UP (ref 0–1.5)
LYMPHOCYTES # BLD AUTO: 1.84 K/UL — SIGNIFICANT CHANGE UP (ref 1–3.3)
LYMPHOCYTES # BLD AUTO: 18.5 % — SIGNIFICANT CHANGE UP (ref 13–44)
MCHC RBC-ENTMCNC: 33 GM/DL — SIGNIFICANT CHANGE UP (ref 32–36)
MCHC RBC-ENTMCNC: 33.1 PG — SIGNIFICANT CHANGE UP (ref 27–34)
MCV RBC AUTO: 100.3 FL — HIGH (ref 80–100)
MONOCYTES # BLD AUTO: 1 K/UL — HIGH (ref 0–0.9)
MONOCYTES NFR BLD AUTO: 10.1 % — SIGNIFICANT CHANGE UP (ref 2–14)
NEUTROPHILS # BLD AUTO: 6.96 K/UL — SIGNIFICANT CHANGE UP (ref 1.8–7.4)
NEUTROPHILS NFR BLD AUTO: 69.9 % — SIGNIFICANT CHANGE UP (ref 43–77)
NRBC # BLD: 0 /100 WBCS — SIGNIFICANT CHANGE UP (ref 0–0)
PLATELET # BLD AUTO: 172 K/UL — SIGNIFICANT CHANGE UP (ref 150–400)
RBC # BLD: 3.9 M/UL — SIGNIFICANT CHANGE UP (ref 3.8–5.2)
RBC # FLD: 12.2 % — SIGNIFICANT CHANGE UP (ref 10.3–14.5)
WBC # BLD: 9.95 K/UL — SIGNIFICANT CHANGE UP (ref 3.8–10.5)
WBC # FLD AUTO: 9.95 K/UL — SIGNIFICANT CHANGE UP (ref 3.8–10.5)

## 2022-07-25 RX ORDER — IBUPROFEN 200 MG
600 TABLET ORAL EVERY 6 HOURS
Refills: 0 | Status: DISCONTINUED | OUTPATIENT
Start: 2022-07-25 | End: 2022-07-26

## 2022-07-25 RX ADMIN — SODIUM CHLORIDE 3 MILLILITER(S): 9 INJECTION INTRAMUSCULAR; INTRAVENOUS; SUBCUTANEOUS at 14:22

## 2022-07-25 RX ADMIN — Medication 1000 MILLIUNIT(S)/MIN: at 00:11

## 2022-07-25 RX ADMIN — Medication 600 MILLIGRAM(S): at 13:00

## 2022-07-25 RX ADMIN — Medication 600 MILLIGRAM(S): at 12:36

## 2022-07-25 RX ADMIN — Medication 600 MILLIGRAM(S): at 05:29

## 2022-07-25 RX ADMIN — SODIUM CHLORIDE 3 MILLILITER(S): 9 INJECTION INTRAMUSCULAR; INTRAVENOUS; SUBCUTANEOUS at 05:00

## 2022-07-25 RX ADMIN — Medication 975 MILLIGRAM(S): at 20:53

## 2022-07-25 RX ADMIN — Medication 600 MILLIGRAM(S): at 07:10

## 2022-07-25 RX ADMIN — SODIUM CHLORIDE 3 MILLILITER(S): 9 INJECTION INTRAMUSCULAR; INTRAVENOUS; SUBCUTANEOUS at 22:27

## 2022-07-25 RX ADMIN — Medication 1 TABLET(S): at 12:36

## 2022-07-25 RX ADMIN — Medication 975 MILLIGRAM(S): at 21:50

## 2022-07-25 RX ADMIN — Medication 1 SPRAY(S): at 04:49

## 2022-07-25 NOTE — DISCHARGE NOTE OB - CARE PROVIDER_API CALL
Luis Nash  OBSTETRICS AND GYNECOLOGY  [address] [facility], [place] [address]  Phone: [phone]  Fax: [phone]  Follow Up Time:

## 2022-07-25 NOTE — DISCHARGE NOTE OB - CARE PROVIDERS DIRECT ADDRESSES
[email or web address]

## 2022-07-25 NOTE — DISCHARGE NOTE OB - MATERIALS PROVIDED
Vaccinations/  Immunization Record/Guide to Postpartum Care/[facility] Hearing Screen Program/Back To Sleep Handout/Shaken Baby Prevention Handout/Breastfeeding Guide and Packet/Birth Certificate Instructions/Discharge Medication Information for Patients and Families Pocket Guide/Prescription for Breast Pump

## 2022-07-25 NOTE — DISCHARGE NOTE OB - CARE PLAN
1 Principal Discharge DX:	 (normal spontaneous vaginal delivery)  Assessment and plan of treatment:	Pelvic rest for 5-6weeks, no sex, no tampons. Avoid heavy lifting, no strenuous activities. Motrin as needed for pain. Regular diet as tolerated. Follow up in office 5-6 weeks.

## 2022-07-25 NOTE — DISCHARGE NOTE OB - MEDICATION SUMMARY - MEDICATIONS TO TAKE
yes
I will START or STAY ON the medications listed below when I get home from the hospital:    ibuprofen 600 mg oral tablet  -- 1 tab(s) by mouth every 6 hours  -- Do not take this drug if you are pregnant.  It is very important that you take or use this exactly as directed.  Do not skip doses or discontinue unless directed by your doctor.  May cause drowsiness or dizziness.  Obtain medical advice before taking any non-prescription drugs as some may affect the action of this medication.  Take with food or milk.    -- Indication: For pain    Colace 100 mg oral capsule  -- 1 cap(s) by mouth 2 times a day  -- Medication should be taken with plenty of water.    -- Indication: For constipation

## 2022-07-25 NOTE — DISCHARGE NOTE OB - PATIENT PORTAL LINK FT
You can access the FollowMyHealth Patient Portal offered by [facility] by registering at the following website: [email or web address]. By joining [employer]’s FollowMyHealth portal, you will also be able to view your health information using other applications (apps) compatible with our system.

## 2022-07-26 VITALS
RESPIRATION RATE: 17 BRPM | TEMPERATURE: 98 F | SYSTOLIC BLOOD PRESSURE: 108 MMHG | HEART RATE: 77 BPM | OXYGEN SATURATION: 98 % | DIASTOLIC BLOOD PRESSURE: 73 MMHG

## 2022-07-26 RX ORDER — IBUPROFEN 200 MG
3 TABLET ORAL
Qty: 0 | Refills: 0 | DISCHARGE

## 2022-07-26 RX ORDER — IBUPROFEN 200 MG
1 TABLET ORAL
Qty: 120 | Refills: 0
Start: 2022-07-26 | End: 2022-08-24

## 2022-07-26 RX ORDER — DOCUSATE SODIUM 100 MG
1 CAPSULE ORAL
Qty: 60 | Refills: 0
Start: 2022-07-26 | End: 2022-08-24

## 2022-07-26 RX ORDER — ACETAMINOPHEN 500 MG
2 TABLET ORAL
Qty: 0 | Refills: 0 | DISCHARGE

## 2022-07-26 RX ADMIN — Medication 975 MILLIGRAM(S): at 03:47

## 2022-07-26 RX ADMIN — Medication 975 MILLIGRAM(S): at 04:40

## 2022-07-26 RX ADMIN — Medication 600 MILLIGRAM(S): at 01:00

## 2022-07-26 RX ADMIN — Medication 0.5 MILLILITER(S): at 03:48

## 2022-07-26 RX ADMIN — SODIUM CHLORIDE 3 MILLILITER(S): 9 INJECTION INTRAMUSCULAR; INTRAVENOUS; SUBCUTANEOUS at 06:17

## 2022-07-26 RX ADMIN — Medication 600 MILLIGRAM(S): at 00:09

## 2022-07-26 NOTE — LACTATION INITIAL EVALUATION - AS DELIV COMPLICATIONS OB
none/premature rupture of membranes prior to labor
none/premature rupture of membranes prior to labor

## 2022-07-26 NOTE — PROGRESS NOTE ADULT - SUBJECTIVE AND OBJECTIVE BOX
Patient seen at bedside resting comfortably offers no current complaints.  Ambulating and voiding without difficulty.  Passing flatus and tolerating regular diet.  both breast/bottle feeding.  Denies HA, CP, SOB, N/V/D, dizziness, palpitations, worsening abdominal pain, worsening vaginal bleeding, or any other concerns.    Vital Signs Last 24 Hrs  T(C): 36.8 (2022 05:35), Max: 36.8 (2022 05:35)  T(F): 98.3 (2022 05:35), Max: 98.3 (2022 05:35)  HR: 77 (2022 05:35) (76 - 77)  BP: 108/73 (2022 05:35) (108/73 - 114/74)  BP(mean): --  RR: 17 (2022 05:35) (17 - 18)  SpO2: 98% (2022 05:35) (98% - 98%)    Parameters below as of 2022 05:35  Patient On (Oxygen Delivery Method): room air        Physical Exam:     Gen: A&Ox 3, NAD  Chest: CTA B/L  Cardiac: S1+S2; RRR  Breast: Soft, nontender, nonengorged  Abdomen: +BS; Soft, nontender, ND; Fundus firm below umbilicus  Gyn: Min lochia  Ext: Nontender, DTRS 2+, no worsening edema                           12.9   9.95  )-----------( 172      ( 2022 21:35 )             39.1                           A/P: 27 year old PPD#2 s/p       - Discharge home with instructions  -Follow up in office in 5-6 weeks for postpartum visit  -Breastfeeding encouraged   -d/w dr. Nash
Patient evaluated at bedside, offers no acute complaints.  Resting comfortably with baby at bedside.  Tolerating regular diet, Voiding without difficulty; +flatus, -BM  Currently breast and bottlefeeding.  Denies fever/chills, nausea/vomiting, headache, dizziness, chest pains, shortness of breath, palpitations    Vital Signs Last 24 Hrs  T(C): 36.8 (25 Jul 2022 05:30), Max: 37.7 (24 Jul 2022 23:48)  T(F): 98.3 (25 Jul 2022 05:30), Max: 99.9 (24 Jul 2022 23:48)  HR: 87 (25 Jul 2022 05:30) (70 - 100)  BP: 110/71 (25 Jul 2022 05:30) (106/58 - 117/67)  BP(mean): 84 (25 Jul 2022 05:30) (77 - 84)  RR: 18 (25 Jul 2022 05:30) (16 - 20)  SpO2: 98% (25 Jul 2022 05:30) (96% - 98%)    Parameters below as of 25 Jul 2022 05:30  Patient On (Oxygen Delivery Method): room air    PE: Pt appears comfortable, NAD, AAOx3  Chest: CTA bilaterally, no W/R/R  Cardiac: RRR  Breasts: soft, NT/nonengorged bilaterally; no masses, no erythema  Abd: soft; NT; no guarding or rebound; +BS x4 quad; Fundus firm NT below umbilicus  Gyn: moderate lochia  Ext: soft, NT; DTRs 2+ bilaterally; no worsening edema                          14.7   12.47 )-----------( 302      ( 24 Jul 2022 02:50 )             42.6     Dr. Nash aware

## 2022-07-26 NOTE — PROGRESS NOTE ADULT - PROBLEM SELECTOR PLAN 1
Pain management  continue breastfeeding  routine postpartum care  cbc 6pm
- Discharge home with instructions  -Follow up in office in 5-6 weeks for postpartum visit  -Breastfeeding encouraged   -d/w dr. Nash

## 2022-07-26 NOTE — LACTATION INITIAL EVALUATION - INTERVENTION OUTCOME
verbalizes understanding/demonstrates understanding of teaching/good return demonstration/Lactation team to follow up
[facility] Tele-lactation/verbalizes understanding/demonstrates understanding of teaching/needs met

## 2022-07-26 NOTE — LACTATION INITIAL EVALUATION - ADDITIONAL HEALTH HISTORY COMMENTS
SAB with D&C X1, ectopic pregnancy X1; s/p Metroplasty, 2021 (repair of irregularly shaped uterus)
SAB with D&C X1, ectopic pregnancy X1; s/p Metroplasty, 2021 (repair of irregularly shaped uterus)

## 2022-07-26 NOTE — LACTATION INITIAL EVALUATION - LACTATION INTERVENTIONS
baby is (+) josefina; pt. with flat nipples that narinder however, remain short and baby with difficulty latching/suckling; provided with nipple shield and explained risks/benefits, proper use and cleaning; baby was able to latch utilizing shield with few suckles; pt. Supplements with formula per pt's choice and baby (+) josefina/initiate/review safe skin-to-skin/initiate/review hand expression/initiate/review techniques for position and latch/initiate/review nipple shield use/review techniques to increase milk supply/reviewed components of an effective feeding and at least 8 effective feedings per day required/reviewed importance of monitoring infant diapers, the breastfeeding log, and minimum output each day/reviewed benefits and recommendations for rooming in/reviewed feeding on demand/by cue at least 8 times a day
Reinforced benefits of  exclusive breastfeeding for first 6 months and provided with breastfeeding community resource list for breastfeeding support/assistance available post-discharge and of importance of early f/u with pediatrician within 2-3 days.  Referred to [facility] Tele-lactation program for breastfeeding support and f/u post-discharge in community/initiate/review safe skin-to-skin/initiate/review hand expression/initiate/review techniques for position and latch/post discharge community resources provided/initiate/review nipple shield use/review techniques to manage sore nipples/engorgement/reviewed components of an effective feeding and at least 8 effective feedings per day required/reviewed importance of monitoring infant diapers, the breastfeeding log, and minimum output each day/reviewed risks of unnecessary formula supplementation/reviewed benefits and recommendations for rooming in/reviewed feeding on demand/by cue at least 8 times a day/reviewed indications of inadequate milk transfer that would require supplementation

## 2022-08-11 PROCEDURE — G0463: CPT

## 2022-08-11 PROCEDURE — 59025 FETAL NON-STRESS TEST: CPT

## 2022-08-11 PROCEDURE — 86780 TREPONEMA PALLIDUM: CPT

## 2022-08-11 PROCEDURE — 36415 COLL VENOUS BLD VENIPUNCTURE: CPT

## 2022-08-11 PROCEDURE — 85730 THROMBOPLASTIN TIME PARTIAL: CPT

## 2022-08-11 PROCEDURE — 85610 PROTHROMBIN TIME: CPT

## 2022-08-11 PROCEDURE — 86850 RBC ANTIBODY SCREEN: CPT

## 2022-08-11 PROCEDURE — 86901 BLOOD TYPING SEROLOGIC RH(D): CPT

## 2022-08-11 PROCEDURE — 87635 SARS-COV-2 COVID-19 AMP PRB: CPT

## 2022-08-11 PROCEDURE — 90707 MMR VACCINE SC: CPT

## 2022-08-11 PROCEDURE — 86922 COMPATIBILITY TEST ANTIGLOB: CPT

## 2022-08-11 PROCEDURE — 85025 COMPLETE CBC W/AUTO DIFF WBC: CPT

## 2022-08-11 PROCEDURE — 85384 FIBRINOGEN ACTIVITY: CPT

## 2022-08-11 PROCEDURE — 86870 RBC ANTIBODY IDENTIFICATION: CPT

## 2022-08-11 PROCEDURE — 59050 FETAL MONITOR W/REPORT: CPT

## 2022-08-11 PROCEDURE — 86880 COOMBS TEST DIRECT: CPT

## 2022-08-11 PROCEDURE — 86900 BLOOD TYPING SEROLOGIC ABO: CPT

## 2022-09-02 ENCOUNTER — APPOINTMENT (OUTPATIENT)
Dept: OBGYN | Facility: CLINIC | Age: 27
End: 2022-09-02

## 2022-09-02 VITALS
DIASTOLIC BLOOD PRESSURE: 80 MMHG | WEIGHT: 162 LBS | OXYGEN SATURATION: 98 % | SYSTOLIC BLOOD PRESSURE: 117 MMHG | HEART RATE: 66 BPM

## 2022-09-02 PROCEDURE — 0503F POSTPARTUM CARE VISIT: CPT

## 2022-10-01 ENCOUNTER — EMERGENCY (EMERGENCY)
Facility: HOSPITAL | Age: 27
LOS: 1 days | Discharge: HOME | End: 2022-10-01
Attending: STUDENT IN AN ORGANIZED HEALTH CARE EDUCATION/TRAINING PROGRAM
Payer: MEDICAID

## 2022-10-01 VITALS
RESPIRATION RATE: 18 BRPM | HEART RATE: 89 BPM | WEIGHT: 153 LBS | TEMPERATURE: 98 F | DIASTOLIC BLOOD PRESSURE: 68 MMHG | OXYGEN SATURATION: 99 % | SYSTOLIC BLOOD PRESSURE: 109 MMHG | HEIGHT: 63 IN

## 2022-10-01 LAB
B PERT DNA SPEC QL NAA+PROBE: SIGNIFICANT CHANGE UP
C PNEUM DNA SPEC QL NAA+PROBE: SIGNIFICANT CHANGE UP
FLUAV H1 2009 PAND RNA SPEC QL NAA+PROBE: SIGNIFICANT CHANGE UP
FLUAV H1 RNA SPEC QL NAA+PROBE: SIGNIFICANT CHANGE UP
FLUAV H3 RNA SPEC QL NAA+PROBE: SIGNIFICANT CHANGE UP
FLUAV SUBTYP SPEC NAA+PROBE: SIGNIFICANT CHANGE UP
FLUBV RNA SPEC QL NAA+PROBE: SIGNIFICANT CHANGE UP
HADV DNA SPEC QL NAA+PROBE: SIGNIFICANT CHANGE UP
HCOV PNL SPEC NAA+PROBE: SIGNIFICANT CHANGE UP
HMPV RNA SPEC QL NAA+PROBE: SIGNIFICANT CHANGE UP
HPIV1 RNA SPEC QL NAA+PROBE: SIGNIFICANT CHANGE UP
HPIV2 RNA SPEC QL NAA+PROBE: SIGNIFICANT CHANGE UP
HPIV3 RNA SPEC QL NAA+PROBE: SIGNIFICANT CHANGE UP
HPIV4 RNA SPEC QL NAA+PROBE: SIGNIFICANT CHANGE UP
RAPID RVP RESULT: SIGNIFICANT CHANGE UP
RSV RNA SPEC QL NAA+PROBE: SIGNIFICANT CHANGE UP
RV+EV RNA SPEC QL NAA+PROBE: SIGNIFICANT CHANGE UP
SARS-COV-2 RNA SPEC QL NAA+PROBE: SIGNIFICANT CHANGE UP

## 2022-10-01 PROCEDURE — 0225U NFCT DS DNA&RNA 21 SARSCOV2: CPT

## 2022-10-01 PROCEDURE — 99284 EMERGENCY DEPT VISIT MOD MDM: CPT

## 2022-10-01 PROCEDURE — 99283 EMERGENCY DEPT VISIT LOW MDM: CPT

## 2022-10-01 RX ORDER — ACETAMINOPHEN 500 MG
975 TABLET ORAL ONCE
Refills: 0 | Status: COMPLETED | OUTPATIENT
Start: 2022-10-01 | End: 2022-10-01

## 2022-10-01 RX ADMIN — Medication 975 MILLIGRAM(S): at 10:30

## 2022-10-01 RX ADMIN — Medication 975 MILLIGRAM(S): at 11:00

## 2022-10-01 NOTE — ED PROVIDER NOTE - CLINICAL SUMMARY MEDICAL DECISION MAKING FREE TEXT BOX
Judith: 27 year old female with no pertinent PMH presents with flu-like symptoms x 3 days. Pt reports fever Tmax 101F associated with nasal congestion, sore throat, dry cough, body aches, bilateral headache, and nausea. Pt also reports intermittent bilateral breast discomfort x months since giving birth, states she has not followed up with her OBGYN. Denies any redness or discharge. Denies any chest pain, shortness of breath, abdominal pain, vomiting, diarrhea, bloody stools, black tarry stools, dysuria, vision change, numbness, weakness, or rash. Likely viral syndrome as pt with URI symptoms and fevers. Pt well appearing, vital signs non-actionable. Will provide symptomatic treatment, labs eval for viral etiology, likely DC with PMD/GYN follow up with return precautions.

## 2022-10-01 NOTE — ED PROVIDER NOTE - PHYSICAL EXAMINATION
CONSTITUTIONAL: non-toxic, well appearing  SKIN: no rash, no petechiae.  EYES: PERRL, EOMI, pink conjunctiva, anicteric  ENT: tongue and uvular midline, no exudates, moist mucous membranes  NECK: Supple; no meningismus, no JVD  CARD: RRR, no murmurs, equal radial pulses bilaterally 2+  RESP: CTAB, no respiratory distress  ABD: Soft, non-tender, non-distended, no peritoneal signs, no CVA tenderness  EXT: Normal ROM x4. No edema. No calf tenderness  NEURO: Alert, oriented. Neuro exam nonfocal, equal strength bilaterally  PSYCH: Cooperative, appropriate.

## 2022-10-01 NOTE — ED PROVIDER NOTE - OBJECTIVE STATEMENT
#[id number]    27 year old female with no pertinent PMH presents with flu-like symptoms x 3 days. Pt reports fever Tmax 101F associated with nasal congestion, sore throat, dry cough, body aches, bilateral headache, and nausea. Pt also reports intermittent bilateral breast discomfort x months since giving birth, states she has not followed up with her OBGYN. Denies any redness or discharge. Denies any chest pain, shortness of breath, abdominal pain, vomiting, diarrhea, bloody stools, black tarry stools, dysuria, vision change, numbness, weakness, or rash. Denies any additional complaints.

## 2022-10-01 NOTE — ED PROVIDER NOTE - NSFOLLOWUPINSTRUCTIONS_ED_ALL_ED_FT
You were seen and evaluated in the Emergency Department for your viral upper respiratory symptoms, possibly COVID. You should self-quarantine for presumed COVID. Please see the attached COVID information packet for management of your symptoms, and more information on the next steps including your self-quarantine measures. Remaining self-quarantined is crucial to limiting the spread of the virus.  You may also refer to the CDC website for more information: https://www.cdc.gov/coronavirus/2019-ncov/if-you-are-sick/steps-when-sick.html.      - Do NOT go to work, school or public areas.  Avoid using public transportation, ride sharing or taxis.  - Wear a mask whenever you are around other people.  - Avoid sharing personal household items.  You should NOT share dishes, drinking glasses, cups, eating utensils, towels or bedding with other people or pets in your home.  After using these items they should be washed thoroughly.    - Avoid touching your face including your eyes, nose and mouth with your hands.  - Wash your hands often with soap and water for at least 20 seconds.  You can also clean your hands with an alcohol based  that contains at least 60-95% alcohol.  You should wash/clean all surfaces of your hands.  Use soap and water preferentially.    At this time your clinical evaluation and history do not demonstrate any acute, life-threatening medical conditions warranting emergent treatment.     Continue to maintain oral hydration with plenty of fluids.  You may take Tylenol (Acetaminophen) every 8 hours with food (following the instructions on the medication insert information sheet for dosing) for fever control.  Do not exceed 3000 mg in 24 hours of acetaminophen (Tylenol).      Take over the counter ibuprofen 400-600 mg every 6 hours with food as needed for pain.  Do not take these medications if you do not have pain or if you have any history of bleeding disorder or, kidney disease. Do not use ibuprofen if you are on blood thinners (anti-coagulation).      Should you develop new or worsening symptoms including but not limited to chest pain, shortness of breath, abdominal pain, vomiting, diarrhea - please return to the ED for immediate evaluation.

## 2022-10-01 NOTE — ED PROVIDER NOTE - NS ED ROS FT
Review of Systems    Constitutional: (+) fever, (+) chills, (+) fatigue  HEENT: (+) sore throat, (-) hearing loss, (-) nasal congestion  Cardiovascular: (-) chest pain, (-) syncope  Respiratory: (+) cough, (-) shortness of breath  Gastrointestinal: (-) vomiting, (-) diarrhea, (-) abdominal pain  Musculoskeletal: (-) neck pain, (-) back pain  Integumentary: (-) rash, (-) edema, (-) wound  Neurological: (+) headache, (-) altered mental status    Except as documented in the HPI, all other systems are negative.

## 2022-10-01 NOTE — ED PROVIDER NOTE - PATIENT PORTAL LINK FT
You can access the FollowMyHealth Patient Portal offered by [facility] by registering at the following website: [email or web address]. By joining [employer]’s FollowMyHealth portal, you will also be able to view your health information using other applications (apps) compatible with our system.

## 2022-10-06 ENCOUNTER — APPOINTMENT (OUTPATIENT)
Dept: OBGYN | Facility: CLINIC | Age: 27
End: 2022-10-06

## 2022-10-06 VITALS
OXYGEN SATURATION: 99 % | HEART RATE: 88 BPM | WEIGHT: 154 LBS | DIASTOLIC BLOOD PRESSURE: 71 MMHG | SYSTOLIC BLOOD PRESSURE: 105 MMHG

## 2022-10-06 DIAGNOSIS — N64.59 OTHER SIGNS AND SYMPTOMS IN BREAST: ICD-10-CM

## 2022-10-06 PROCEDURE — 99213 OFFICE O/P EST LOW 20 MIN: CPT

## 2022-10-06 NOTE — HISTORY OF PRESENT ILLNESS
[FreeTextEntry1] : here because she felt her breast engorge and warm \par she is currently breastfeeding \par denies fever \par

## 2022-11-29 NOTE — H&P PST ADULT - VENOUS THROMBOEMBOLISM FOR WOMEN ONLY
-- DO NOT REPLY / DO NOT REPLY ALL --  -- Message is from Engagement Center Operations ([facility]) --    General Patient Message patient mother would like a call back to schedule patient flu and booster as soon as possible, was not able to schedule no availablity    Caller Information       Type Contact Phone/Fax    11/29/2022 03:12 PM CST Phone (Incoming) DYAN JACQUES (Emergency Contact) [phone] (M)        Alternative phone number: [phone]    Can a detailed message be left? Yes    Message Turnaround:     Is it Working Hours? Yes - Working Hours     [place]:    Please give this turnaround time to the caller:   \"This message will be sent to [state Provider's name]. The clinical team will fulfill your request as soon as they review your message.\"                
Mom is calling to schedule a nurse visit for a covid booster and flu appointment made.  
(0) indicator not present

## 2023-03-06 ENCOUNTER — APPOINTMENT (OUTPATIENT)
Dept: OBGYN | Facility: CLINIC | Age: 28
End: 2023-03-06
Payer: MEDICAID

## 2023-03-06 VITALS
HEART RATE: 78 BPM | OXYGEN SATURATION: 98 % | DIASTOLIC BLOOD PRESSURE: 65 MMHG | SYSTOLIC BLOOD PRESSURE: 104 MMHG | WEIGHT: 153 LBS

## 2023-03-06 DIAGNOSIS — Z01.419 ENCOUNTER FOR GYNECOLOGICAL EXAMINATION (GENERAL) (ROUTINE) W/OUT ABNORMAL FINDINGS: ICD-10-CM

## 2023-03-06 PROCEDURE — 99395 PREV VISIT EST AGE 18-39: CPT

## 2023-03-06 NOTE — HISTORY OF PRESENT ILLNESS
[FreeTextEntry1] : here for annual\par no complaints \par still breastfeeding \par on POP\par has menses today

## 2023-10-31 RX ORDER — NORETHINDRONE 0.35 MG/1
0.35 TABLET ORAL DAILY
Qty: 3 | Refills: 3 | Status: ACTIVE | COMMUNITY
Start: 2022-09-02 | End: 1900-01-01

## 2024-07-12 ENCOUNTER — LABORATORY RESULT (OUTPATIENT)
Age: 29
End: 2024-07-12

## 2024-07-12 ENCOUNTER — APPOINTMENT (OUTPATIENT)
Dept: OBGYN | Facility: CLINIC | Age: 29
End: 2024-07-12
Payer: COMMERCIAL

## 2024-07-12 VITALS — DIASTOLIC BLOOD PRESSURE: 71 MMHG | SYSTOLIC BLOOD PRESSURE: 112 MMHG | WEIGHT: 162 LBS

## 2024-07-12 DIAGNOSIS — Z34.91 ENCOUNTER FOR SUPERVISION OF NORMAL PREGNANCY, UNSPECIFIED, FIRST TRIMESTER: ICD-10-CM

## 2024-07-12 PROCEDURE — 0500F INITIAL PRENATAL CARE VISIT: CPT

## 2024-07-12 RX ORDER — .BETA.-CAROTENE, ASCORBIC ACID, CHOLECALCIFEROL, .ALPHA.-TOCOPHEROL ACETATE, DL-, THIAMINE MONONITRATE, RIBOFLAVIN, NIACINAMIDE, PYRIDOXINE HYDROCHLORIDE, FOLIC ACID, CYANOCOBALAMIN, CALCIUM PANTOTHENATE, CALCIUM CARBONATE, FERROUS FUMARATE, ZINC OXIDE, AND DOCUSATE SODIUM 1000; 100; 400; 30; 3; 3; 15; 20; 1; 12; 7; 200; 29; 20; 25 [IU]/1; MG/1; [IU]/1; [IU]/1; MG/1; MG/1; MG/1; MG/1; MG/1; UG/1; MG/1; MG/1; MG/1; MG/1; MG/1
29-1 TABLET, COATED ORAL DAILY
Qty: 30 | Refills: 11 | Status: ACTIVE | COMMUNITY
Start: 2024-07-12 | End: 1900-01-01

## 2024-07-23 ENCOUNTER — NON-APPOINTMENT (OUTPATIENT)
Age: 29
End: 2024-07-23

## 2024-08-01 ENCOUNTER — LABORATORY RESULT (OUTPATIENT)
Age: 29
End: 2024-08-01

## 2024-08-09 ENCOUNTER — NON-APPOINTMENT (OUTPATIENT)
Age: 29
End: 2024-08-09

## 2024-08-15 ENCOUNTER — NON-APPOINTMENT (OUTPATIENT)
Age: 29
End: 2024-08-15

## 2024-08-15 ENCOUNTER — APPOINTMENT (OUTPATIENT)
Dept: ANTEPARTUM | Facility: CLINIC | Age: 29
End: 2024-08-15

## 2024-08-15 PROCEDURE — 76801 OB US < 14 WKS SINGLE FETUS: CPT

## 2024-08-15 PROCEDURE — 76813 OB US NUCHAL MEAS 1 GEST: CPT | Mod: 59

## 2024-08-16 ENCOUNTER — APPOINTMENT (OUTPATIENT)
Dept: OBGYN | Facility: CLINIC | Age: 29
End: 2024-08-16
Payer: COMMERCIAL

## 2024-08-16 VITALS
DIASTOLIC BLOOD PRESSURE: 71 MMHG | WEIGHT: 162 LBS | HEART RATE: 77 BPM | OXYGEN SATURATION: 100 % | SYSTOLIC BLOOD PRESSURE: 109 MMHG

## 2024-08-16 PROCEDURE — 99213 OFFICE O/P EST LOW 20 MIN: CPT

## 2024-08-22 ENCOUNTER — APPOINTMENT (OUTPATIENT)
Dept: MATERNAL FETAL MEDICINE | Facility: CLINIC | Age: 29
End: 2024-08-22

## 2024-08-22 ENCOUNTER — LABORATORY RESULT (OUTPATIENT)
Age: 29
End: 2024-08-22

## 2024-08-22 ENCOUNTER — ASOB RESULT (OUTPATIENT)
Age: 29
End: 2024-08-22

## 2024-08-22 DIAGNOSIS — O26.20 PREGNANCY CARE FOR PATIENT WITH RECURRENT PREGNANCY LOSS, UNSPECIFIED TRIMESTER: ICD-10-CM

## 2024-08-26 LAB
B2 GLYCOPROT1 IGG SER-ACNC: <1.4 U/ML
B2 GLYCOPROT1 IGM SER-ACNC: <1.5 U/ML
CARDIOLIPIN IGM SER-MCNC: 6.7 MPL U/ML
CARDIOLIPIN IGM SER-MCNC: <1.6 GPL U/ML

## 2024-08-29 ENCOUNTER — APPOINTMENT (OUTPATIENT)
Dept: ANTEPARTUM | Facility: CLINIC | Age: 29
End: 2024-08-29

## 2024-08-29 ENCOUNTER — APPOINTMENT (OUTPATIENT)
Dept: MATERNAL FETAL MEDICINE | Facility: CLINIC | Age: 29
End: 2024-08-29

## 2024-08-29 VITALS
BODY MASS INDEX: 26.58 KG/M2 | HEIGHT: 63 IN | WEIGHT: 150 LBS | SYSTOLIC BLOOD PRESSURE: 115 MMHG | DIASTOLIC BLOOD PRESSURE: 66 MMHG | HEART RATE: 86 BPM

## 2024-08-29 PROCEDURE — 99204 OFFICE O/P NEW MOD 45 MIN: CPT

## 2024-09-06 ENCOUNTER — NON-APPOINTMENT (OUTPATIENT)
Age: 29
End: 2024-09-06

## 2024-09-10 ENCOUNTER — NON-APPOINTMENT (OUTPATIENT)
Age: 29
End: 2024-09-10

## 2024-09-10 NOTE — ED ADULT TRIAGE NOTE - HEIGHT IN CM
Left  for pt regarding her appt. Tomorrow with Dr. Jaye Saenz.  Advised that her appt needs to be rescheduled due to an emergency and staff would call to reschedule.    160.02

## 2024-09-13 ENCOUNTER — APPOINTMENT (OUTPATIENT)
Dept: OBGYN | Facility: CLINIC | Age: 29
End: 2024-09-13
Payer: COMMERCIAL

## 2024-09-13 VITALS
HEART RATE: 87 BPM | OXYGEN SATURATION: 100 % | SYSTOLIC BLOOD PRESSURE: 101 MMHG | BODY MASS INDEX: 28.7 KG/M2 | WEIGHT: 162 LBS | DIASTOLIC BLOOD PRESSURE: 64 MMHG

## 2024-09-13 DIAGNOSIS — O21.9 VOMITING OF PREGNANCY, UNSPECIFIED: ICD-10-CM

## 2024-09-13 PROCEDURE — 99213 OFFICE O/P EST LOW 20 MIN: CPT

## 2024-09-13 RX ORDER — METOCLOPRAMIDE 5 MG/1
5 TABLET ORAL 3 TIMES DAILY
Qty: 45 | Refills: 1 | Status: ACTIVE | COMMUNITY
Start: 2024-09-13 | End: 1900-01-01

## 2024-09-19 ENCOUNTER — LABORATORY RESULT (OUTPATIENT)
Age: 29
End: 2024-09-19

## 2024-10-04 ENCOUNTER — NON-APPOINTMENT (OUTPATIENT)
Age: 29
End: 2024-10-04

## 2024-10-10 ENCOUNTER — ASOB RESULT (OUTPATIENT)
Age: 29
End: 2024-10-10

## 2024-10-10 ENCOUNTER — APPOINTMENT (OUTPATIENT)
Dept: ANTEPARTUM | Facility: CLINIC | Age: 29
End: 2024-10-10
Payer: COMMERCIAL

## 2024-10-10 DIAGNOSIS — O09.92 SUPERVISION OF HIGH RISK PREGNANCY, UNSPECIFIED, SECOND TRIMESTER: ICD-10-CM

## 2024-10-10 DIAGNOSIS — N88.3 INCOMPETENCE OF CERVIX UTERI: ICD-10-CM

## 2024-10-10 PROCEDURE — 99214 OFFICE O/P EST MOD 30 MIN: CPT | Mod: 25

## 2024-10-10 PROCEDURE — 76817 TRANSVAGINAL US OBSTETRIC: CPT

## 2024-10-10 PROCEDURE — 76805 OB US >/= 14 WKS SNGL FETUS: CPT

## 2024-10-10 RX ORDER — PROGESTERONE 200 MG/1
200 CAPSULE ORAL
Qty: 30 | Refills: 3 | Status: ACTIVE | COMMUNITY
Start: 2024-10-10 | End: 1900-01-01

## 2024-10-10 RX ORDER — PROGESTERONE 200 MG/1
200 CAPSULE ORAL
Qty: 30 | Refills: 3 | Status: DISCONTINUED | COMMUNITY
Start: 2024-10-10 | End: 2024-10-10

## 2024-10-11 ENCOUNTER — APPOINTMENT (OUTPATIENT)
Dept: OBGYN | Facility: CLINIC | Age: 29
End: 2024-10-11
Payer: COMMERCIAL

## 2024-10-11 ENCOUNTER — NON-APPOINTMENT (OUTPATIENT)
Age: 29
End: 2024-10-11

## 2024-10-11 VITALS
BODY MASS INDEX: 30.11 KG/M2 | HEART RATE: 90 BPM | DIASTOLIC BLOOD PRESSURE: 79 MMHG | WEIGHT: 170 LBS | SYSTOLIC BLOOD PRESSURE: 125 MMHG | OXYGEN SATURATION: 100 %

## 2024-10-11 PROCEDURE — 99213 OFFICE O/P EST LOW 20 MIN: CPT

## 2024-10-15 NOTE — ED ADULT NURSE NOTE - MODE OF DISCHARGE
Patient presented to the athletic training room to start his RTP protocol. He completed day 1 yesterday (10/14) without issue. He will progress to day 2 today. He will follow up tomorrow.    Ambulatory

## 2024-10-17 ENCOUNTER — OUTPATIENT (OUTPATIENT)
Dept: OUTPATIENT SERVICES | Facility: HOSPITAL | Age: 29
LOS: 1 days | End: 2024-10-17
Payer: MEDICAID

## 2024-10-17 VITALS
DIASTOLIC BLOOD PRESSURE: 80 MMHG | TEMPERATURE: 98 F | RESPIRATION RATE: 18 BRPM | SYSTOLIC BLOOD PRESSURE: 132 MMHG | OXYGEN SATURATION: 98 % | HEART RATE: 82 BPM

## 2024-10-17 DIAGNOSIS — O26.899 OTHER SPECIFIED PREGNANCY RELATED CONDITIONS, UNSPECIFIED TRIMESTER: ICD-10-CM

## 2024-10-17 PROBLEM — O02.1 MISSED ABORTION: Chronic | Status: INACTIVE | Noted: 2020-03-05 | Resolved: 2024-10-17

## 2024-10-17 LAB
ALBUMIN SERPL ELPH-MCNC: 3.2 G/DL — LOW (ref 3.5–5)
ALP SERPL-CCNC: 89 U/L — SIGNIFICANT CHANGE UP (ref 40–120)
ALT FLD-CCNC: 27 U/L DA — SIGNIFICANT CHANGE UP (ref 10–60)
ANION GAP SERPL CALC-SCNC: 5 MMOL/L — SIGNIFICANT CHANGE UP (ref 5–17)
APPEARANCE UR: ABNORMAL
AST SERPL-CCNC: 16 U/L — SIGNIFICANT CHANGE UP (ref 10–40)
BACTERIA # UR AUTO: NEGATIVE /HPF — SIGNIFICANT CHANGE UP
BASOPHILS # BLD AUTO: 0.02 K/UL — SIGNIFICANT CHANGE UP (ref 0–0.2)
BASOPHILS NFR BLD AUTO: 0.2 % — SIGNIFICANT CHANGE UP (ref 0–2)
BILIRUB SERPL-MCNC: 0.4 MG/DL — SIGNIFICANT CHANGE UP (ref 0.2–1.2)
BILIRUB UR-MCNC: NEGATIVE — SIGNIFICANT CHANGE UP
BUN SERPL-MCNC: 8 MG/DL — SIGNIFICANT CHANGE UP (ref 7–18)
CALCIUM SERPL-MCNC: 9.4 MG/DL — SIGNIFICANT CHANGE UP (ref 8.4–10.5)
CHLORIDE SERPL-SCNC: 107 MMOL/L — SIGNIFICANT CHANGE UP (ref 96–108)
CO2 SERPL-SCNC: 26 MMOL/L — SIGNIFICANT CHANGE UP (ref 22–31)
COLOR SPEC: YELLOW — SIGNIFICANT CHANGE UP
CREAT SERPL-MCNC: 0.49 MG/DL — LOW (ref 0.5–1.3)
DIFF PNL FLD: ABNORMAL
EGFR: 132 ML/MIN/1.73M2 — SIGNIFICANT CHANGE UP
EOSINOPHIL # BLD AUTO: 0.15 K/UL — SIGNIFICANT CHANGE UP (ref 0–0.5)
EOSINOPHIL NFR BLD AUTO: 1.3 % — SIGNIFICANT CHANGE UP (ref 0–6)
EPI CELLS # UR: PRESENT
GLUCOSE SERPL-MCNC: 101 MG/DL — HIGH (ref 70–99)
GLUCOSE UR QL: NEGATIVE MG/DL — SIGNIFICANT CHANGE UP
HCT VFR BLD CALC: 39.2 % — SIGNIFICANT CHANGE UP (ref 34.5–45)
HGB BLD-MCNC: 13.2 G/DL — SIGNIFICANT CHANGE UP (ref 11.5–15.5)
IMM GRANULOCYTES NFR BLD AUTO: 0.4 % — SIGNIFICANT CHANGE UP (ref 0–0.9)
KETONES UR-MCNC: NEGATIVE MG/DL — SIGNIFICANT CHANGE UP
LEUKOCYTE ESTERASE UR-ACNC: ABNORMAL
LYMPHOCYTES # BLD AUTO: 1.98 K/UL — SIGNIFICANT CHANGE UP (ref 1–3.3)
LYMPHOCYTES # BLD AUTO: 17.7 % — SIGNIFICANT CHANGE UP (ref 13–44)
MCHC RBC-ENTMCNC: 32.3 PG — SIGNIFICANT CHANGE UP (ref 27–34)
MCHC RBC-ENTMCNC: 33.7 GM/DL — SIGNIFICANT CHANGE UP (ref 32–36)
MCV RBC AUTO: 95.8 FL — SIGNIFICANT CHANGE UP (ref 80–100)
MONOCYTES # BLD AUTO: 0.72 K/UL — SIGNIFICANT CHANGE UP (ref 0–0.9)
MONOCYTES NFR BLD AUTO: 6.4 % — SIGNIFICANT CHANGE UP (ref 2–14)
NEUTROPHILS # BLD AUTO: 8.26 K/UL — HIGH (ref 1.8–7.4)
NEUTROPHILS NFR BLD AUTO: 74 % — SIGNIFICANT CHANGE UP (ref 43–77)
NITRITE UR-MCNC: NEGATIVE — SIGNIFICANT CHANGE UP
NRBC # BLD: 0 /100 WBCS — SIGNIFICANT CHANGE UP (ref 0–0)
PH UR: 7 — SIGNIFICANT CHANGE UP (ref 5–8)
PLATELET # BLD AUTO: 217 K/UL — SIGNIFICANT CHANGE UP (ref 150–400)
POTASSIUM SERPL-MCNC: 4 MMOL/L — SIGNIFICANT CHANGE UP (ref 3.5–5.3)
POTASSIUM SERPL-SCNC: 4 MMOL/L — SIGNIFICANT CHANGE UP (ref 3.5–5.3)
PROT SERPL-MCNC: 7.4 G/DL — SIGNIFICANT CHANGE UP (ref 6–8.3)
PROT UR-MCNC: NEGATIVE MG/DL — SIGNIFICANT CHANGE UP
RBC # BLD: 4.09 M/UL — SIGNIFICANT CHANGE UP (ref 3.8–5.2)
RBC # FLD: 11.8 % — SIGNIFICANT CHANGE UP (ref 10.3–14.5)
RBC CASTS # UR COMP ASSIST: 8 /HPF — HIGH (ref 0–4)
SODIUM SERPL-SCNC: 138 MMOL/L — SIGNIFICANT CHANGE UP (ref 135–145)
SP GR SPEC: 1.02 — SIGNIFICANT CHANGE UP (ref 1–1.03)
UROBILINOGEN FLD QL: 1 MG/DL — SIGNIFICANT CHANGE UP (ref 0.2–1)
WBC # BLD: 11.17 K/UL — HIGH (ref 3.8–10.5)
WBC # FLD AUTO: 11.17 K/UL — HIGH (ref 3.8–10.5)
WBC UR QL: 1 /HPF — SIGNIFICANT CHANGE UP (ref 0–5)

## 2024-10-17 PROCEDURE — 81001 URINALYSIS AUTO W/SCOPE: CPT

## 2024-10-17 PROCEDURE — 80053 COMPREHEN METABOLIC PANEL: CPT

## 2024-10-17 PROCEDURE — 85025 COMPLETE CBC W/AUTO DIFF WBC: CPT

## 2024-10-17 PROCEDURE — 36415 COLL VENOUS BLD VENIPUNCTURE: CPT

## 2024-10-17 PROCEDURE — G0463: CPT

## 2024-10-17 PROCEDURE — 99213 OFFICE O/P EST LOW 20 MIN: CPT

## 2024-10-17 NOTE — OB PROVIDER TRIAGE NOTE - HISTORY OF PRESENT ILLNESS
30 y/o  at [id number] EYNY 2025 LMP 2023 presents from ED for RUQ pain that radiates to right side for the past three days. States that pain is aggravated by food. Reports occasional urinary urgency. Reports +fetal movement, denies VB, ctx, LOF, N/V, fever, chills, SOB, chest pain, itchiness. Pt on progesterone for short cervix as per MFM.     PNC with Dr. Cavazos    OBHx:   1) SAB with D&C at 16wks, 2022  2) SAB with pills at 4wks, 10/2022  3) , 2023, 6lb 16 oz, on Progesterone for first 3 months    GYNHx: Reports no hx of STIs, fibroids or cysts. PAP LGSIL    SurgHx: Reports no other surgery    MedHx: Denies    Medication: PNV, aspirin    FamHx: Father with heart disease and DM    Shx: Denies ETOH, drugs, smoking, anxiety, depression. Lives with spouse and daughter

## 2024-10-17 NOTE — OB PROVIDER TRIAGE NOTE - ADDITIONAL INSTRUCTIONS
Continue prenatal vitamins   If water breaks, you develop contractions, or notice vaginal bleeding return to delivery room  Check the baby movements with daily fetal kick count  Modified activities: walk as tolerated to prevent clot formation  Increase hydration, drink 2-3liters of water per day; avoid caffeine beverages which can cause palpitations and dehydration  Advised patient to take Tums as needed for reflux symptoms, tylenol as needed for pain   follow up as scheduled outpatient   return precautions discussed

## 2024-10-17 NOTE — OB PROVIDER TRIAGE NOTE - NS_FINALEDD_OBGYN_ALL_OB_DT
Thank you for choosing us for your  care today  If you have any questions about your ultrasound or care, please do not hesitate to contact us or your primary obstetrician  Some general instructions for your pregnancy are:     Protect against coronavirus: Continue to practice social distancing, wear a mask, and wash your hands often  Pregnant women are increased risk of severe COVID  Notify your primary care doctor if you have any symptoms including cough, shortness of breath or difficulty breathing, fever, chills, muscle pain, sore throat, or loss of taste or smell  Pregnant women can receive the coronavirus vaccine   Exercise: Aim for 22 minutes per day (150 minutes per week) of regular exercise  Walking is great!  Nutrition: aim for calcium-rich and iron-rich foods as well as healthy sources of protein   Protect against the flu: get yourself and your entire household vaccinated against influenza  This will protect your baby   Learn about Preeclampsia: preeclampsia is a common, serious high blood pressure complication in pregnancy  A blood pressure of 318QPPB (systolic or top number) or 37FDNN (diastolic or bottom number) is not normal and needs evaluation by your doctor   If you smoke, try to reduce how many cigarettes you smoke or try to quit completely  Do not vape   Other warning signs to watch out for in pregnancy or postpartum: chest pain, obstructed breathing or shortness of breath, seizures, thoughts of hurting yourself or your baby, bleeding, a painful or swollen leg, fever, or headache (see [facility] POST-BIRTH Warning Signs campaign)  If these happen call 911  Itching is also not normal in pregnancy and if you experience this, especially over your hands and feet, potentially worse at night, notify your doctors     Lastly, if you are contacted regarding participation in a survey about your experience in our office, please know that we take any feedback you provide seriously and use it to improve how we deliver care through our center  26-Feb-2025

## 2024-10-17 NOTE — OB RN TRIAGE NOTE - INTERNATIONAL TRAVEL
No

## 2024-10-17 NOTE — OB PROVIDER TRIAGE NOTE - NSHPLABSRESULTS_GEN_ALL_CORE
13.2   11.17 )-----------( 217      ( 17 Oct 2024 13:22 )             39.2   Urinalysis Basic - ( 17 Oct 2024 13:22 )    Color: Yellow / Appearance: Cloudy / S.019 / pH: x  Gluc: x / Ketone: Negative mg/dL  / Bili: Negative / Urobili: 1.0 mg/dL   Blood: x / Protein: Negative mg/dL / Nitrite: Negative   Leuk Esterase: Trace / RBC: x / WBC x   Sq Epi: x / Non Sq Epi: x / Bacteria: x 13.2    )-----------( 217      ( 17 Oct 2024 13:22 )             39.2   Urinalysis Basic - ( 17 Oct 2024 13:22 )    Color: Yellow / Appearance: Cloudy / S.019 / pH: x  Gluc: x / Ketone: Negative mg/dL  / Bili: Negative / Urobili: 1.0 mg/dL   Blood: x / Protein: Negative mg/dL / Nitrite: Negative   Leuk Esterase: Trace / RBC: x / WBC x   Sq Epi: x / Non Sq Epi: x / Bacteria: x    10-17    138  |  107  |  8   ----------------------------<  101[H]  4.0   |  26  |  0.49[L]    Ca    9.4      17 Oct 2024 13:22    TPro  7.4  /  Alb  3.2[L]  /  TBili  0.4  /  DBili  x   /  AST  16  /  ALT  27  /  AlkPhos  89  10-17

## 2024-10-17 NOTE — OB RN TRIAGE NOTE - FALL HARM RISK - UNIVERSAL INTERVENTIONS
Bed in lowest position, wheels locked, appropriate side rails in place/Call bell, personal items and telephone in reach/Instruct patient to call for assistance before getting out of bed or chair/Non-slip footwear when patient is out of bed/[place] to call system/Physically safe environment - no spills, clutter or unnecessary equipment/Purposeful Proactive Rounding/Room/bathroom lighting operational, light cord in reach

## 2024-10-17 NOTE — OB PROVIDER TRIAGE NOTE - NSOBPROVIDERNOTE_OBGYN_ALL_OB_FT
28 y/o  at 20tfs1a YENY 2025 LMP 2023 presents from ED for RUQ pain that radiates to right side for the past three days. States that pain is aggravated by food. Reports occasional urinary urgency. Pt on progesterone for short cervix as per MFM.     PNC with Dr. Cavazos    Plan  1) UA, CBC, CMP  2) NST    Discuss with Dr. Cavazos (Attending/House Attending) 28 y/o  at 91kgc4o YENY 2025 LMP 2023 presents from ED for RUQ pain that radiates to right side for the past three days. States that pain is aggravated by food. Reports occasional urinary urgency.     PNC with Dr. Cavazos    Plan  1) UA, CBC, CMP  2) monitor for contractions   attempted to call private attending, Dr. Cavazos   Discussed with Dr. Harkins (Attending/House Attending)      Follow up laborwork   UA/CBC/CMP unremarkable   toco without contractions   Discussed with Dr. Harkins   Advised patient to take Tums as needed for reflux symptoms, tylenol as needed for pain   follow up as scheduled outpatient   return precautions discussed

## 2024-10-17 NOTE — OB RN TRIAGE NOTE - NSICDXPASTMEDICALHX_GEN_ALL_CORE_FT
PAST MEDICAL HISTORY:  Migraines     Status post hysteroscopic surgical removal of uterine septum

## 2024-10-17 NOTE — OB PROVIDER TRIAGE NOTE - NS_OBGYNHISTORY_OBGYN_ALL_OB_FT
OBHx:   1) SAB with D&C at 16wks, 2022  2) SAB with pills at 4wks, 10/2022  3) , 2023, 6lb 16 oz, on Progesterone for first 3 months    GYNHx: Reports no hx of STIs, fibroids or cysts. PAP LGSIL

## 2024-10-17 NOTE — OB RN TRIAGE NOTE - NSMATERNALFETALCONCERNS_OBGYN_ALL_OB_FT
Fetal Alert  24 - Carrier for G6PD.  Notify peds to assess for symptoms in the . -Cheryl Levy RNC

## 2024-10-17 NOTE — OB PROVIDER TRIAGE NOTE - NSHPPHYSICALEXAM_GEN_ALL_CORE
Gen: aox3, appears comfortable with minimal pain at this time, no acute distress  Abd: soft, gravid, reports tenderness in RUQ and right flank  Ext: warm, no calf tenderness B/L    FHR: 150  No ctx

## 2024-10-18 ENCOUNTER — OUTPATIENT (OUTPATIENT)
Dept: OUTPATIENT SERVICES | Facility: HOSPITAL | Age: 29
LOS: 1 days | End: 2024-10-18
Payer: COMMERCIAL

## 2024-10-18 ENCOUNTER — ASOB RESULT (OUTPATIENT)
Age: 29
End: 2024-10-18

## 2024-10-18 ENCOUNTER — APPOINTMENT (OUTPATIENT)
Dept: ANTEPARTUM | Facility: CLINIC | Age: 29
End: 2024-10-18
Payer: COMMERCIAL

## 2024-10-18 VITALS
HEART RATE: 87 BPM | TEMPERATURE: 98 F | SYSTOLIC BLOOD PRESSURE: 113 MMHG | RESPIRATION RATE: 15 BRPM | DIASTOLIC BLOOD PRESSURE: 57 MMHG

## 2024-10-18 DIAGNOSIS — O09.292 SUPERVISION OF PREGNANCY WITH OTHER POOR REPRODUCTIVE OR OBSTETRIC HISTORY, SECOND TRIMESTER: ICD-10-CM

## 2024-10-18 DIAGNOSIS — O99.352 DISEASES OF THE NERVOUS SYSTEM COMPLICATING PREGNANCY, SECOND TRIMESTER: ICD-10-CM

## 2024-10-18 DIAGNOSIS — O26.899 OTHER SPECIFIED PREGNANCY RELATED CONDITIONS, UNSPECIFIED TRIMESTER: ICD-10-CM

## 2024-10-18 DIAGNOSIS — Z3A.21 21 WEEKS GESTATION OF PREGNANCY: ICD-10-CM

## 2024-10-18 DIAGNOSIS — O26.892 OTHER SPECIFIED PREGNANCY RELATED CONDITIONS, SECOND TRIMESTER: ICD-10-CM

## 2024-10-18 DIAGNOSIS — R10.11 RIGHT UPPER QUADRANT PAIN: ICD-10-CM

## 2024-10-18 DIAGNOSIS — G43.909 MIGRAINE, UNSPECIFIED, NOT INTRACTABLE, WITHOUT STATUS MIGRAINOSUS: ICD-10-CM

## 2024-10-18 DIAGNOSIS — R35.0 FREQUENCY OF MICTURITION: ICD-10-CM

## 2024-10-18 DIAGNOSIS — O26.872 CERVICAL SHORTENING, SECOND TRIMESTER: ICD-10-CM

## 2024-10-18 LAB
ALBUMIN SERPL ELPH-MCNC: 2.9 G/DL — LOW (ref 3.5–5)
ALP SERPL-CCNC: 84 U/L — SIGNIFICANT CHANGE UP (ref 40–120)
ALT FLD-CCNC: 25 U/L DA — SIGNIFICANT CHANGE UP (ref 10–60)
ANION GAP SERPL CALC-SCNC: 7 MMOL/L — SIGNIFICANT CHANGE UP (ref 5–17)
APPEARANCE UR: CLEAR — SIGNIFICANT CHANGE UP
AST SERPL-CCNC: 16 U/L — SIGNIFICANT CHANGE UP (ref 10–40)
BASOPHILS # BLD AUTO: 0.03 K/UL — SIGNIFICANT CHANGE UP (ref 0–0.2)
BASOPHILS NFR BLD AUTO: 0.3 % — SIGNIFICANT CHANGE UP (ref 0–2)
BILIRUB SERPL-MCNC: 0.4 MG/DL — SIGNIFICANT CHANGE UP (ref 0.2–1.2)
BILIRUB UR-MCNC: NEGATIVE — SIGNIFICANT CHANGE UP
BUN SERPL-MCNC: 7 MG/DL — SIGNIFICANT CHANGE UP (ref 7–18)
CALCIUM SERPL-MCNC: 8.6 MG/DL — SIGNIFICANT CHANGE UP (ref 8.4–10.5)
CHLORIDE SERPL-SCNC: 108 MMOL/L — SIGNIFICANT CHANGE UP (ref 96–108)
CO2 SERPL-SCNC: 23 MMOL/L — SIGNIFICANT CHANGE UP (ref 22–31)
COLOR SPEC: YELLOW — SIGNIFICANT CHANGE UP
CREAT SERPL-MCNC: 0.48 MG/DL — LOW (ref 0.5–1.3)
DIFF PNL FLD: NEGATIVE — SIGNIFICANT CHANGE UP
EGFR: 132 ML/MIN/1.73M2 — SIGNIFICANT CHANGE UP
EOSINOPHIL # BLD AUTO: 0.11 K/UL — SIGNIFICANT CHANGE UP (ref 0–0.5)
EOSINOPHIL NFR BLD AUTO: 1.1 % — SIGNIFICANT CHANGE UP (ref 0–6)
GLUCOSE SERPL-MCNC: 95 MG/DL — SIGNIFICANT CHANGE UP (ref 70–99)
GLUCOSE UR QL: NEGATIVE MG/DL — SIGNIFICANT CHANGE UP
HCT VFR BLD CALC: 35.9 % — SIGNIFICANT CHANGE UP (ref 34.5–45)
HGB BLD-MCNC: 12.3 G/DL — SIGNIFICANT CHANGE UP (ref 11.5–15.5)
IMM GRANULOCYTES NFR BLD AUTO: 0.2 % — SIGNIFICANT CHANGE UP (ref 0–0.9)
KETONES UR-MCNC: NEGATIVE MG/DL — SIGNIFICANT CHANGE UP
LEUKOCYTE ESTERASE UR-ACNC: NEGATIVE — SIGNIFICANT CHANGE UP
LYMPHOCYTES # BLD AUTO: 1.75 K/UL — SIGNIFICANT CHANGE UP (ref 1–3.3)
LYMPHOCYTES # BLD AUTO: 17.1 % — SIGNIFICANT CHANGE UP (ref 13–44)
MCHC RBC-ENTMCNC: 32.5 PG — SIGNIFICANT CHANGE UP (ref 27–34)
MCHC RBC-ENTMCNC: 34.3 GM/DL — SIGNIFICANT CHANGE UP (ref 32–36)
MCV RBC AUTO: 94.7 FL — SIGNIFICANT CHANGE UP (ref 80–100)
MONOCYTES # BLD AUTO: 0.65 K/UL — SIGNIFICANT CHANGE UP (ref 0–0.9)
MONOCYTES NFR BLD AUTO: 6.3 % — SIGNIFICANT CHANGE UP (ref 2–14)
NEUTROPHILS # BLD AUTO: 7.69 K/UL — HIGH (ref 1.8–7.4)
NEUTROPHILS NFR BLD AUTO: 75 % — SIGNIFICANT CHANGE UP (ref 43–77)
NITRITE UR-MCNC: NEGATIVE — SIGNIFICANT CHANGE UP
NRBC # BLD: 0 /100 WBCS — SIGNIFICANT CHANGE UP (ref 0–0)
PH UR: 7.5 — SIGNIFICANT CHANGE UP (ref 5–8)
PLATELET # BLD AUTO: 207 K/UL — SIGNIFICANT CHANGE UP (ref 150–400)
POTASSIUM SERPL-MCNC: 3.4 MMOL/L — LOW (ref 3.5–5.3)
POTASSIUM SERPL-SCNC: 3.4 MMOL/L — LOW (ref 3.5–5.3)
PROT SERPL-MCNC: 6.9 G/DL — SIGNIFICANT CHANGE UP (ref 6–8.3)
PROT UR-MCNC: NEGATIVE MG/DL — SIGNIFICANT CHANGE UP
RAPID RVP RESULT: SIGNIFICANT CHANGE UP
RBC # BLD: 3.79 M/UL — LOW (ref 3.8–5.2)
RBC # FLD: 11.7 % — SIGNIFICANT CHANGE UP (ref 10.3–14.5)
SARS-COV-2 RNA SPEC QL NAA+PROBE: SIGNIFICANT CHANGE UP
SODIUM SERPL-SCNC: 138 MMOL/L — SIGNIFICANT CHANGE UP (ref 135–145)
SP GR SPEC: 1.02 — SIGNIFICANT CHANGE UP (ref 1–1.03)
UROBILINOGEN FLD QL: 1 MG/DL — SIGNIFICANT CHANGE UP (ref 0.2–1)
WBC # BLD: 10.25 K/UL — SIGNIFICANT CHANGE UP (ref 3.8–10.5)
WBC # FLD AUTO: 10.25 K/UL — SIGNIFICANT CHANGE UP (ref 3.8–10.5)

## 2024-10-18 PROCEDURE — 76815 OB US LIMITED FETUS(S): CPT

## 2024-10-18 PROCEDURE — 85025 COMPLETE CBC W/AUTO DIFF WBC: CPT

## 2024-10-18 PROCEDURE — G0463: CPT

## 2024-10-18 PROCEDURE — 76775 US EXAM ABDO BACK WALL LIM: CPT | Mod: 26

## 2024-10-18 PROCEDURE — 80053 COMPREHEN METABOLIC PANEL: CPT

## 2024-10-18 PROCEDURE — 0225U NFCT DS DNA&RNA 21 SARSCOV2: CPT

## 2024-10-18 PROCEDURE — 36415 COLL VENOUS BLD VENIPUNCTURE: CPT

## 2024-10-18 PROCEDURE — 99213 OFFICE O/P EST LOW 20 MIN: CPT

## 2024-10-18 PROCEDURE — 76775 US EXAM ABDO BACK WALL LIM: CPT

## 2024-10-18 PROCEDURE — 81003 URINALYSIS AUTO W/O SCOPE: CPT

## 2024-10-18 PROCEDURE — 76817 TRANSVAGINAL US OBSTETRIC: CPT

## 2024-10-18 PROCEDURE — 87086 URINE CULTURE/COLONY COUNT: CPT

## 2024-10-18 RX ORDER — SODIUM CHLORIDE IRRIG SOLUTION 0.9 %
1000 SOLUTION, IRRIGATION IRRIGATION ONCE
Refills: 0 | Status: COMPLETED | OUTPATIENT
Start: 2024-10-18 | End: 2024-10-18

## 2024-10-18 RX ORDER — ACETAMINOPHEN 325 MG
2 TABLET ORAL
Qty: 120 | Refills: 1
Start: 2024-10-18 | End: 2024-11-16

## 2024-10-18 RX ORDER — NITROFURANTOIN MONOHYD/M-CRYST 100 MG
1 CAPSULE ORAL
Qty: 21 | Refills: 0
Start: 2024-10-18 | End: 2024-10-24

## 2024-10-18 RX ORDER — ACETAMINOPHEN 325 MG
2 TABLET ORAL
Qty: 24 | Refills: 1
Start: 2024-10-18 | End: 2024-10-23

## 2024-10-18 RX ORDER — NITROFURANTOIN MONOHYD/M-CRYST 100 MG
1 CAPSULE ORAL
Qty: 14 | Refills: 0
Start: 2024-10-18 | End: 2024-10-24

## 2024-10-18 RX ADMIN — Medication 1000 MILLILITER(S): at 16:15

## 2024-10-18 NOTE — OB PROVIDER TRIAGE NOTE - NSHPLABSRESULTS_GEN_ALL_CORE
< from: US Renal (10.18.24 @ 15:42) >    TECHNIQUE: Sonography of the kidneys and bladder.    FINDINGS:  Right kidney: 10.6 cm. Moderate hydronephrosis. There is a nonobstructing   stone in the upper pole measuring 0.9 cm.    Left kidney: 12.9 cm. No hydronephrosis or calculi.    Urinary bladder: Within normal limits.    IMPRESSION:  Moderate right hydronephrosis concerning for distal/nonvisualized stone.   CT renal stone hunt is recommended for further evaluation.    Additional 0.9 cm nonobstructive stone in the upper pole of the right   kidney.

## 2024-10-18 NOTE — OB PROVIDER TRIAGE NOTE - HISTORY OF PRESENT ILLNESS
30yo  siup at 21 2/7weeks sent by dr burger from the office r/o pyelo vs kidney stone    denies fever/chills/n/v/d    +dysuria started yesterday per pt    denies LOF/ vag bleeding    PNC with Dr. Cavazos    OBHx:   1) SAB with D&C at 16wks, 2022  2) SAB with pills at 4wks, 10/2022  3) , 2023, 6lb 16 oz, on Progesterone for first 3 months    GYNHx: Reports no hx of STIs, fibroids or cysts. PAP LGSIL    SurgHx: Reports no other surgery    MedHx: Denies    Medication: PNV, aspirin    FamHx: Father with heart disease and DM    Shx: Denies ETOH, drugs, smoking, anxiety, depression. Lives with spouse and daughter

## 2024-10-18 NOTE — OB PROVIDER TRIAGE NOTE - YOU WERE IN THE HOSPITAL FOR:
pregnant with kidney stone non obstructive - will be seen out patient with urology for further management     - pain medication as per dr felix    - patient has dysuria: macrobid as per dr felix

## 2024-10-18 NOTE — OB PROVIDER TRIAGE NOTE - NSHPPHYSICALEXAM_GEN_ALL_CORE
Vital Signs Last 24 Hrs  T(C): 36.4 (18 Oct 2024 14:57), Max: 36.4 (18 Oct 2024 14:57)  T(F): 97.6 (18 Oct 2024 14:57), Max: 97.6 (18 Oct 2024 14:57)  HR: 87 (18 Oct 2024 15:14) (76 - 87)  BP: 113/57 (18 Oct 2024 15:14) (108/71 - 113/57)  RR: 15 (18 Oct 2024 15:14) (15 - 18)  SpO2: 100% (18 Oct 2024 14:57) (100% - 100%)    FHT    toco no ctxs    pt alert not in acute distress     skin warm dry good color    abd gravid soft No guarding no rebound    back: +RCVAT no CVAT    ve deferred     extrem no edema b/l

## 2024-10-18 NOTE — OB PROVIDER TRIAGE NOTE - NSOBPROVIDERNOTE_OBGYN_ALL_OB_FT
28yo  siup at 21 2/7weeks sent by dr burger from the office r/o pyelo vs kidney stone    afebrile/ no n/v    +dysuria    - labs/urine culture/ iv fluids/ renal sono    - pt had sono today by mfm 30yo  siup at 21 2/7weeks sent by dr burger from the office r/o pyelo vs kidney stone    afebrile/ no n/v    +dysuria    - labs/urine culture/ iv fluids/ renal sono    - pt had sono today by [facility]    pregnant with kidney stone non obstructive - will be seen out patient with urology for further management     - pain medication as per dr felix    - patient has dysuria: macrobid as per dr felix

## 2024-10-18 NOTE — OB RN TRIAGE NOTE - NSICDXPASTMEDICALHX_GEN_ALL_CORE_FT
Physical Therapy    Visit Type: treatment  SUBJECTIVE  Patient agreed to participate in therapy this date.  Patient verbally agrees to allow the following to be present during session: son (and dtr-in-law, palliative care NP also present)  Patient more alert, smiling, eye contact, more participatory. Appears cooperative with session and family teaching.     Pain     Location: pt gestures to L hip area (per LLE hip/leg MRI, pt with edema and HAMS tear so caution to protect this area).     OBJECTIVE      Patient Activity Tolerance: 1 to 1 activity to rest (much improved this date)    Observation   Pt in slight L sidelying at start of session, in R sidelying at end of session with pillow support for bony preferences. Caution taken throughout for reducing shear forces to buttocks/wound.      Sitting Balance  (GALILEO = base of support)  Max A, improved from total A last 2 sessions. Son in front to manage all bed mobility and sitting balance. Pt at high risk for falls, PT/OT provided education re: safest handling. Pt with BUEs on railings for support and able to assist slightly. Improved posture.     Standing Balance  (GALILEO = base of support)  Total A squat pivot performed by son        Bed Mobility  Pt's son performs with Max to Total A x 1, therapy standing by for safety and addressing education for shear prevention, hip  management. Full assist for sitting balance and fall prevention. Pt's son/dtr-in-law present for full family teaching to demonstrate their readiness for taking him home.   Transfers  Assistive devices: gait belt  -  Bed to wheelchair, squat pivot (bed to/from reclining w/c with cushion, armrest removed)       -  -        - total assist - non-dependent  Total A of pt's son, Min A from writer for hip clearance and safety, OT managing W/c and handling tips. Pt's son feeling prepared to take him home and feel they have all the equipment necessary     Interventions     Additional exercise details: Gentle BLE  extension in supine, seated LAQ (passive overall, slight activation)   Skilled input: Verbal instruction/cues and posture correction  Additional Intervention Details: Much time spent discussing home safety tips with his family, readiness for home discharge. They feel fully prepared to take him home, no equipment needs.          Education:   - Present and ready to learn: patient    ASSESSMENT   Progress: progressing toward goals  Interferring components: decreased activity tolerance    Discharge needs based on today's assessment:   - Current level of function: significantly below baseline level of function   - Therapy needs at discharge: therapy 5 or more times per week   - Activities of daily living (ADLs) requiring support at discharge: transfers, ambulation, stairs and bed mobility   - Instrumental activities of daily living (IADLs) requiring support at discharge: shopping, meal preparation, health/medication management and community mobility   - Impairments that require further therapy intervention: activity tolerance, balance and strength    AM-PAC  - Generalized Prior Level of Function: Needs a little help ([facility] 12-21)       Key: MOD A=moderate assistance, IND/MOD I=independent/modified independent  - Generalized Current Level of Function     - Current Mobility Score: 7       AM-PAC Scoring Key= >21 Modified Independent; 20-21 Supervision; 18-19 Minimal assist; 13-18 Moderate assist; 9-12 Max assist; <9 Total assist        PLAN (while hospitalized)  Suggestions for next session as indicated:   Bed mobility, transfers, standing tolerance, sitting balance, gentle ROM, family teaching if still needed     PT Frequency: 3-5 x per week      PT/OT Mobility Equipment for Discharge: To be determined  PT/OT ADL Equipment for Discharge: will monitor - need to confirm with family    Agreement to plan and goals: patient agrees with goals and treatment plan        GOALS  Review Date: 3/15/2024  Long Term Goals: (to be met  by time of discharge from hospital)  Sit to supine: Patient will complete sit to supine moderate assist.  Status: revised, this goal modified  Supine to sit: Patient will complete supine to sit moderate assist.  Status: revised, this goal modified  Sit to stand: Patient will complete sit to stand transfer with.   Status: discontinued  Stand to sit: Patient will complete stand to sit transfer with.   Status: discontinued  Squat pivot: Patient will complete squat pivot transfer with no device, total assist.   Ambulation (even): Patient will ambulate on even surface for feet with.   Status: discontinued  Documented in the chart in the following areas:  Services. Assessment/Plan.      Patient at End of Session:   Location: in bed  Safety measures: alarm system in place/re-engaged and bed rails x3  Handoff to: nurse, therapist and family/caregiver      Therapy procedure time and total treatment time can be found documented on the Time Entry flowsheet   PAST MEDICAL HISTORY:  Migraines     Status post hysteroscopic surgical removal of uterine septum

## 2024-10-19 ENCOUNTER — INPATIENT (INPATIENT)
Facility: HOSPITAL | Age: 29
LOS: 1 days | Discharge: HOME | DRG: 951 | End: 2024-10-21
Attending: OBSTETRICS & GYNECOLOGY | Admitting: OBSTETRICS & GYNECOLOGY
Payer: COMMERCIAL

## 2024-10-19 VITALS
HEART RATE: 83 BPM | DIASTOLIC BLOOD PRESSURE: 53 MMHG | RESPIRATION RATE: 17 BRPM | OXYGEN SATURATION: 99 % | SYSTOLIC BLOOD PRESSURE: 109 MMHG | TEMPERATURE: 98 F

## 2024-10-19 DIAGNOSIS — O26.899 OTHER SPECIFIED PREGNANCY RELATED CONDITIONS, UNSPECIFIED TRIMESTER: ICD-10-CM

## 2024-10-19 DIAGNOSIS — Z3A.21 21 WEEKS GESTATION OF PREGNANCY: ICD-10-CM

## 2024-10-19 DIAGNOSIS — Z34.80 ENCOUNTER FOR SUPERVISION OF OTHER NORMAL PREGNANCY, UNSPECIFIED TRIMESTER: ICD-10-CM

## 2024-10-19 DIAGNOSIS — N20.0 CALCULUS OF KIDNEY: ICD-10-CM

## 2024-10-19 LAB
ALBUMIN SERPL ELPH-MCNC: 3 G/DL — LOW (ref 3.5–5)
ALP SERPL-CCNC: 98 U/L — SIGNIFICANT CHANGE UP (ref 40–120)
ALT FLD-CCNC: 27 U/L DA — SIGNIFICANT CHANGE UP (ref 10–60)
ANION GAP SERPL CALC-SCNC: 6 MMOL/L — SIGNIFICANT CHANGE UP (ref 5–17)
APPEARANCE UR: CLEAR — SIGNIFICANT CHANGE UP
APTT BLD: 29.2 SEC — SIGNIFICANT CHANGE UP (ref 24.5–35.6)
AST SERPL-CCNC: 13 U/L — SIGNIFICANT CHANGE UP (ref 10–40)
BACTERIA # UR AUTO: ABNORMAL /HPF
BASOPHILS # BLD AUTO: 0.03 K/UL — SIGNIFICANT CHANGE UP (ref 0–0.2)
BASOPHILS NFR BLD AUTO: 0.2 % — SIGNIFICANT CHANGE UP (ref 0–2)
BILIRUB SERPL-MCNC: 0.4 MG/DL — SIGNIFICANT CHANGE UP (ref 0.2–1.2)
BILIRUB UR-MCNC: NEGATIVE — SIGNIFICANT CHANGE UP
BLD GP AB SCN SERPL QL: SIGNIFICANT CHANGE UP
BUN SERPL-MCNC: 8 MG/DL — SIGNIFICANT CHANGE UP (ref 7–18)
CALCIUM SERPL-MCNC: 9 MG/DL — SIGNIFICANT CHANGE UP (ref 8.4–10.5)
CHLORIDE SERPL-SCNC: 107 MMOL/L — SIGNIFICANT CHANGE UP (ref 96–108)
CO2 SERPL-SCNC: 24 MMOL/L — SIGNIFICANT CHANGE UP (ref 22–31)
COLOR SPEC: YELLOW — SIGNIFICANT CHANGE UP
CREAT SERPL-MCNC: 0.57 MG/DL — SIGNIFICANT CHANGE UP (ref 0.5–1.3)
DIFF PNL FLD: ABNORMAL
EGFR: 127 ML/MIN/1.73M2 — SIGNIFICANT CHANGE UP
EOSINOPHIL # BLD AUTO: 0.05 K/UL — SIGNIFICANT CHANGE UP (ref 0–0.5)
EOSINOPHIL NFR BLD AUTO: 0.4 % — SIGNIFICANT CHANGE UP (ref 0–6)
EPI CELLS # UR: PRESENT
FIBRINOGEN PPP-MCNC: 460 MG/DL — SIGNIFICANT CHANGE UP (ref 200–475)
GLUCOSE SERPL-MCNC: 124 MG/DL — HIGH (ref 70–99)
GLUCOSE UR QL: NEGATIVE MG/DL — SIGNIFICANT CHANGE UP
HCT VFR BLD CALC: 37.7 % — SIGNIFICANT CHANGE UP (ref 34.5–45)
HGB BLD-MCNC: 13 G/DL — SIGNIFICANT CHANGE UP (ref 11.5–15.5)
IMM GRANULOCYTES NFR BLD AUTO: 0.4 % — SIGNIFICANT CHANGE UP (ref 0–0.9)
INR BLD: 0.87 RATIO — SIGNIFICANT CHANGE UP (ref 0.85–1.16)
KETONES UR-MCNC: NEGATIVE MG/DL — SIGNIFICANT CHANGE UP
LEUKOCYTE ESTERASE UR-ACNC: NEGATIVE — SIGNIFICANT CHANGE UP
LYMPHOCYTES # BLD AUTO: 1.25 K/UL — SIGNIFICANT CHANGE UP (ref 1–3.3)
LYMPHOCYTES # BLD AUTO: 9.7 % — LOW (ref 13–44)
MCHC RBC-ENTMCNC: 32.4 PG — SIGNIFICANT CHANGE UP (ref 27–34)
MCHC RBC-ENTMCNC: 34.5 GM/DL — SIGNIFICANT CHANGE UP (ref 32–36)
MCV RBC AUTO: 94 FL — SIGNIFICANT CHANGE UP (ref 80–100)
MONOCYTES # BLD AUTO: 0.47 K/UL — SIGNIFICANT CHANGE UP (ref 0–0.9)
MONOCYTES NFR BLD AUTO: 3.6 % — SIGNIFICANT CHANGE UP (ref 2–14)
NEUTROPHILS # BLD AUTO: 11.08 K/UL — HIGH (ref 1.8–7.4)
NEUTROPHILS NFR BLD AUTO: 85.7 % — HIGH (ref 43–77)
NITRITE UR-MCNC: NEGATIVE — SIGNIFICANT CHANGE UP
NRBC # BLD: 0 /100 WBCS — SIGNIFICANT CHANGE UP (ref 0–0)
PH UR: 7.5 — SIGNIFICANT CHANGE UP (ref 5–8)
PLATELET # BLD AUTO: 213 K/UL — SIGNIFICANT CHANGE UP (ref 150–400)
PLATELET # BLD AUTO: 214 K/UL — SIGNIFICANT CHANGE UP (ref 150–400)
POTASSIUM SERPL-MCNC: 3.4 MMOL/L — LOW (ref 3.5–5.3)
POTASSIUM SERPL-SCNC: 3.4 MMOL/L — LOW (ref 3.5–5.3)
PROT SERPL-MCNC: 7.4 G/DL — SIGNIFICANT CHANGE UP (ref 6–8.3)
PROT UR-MCNC: NEGATIVE MG/DL — SIGNIFICANT CHANGE UP
PROTHROM AB SERPL-ACNC: 10.1 SEC — SIGNIFICANT CHANGE UP (ref 9.9–13.4)
RBC # BLD: 4.01 M/UL — SIGNIFICANT CHANGE UP (ref 3.8–5.2)
RBC # FLD: 11.7 % — SIGNIFICANT CHANGE UP (ref 10.3–14.5)
RBC CASTS # UR COMP ASSIST: 25 /HPF — HIGH (ref 0–4)
SODIUM SERPL-SCNC: 137 MMOL/L — SIGNIFICANT CHANGE UP (ref 135–145)
SP GR SPEC: 1.01 — SIGNIFICANT CHANGE UP (ref 1–1.03)
UROBILINOGEN FLD QL: 1 MG/DL — SIGNIFICANT CHANGE UP (ref 0.2–1)
WBC # BLD: 12.93 K/UL — HIGH (ref 3.8–10.5)
WBC # FLD AUTO: 12.93 K/UL — HIGH (ref 3.8–10.5)
WBC UR QL: 2 /HPF — SIGNIFICANT CHANGE UP (ref 0–5)

## 2024-10-19 RX ORDER — METOCLOPRAMIDE HCL 10 MG
10 TABLET ORAL ONCE
Refills: 0 | Status: DISCONTINUED | OUTPATIENT
Start: 2024-10-19 | End: 2024-10-21

## 2024-10-19 RX ORDER — ACETAMINOPHEN 500 MG
1000 TABLET ORAL ONCE
Refills: 0 | Status: COMPLETED | OUTPATIENT
Start: 2024-10-19 | End: 2024-10-20

## 2024-10-19 RX ORDER — ONDANSETRON HYDROCHLORIDE 2 MG/ML
4 INJECTION, SOLUTION INTRAMUSCULAR; INTRAVENOUS ONCE
Refills: 0 | Status: COMPLETED | OUTPATIENT
Start: 2024-10-19 | End: 2024-10-19

## 2024-10-19 RX ORDER — MORPHINE SULFATE 30 MG/1
4 TABLET, EXTENDED RELEASE ORAL EVERY 6 HOURS
Refills: 0 | Status: DISCONTINUED | OUTPATIENT
Start: 2024-10-19 | End: 2024-10-21

## 2024-10-19 RX ORDER — INFLUENZ VIR VAC TV P-SURF2003 15MCG/.5ML
0.5 SYRINGE (ML) INTRAMUSCULAR ONCE
Refills: 0 | Status: DISCONTINUED | OUTPATIENT
Start: 2024-10-19 | End: 2024-10-21

## 2024-10-19 RX ORDER — POTASSIUM CHLORIDE 10 MEQ
40 TABLET, EXTENDED RELEASE ORAL ONCE
Refills: 0 | Status: COMPLETED | OUTPATIENT
Start: 2024-10-19 | End: 2024-10-19

## 2024-10-19 RX ORDER — ONDANSETRON HYDROCHLORIDE 2 MG/ML
4 INJECTION, SOLUTION INTRAMUSCULAR; INTRAVENOUS ONCE
Refills: 0 | Status: DISCONTINUED | OUTPATIENT
Start: 2024-10-19 | End: 2024-10-21

## 2024-10-19 RX ADMIN — Medication 500 MILLILITER(S): at 17:08

## 2024-10-19 RX ADMIN — MORPHINE SULFATE 4 MILLIGRAM(S): 30 TABLET, EXTENDED RELEASE ORAL at 17:25

## 2024-10-19 RX ADMIN — ONDANSETRON HYDROCHLORIDE 4 MILLIGRAM(S): 2 INJECTION, SOLUTION INTRAMUSCULAR; INTRAVENOUS at 17:09

## 2024-10-19 RX ADMIN — Medication 150 MILLILITER(S): at 18:49

## 2024-10-19 RX ADMIN — MORPHINE SULFATE 4 MILLIGRAM(S): 30 TABLET, EXTENDED RELEASE ORAL at 17:10

## 2024-10-19 RX ADMIN — MORPHINE SULFATE 4 MILLIGRAM(S): 30 TABLET, EXTENDED RELEASE ORAL at 23:03

## 2024-10-19 RX ADMIN — Medication 250 MILLILITER(S): at 20:32

## 2024-10-19 RX ADMIN — Medication 40 MILLIEQUIVALENT(S): at 20:05

## 2024-10-19 NOTE — OB PROVIDER H&P - PROBLEM SELECTOR PROBLEM 2
Return to normal activities    Long term, stretching / strengthening advised    Call if symptoms not resolving    Over the counter meds as needed    Cold / heat as is helpful       21 weeks gestation of pregnancy

## 2024-10-19 NOTE — OB RN PATIENT PROFILE - FALL HARM RISK - FALL HARM RISK
WORK ON SMOKING CESSATION.   Stop QVAR. Start Trelegy Ellipta 200/62.5/25 mcg 1 puff once daily.     Use Xopendx or albuterol as needed.     [facility]  for sweat chloride test    Continue with Flonase and gentamycin.    Get the bloodwork done in 4-6 weeks.       Continue with Dupixent.    Call to schedule breathing test    [place]: [phone]      Do not use albuterol on the day of the breathing test if possible.         Allergy Staff Appt Hours Shot Hours Locations    Physician     Doni Lee MD       Support Staff     Rosalinda BARRERA, RN      Carmen BARRERA CMA  Tuesday:   [place] :  [place]: :         [place]:  [place]3 [place]        Tuesday: : :: : :: :[facility] [place]       Tues & Wed: : & Thurs: :       Fri: :20         [facility]  [address] [place] [address]  Appt Line: [phone]    [facility]  [address] [place] [address]  Appt Line: [phone]    Pulmonary Function Scheduling:  [place]: [phone]  [place]: [phone]  [phone]     Prescription Assistance    If you need assistance with your prescriptions (cost, coverage, etc) please contact: [place] [facility] [phone]    Important Scheduling Information    Appointments for skin testing: Appointment will last approximately 45 minutes.  Please call the appointment line for your clinic to schedule.  Discontinue oral antihistamines 7 days prior to the appointment.  Discontinue nasal and ocular antihistamines 4 days prior to appointment.    Appointments for challenges (oral food challenge, penicillin testing, aspirin desensitization) If your provider instructs you to that this additional testing is indicated, it will need to be scheduled directly through the allergy  department.  Please contact them via [place] or by calling the clinic and asking to speak with the allergy team.  They will provide additional information and instructions for the appointment.  Discontinue oral antihistamines 7 days prior to the appointment.  Discontinue nasal and ocular antihistamines 4 days prior to the appointment.    Thank you for trusting us with your care. Please feel free to contact us with any questions or concerns you may have.      No indicators present

## 2024-10-19 NOTE — OB RN TRIAGE NOTE - LANGUAGE ASSISTANCE NEEDED
Pt states she can understand and speak english/No-Patient/Caregiver offered and refused free interpretation services.

## 2024-10-19 NOTE — OB PROVIDER H&P - ASSESSMENT
28yo  siup at 21 3/7weeks with right kidney stone     - admit for pain management     - iv fluids    - labs    - urology consult - notified

## 2024-10-19 NOTE — OB RN PATIENT PROFILE - NS_OBGYNHISTORY_OBGYN_ALL_OB_FT
SAB x1 3/2020 16wks  TOP W/D&C 10/2020 4wks   2023 Girl 37wks 6lb 19oz    Uterine Septum   Known right kidney stone unobstructed

## 2024-10-19 NOTE — OB PROVIDER H&P - NSHPPHYSICALEXAM_GEN_ALL_CORE
Vital Signs Last 24 Hrs  T(C): 36.7 (19 Oct 2024 16:21), Max: 36.7 (19 Oct 2024 16:21)  T(F): 98.1 (19 Oct 2024 16:21), Max: 98.1 (19 Oct 2024 16:21)  HR: 83 (19 Oct 2024 16:21) (83 - 93)  BP: 109/53 (19 Oct 2024 16:21) (107/68 - 109/53)  BP(mean): --  RR: 17 (19 Oct 2024 16:21) (17 - 20)  SpO2: 99% (19 Oct 2024 16:21) (99% - 100%)    Parameters below as of 19 Oct 2024 16:21  Patient On (Oxygen Delivery Method): room air    doptone     toco no ctxs    pt alert c/o right flank pain    skin warm dry good color    abd gravid soft no rigidity no guarding    back: +RCVAT    pelvic deferred    extrem no edema b/l

## 2024-10-19 NOTE — OB PROVIDER H&P - HISTORY OF PRESENT ILLNESS
30yo  pregnant at 21 2/7weeks known right kidney stone by sonogram yesterday came back today 3rd time - 3 days in a row c/o more back pain with vomiting     denies fever/chills; denies n/v/d; denies dysuria    denies LOF/ vag bleeding    PNC with Dr. Cavazos    OBHx:   1) SAB with D&C at 16wks, 2022  2) SAB with pills at 4wks, 10/2022  3) , 2023, 6lb 16 oz, on Progesterone for first 3 months    GYNHx: Reports no hx of STIs, fibroids or cysts. PAP LGSIL    SurgHx: Reports no other surgery    MedHx: Denies    Medication: PNV, aspirin    FamHx: Father with heart disease and DM    Shx: Denies ETOH, drugs, smoking, anxiety, depression. Lives with spouse and daughter

## 2024-10-19 NOTE — OB RN PATIENT PROFILE - NSPROMEDSBROUGHTTOHOSP_GEN_A_NUR
Patient Seen in: [facility] Emergency Department      History   Patient presents with:  Cough/URI  Fever    Stated Complaint:     HPI  72 yo female with history of asthma, MS, COPD, GERD, MVP, MRSA pneumonia with lobectomy, neuropathy on Norco, padilla CHOLECYSTECTOMY     • COLECTOMY     • COLECTOMY     • ENDOSCOPIC RETROGRADE CHOLANGIOPANCREATOGRAPHY (ERCP) N/A 2/27/2014    Performed by Nobie Brittle, MD at [place] [address]. (EUS) N/A 10/5/2015    Performed by Nobie Brittle, MD MAIN OR   • TOTAL ABDOM HYSTERECTOMY     • TOTAL KNEE REPLACEMENT     • VENTRAL HERNIA WITH COMPONENT SEPARATION N/A 9/2/2015    Performed by Yissel Whitley MD at [facility] MAIN OR                    Social History    Tobacco Use      Smoking status: Former [employer] Pulmonary effort is normal. No respiratory distress. Breath sounds: Normal breath sounds. No stridor. No wheezing, rhonchi or rales. Musculoskeletal: Normal range of motion. Right lower leg: No edema. Left lower leg: No edema.    Kaley Mayo Sinus Rhythm  Reading: Normal sinus rhythm  Normal ECG  When compared with ECG of 22-JUL-2019 16:24,  No significant change was found              Xr Chest Ap Portable  (cpt=71045)    Result Date: 4/16/2020  PROCEDURE:  XR CHEST AP PORTABLE  (CPT=71045)  T Tab  Take 1 tablet (500 mg total) by mouth daily for 7 days. Qty: 7 tablet Refills: 0    !! guaiFENesin-codeine 100-10 MG/5ML Oral Solution  Take 5 mL by mouth every 6 (six) hours as needed for cough.   Qty: 100 mL Refills: 0    !! - Potential duplicate me no

## 2024-10-19 NOTE — OB RN PATIENT PROFILE - FALL HARM RISK - UNIVERSAL INTERVENTIONS
Bed in lowest position, wheels locked, appropriate side rails in place/Call bell, personal items and telephone in reach/Instruct patient to call for assistance before getting out of bed or chair/Non-slip footwear when patient is out of bed/[place] to call system/Physically safe environment - no spills, clutter or unnecessary equipment/Purposeful Proactive Rounding/Room/bathroom lighting operational, light cord in reach

## 2024-10-20 LAB
ANION GAP SERPL CALC-SCNC: 8 MMOL/L — SIGNIFICANT CHANGE UP (ref 5–17)
BASOPHILS # BLD AUTO: 0.03 K/UL — SIGNIFICANT CHANGE UP (ref 0–0.2)
BASOPHILS NFR BLD AUTO: 0.3 % — SIGNIFICANT CHANGE UP (ref 0–2)
BUN SERPL-MCNC: 5 MG/DL — LOW (ref 7–18)
CALCIUM SERPL-MCNC: 8.7 MG/DL — SIGNIFICANT CHANGE UP (ref 8.4–10.5)
CHLORIDE SERPL-SCNC: 105 MMOL/L — SIGNIFICANT CHANGE UP (ref 96–108)
CO2 SERPL-SCNC: 27 MMOL/L — SIGNIFICANT CHANGE UP (ref 22–31)
CREAT SERPL-MCNC: 0.53 MG/DL — SIGNIFICANT CHANGE UP (ref 0.5–1.3)
CULTURE RESULTS: SIGNIFICANT CHANGE UP
EGFR: 129 ML/MIN/1.73M2 — SIGNIFICANT CHANGE UP
EOSINOPHIL # BLD AUTO: 0.06 K/UL — SIGNIFICANT CHANGE UP (ref 0–0.5)
EOSINOPHIL NFR BLD AUTO: 0.6 % — SIGNIFICANT CHANGE UP (ref 0–6)
GLUCOSE SERPL-MCNC: 92 MG/DL — SIGNIFICANT CHANGE UP (ref 70–99)
HCT VFR BLD CALC: 35 % — SIGNIFICANT CHANGE UP (ref 34.5–45)
HGB BLD-MCNC: 11.9 G/DL — SIGNIFICANT CHANGE UP (ref 11.5–15.5)
IMM GRANULOCYTES NFR BLD AUTO: 0.3 % — SIGNIFICANT CHANGE UP (ref 0–0.9)
LYMPHOCYTES # BLD AUTO: 1.86 K/UL — SIGNIFICANT CHANGE UP (ref 1–3.3)
LYMPHOCYTES # BLD AUTO: 18.5 % — SIGNIFICANT CHANGE UP (ref 13–44)
MCHC RBC-ENTMCNC: 33.1 PG — SIGNIFICANT CHANGE UP (ref 27–34)
MCHC RBC-ENTMCNC: 34 GM/DL — SIGNIFICANT CHANGE UP (ref 32–36)
MCV RBC AUTO: 97.2 FL — SIGNIFICANT CHANGE UP (ref 80–100)
MONOCYTES # BLD AUTO: 0.59 K/UL — SIGNIFICANT CHANGE UP (ref 0–0.9)
MONOCYTES NFR BLD AUTO: 5.9 % — SIGNIFICANT CHANGE UP (ref 2–14)
NEUTROPHILS # BLD AUTO: 7.5 K/UL — HIGH (ref 1.8–7.4)
NEUTROPHILS NFR BLD AUTO: 74.4 % — SIGNIFICANT CHANGE UP (ref 43–77)
NRBC # BLD: 0 /100 WBCS — SIGNIFICANT CHANGE UP (ref 0–0)
PLATELET # BLD AUTO: 183 K/UL — SIGNIFICANT CHANGE UP (ref 150–400)
POTASSIUM SERPL-MCNC: 3.3 MMOL/L — LOW (ref 3.5–5.3)
POTASSIUM SERPL-SCNC: 3.3 MMOL/L — LOW (ref 3.5–5.3)
RBC # BLD: 3.6 M/UL — LOW (ref 3.8–5.2)
RBC # FLD: 11.9 % — SIGNIFICANT CHANGE UP (ref 10.3–14.5)
SODIUM SERPL-SCNC: 140 MMOL/L — SIGNIFICANT CHANGE UP (ref 135–145)
SPECIMEN SOURCE: SIGNIFICANT CHANGE UP
T PALLIDUM AB TITR SER: NEGATIVE — SIGNIFICANT CHANGE UP
WBC # BLD: 10.07 K/UL — SIGNIFICANT CHANGE UP (ref 3.8–10.5)
WBC # FLD AUTO: 10.07 K/UL — SIGNIFICANT CHANGE UP (ref 3.8–10.5)

## 2024-10-20 PROCEDURE — 76775 US EXAM ABDO BACK WALL LIM: CPT | Mod: 26

## 2024-10-20 RX ORDER — ACETAMINOPHEN 500 MG
975 TABLET ORAL EVERY 6 HOURS
Refills: 0 | Status: DISCONTINUED | OUTPATIENT
Start: 2024-10-20 | End: 2024-10-21

## 2024-10-20 RX ORDER — MAGNESIUM HYDROXIDE 1200 MG/15ML
30 SUSPENSION ORAL DAILY
Refills: 0 | Status: DISCONTINUED | OUTPATIENT
Start: 2024-10-20 | End: 2024-10-21

## 2024-10-20 RX ORDER — ACETAMINOPHEN 500 MG
650 TABLET ORAL EVERY 6 HOURS
Refills: 0 | Status: DISCONTINUED | OUTPATIENT
Start: 2024-10-20 | End: 2024-10-20

## 2024-10-20 RX ADMIN — Medication 250 MILLILITER(S): at 22:00

## 2024-10-20 RX ADMIN — MORPHINE SULFATE 4 MILLIGRAM(S): 30 TABLET, EXTENDED RELEASE ORAL at 00:06

## 2024-10-20 RX ADMIN — Medication 1000 MILLIGRAM(S): at 03:51

## 2024-10-20 RX ADMIN — Medication 975 MILLIGRAM(S): at 14:26

## 2024-10-20 RX ADMIN — Medication 975 MILLIGRAM(S): at 15:26

## 2024-10-20 RX ADMIN — Medication 975 MILLIGRAM(S): at 23:26

## 2024-10-20 RX ADMIN — Medication 400 MILLIGRAM(S): at 02:51

## 2024-10-20 RX ADMIN — MORPHINE SULFATE 4 MILLIGRAM(S): 30 TABLET, EXTENDED RELEASE ORAL at 09:02

## 2024-10-20 RX ADMIN — MORPHINE SULFATE 4 MILLIGRAM(S): 30 TABLET, EXTENDED RELEASE ORAL at 10:02

## 2024-10-20 NOTE — DISCHARGE NOTE ANTEPARTUM - MEDICATION SUMMARY - MEDICATIONS TO STOP TAKING
I will STOP taking the medications listed below when I get home from the hospital:    nitrofurantoin macrocrystals-monohydrate 100 mg oral capsule  -- 1 cap(s) by mouth 3 times a day

## 2024-10-20 NOTE — DISCHARGE NOTE ANTEPARTUM - PATIENT PORTAL LINK FT
You can access the FollowMyHealth Patient Portal offered by [facility] by registering at the following website: [email or web address]. By joining [email or web address]’s FollowMyHealth portal, you will also be able to view your health information using other applications (apps) compatible with our system.

## 2024-10-20 NOTE — DISCHARGE NOTE ANTEPARTUM - REASON FOR ADMISSION
Recurrent severe renal colic at 21wks Recurrent severe renal colic at 21wks for right kidney stone non obstructive

## 2024-10-20 NOTE — DISCHARGE NOTE ANTEPARTUM - CARE PLAN
Principal Discharge DX:	Renal colic on right side  Secondary Diagnosis:	21 weeks gestation of pregnancy   1 Principal Discharge DX:	Kidney stone  Assessment and plan of treatment:	continue prenatal vitamins   if water breaks contractions noted vaginal bleeding noted return to delivery room  daily check the baby movements with fetal kick count  Secondary Diagnosis:	21 weeks gestation of pregnancy  Assessment and plan of treatment:	continue prenatal vitamins   if water breaks contractions noted vaginal bleeding noted return to delivery room  daily check the baby movements with fetal kick count

## 2024-10-20 NOTE — DISCHARGE NOTE ANTEPARTUM - PROVIDER TOKENS
PROVIDER:[TOKEN:[[id number]],ESTABLISHEDPATIENT:[T]] PROVIDER:[TOKEN:[[id number]],SCHEDULEDAPPT:[10/22/2024],ESTABLISHEDPATIENT:[T]]

## 2024-10-20 NOTE — DISCHARGE NOTE ANTEPARTUM - MEDICATION SUMMARY - MEDICATIONS TO TAKE
I will START or STAY ON the medications listed below when I get home from the hospital:    acetaminophen 325 mg oral tablet  -- 3 tab(s) by mouth every 6 hours as needed for Temp greater or equal to 38C (100.4F), Mild Pain (1 - 3)  -- Indication: For for pain    multivitamin, prenatal  -- 1 tab(s) by mouth once a day  -- Indication: For Supplement pregnant

## 2024-10-20 NOTE — DISCHARGE NOTE ANTEPARTUM - NS MD DC FALL RISK RISK
For information on Fall & Injury Prevention, visit: [email or web address] OR  [email or web address] OR  https://www.cdc.gov/steadi/patient.html

## 2024-10-20 NOTE — DISCHARGE NOTE ANTEPARTUM - HOSPITAL COURSE
pt admitted at 21weeks c/o severe right sided back and abdominal pain.   Iv hydration and pain management

## 2024-10-20 NOTE — DISCHARGE NOTE ANTEPARTUM - FINANCIAL ASSISTANCE
[facility] provides services at a reduced cost to those who are determined to be eligible through [facility]’s financial assistance program. Information regarding [facility]’s financial assistance program can be found by going to [email or web address] or by calling [phone].

## 2024-10-20 NOTE — DISCHARGE NOTE ANTEPARTUM - PLAN OF CARE
continue prenatal vitamins   if water breaks contractions noted vaginal bleeding noted return to delivery room  daily check the baby movements with fetal kick count

## 2024-10-20 NOTE — DISCHARGE NOTE ANTEPARTUM - CARE PROVIDER_API CALL
Haley Cavazos (MD; MS)  Obstetrics and Gynecology  [address], [address] [place] [address]  Phone: [phone]  Fax: [phone]  Established Patient  Follow Up Time:    Haley Cavazos (MD; MS)  Obstetrics and Gynecology  [address], [address] [place] [address]  Phone: [phone]  Fax: [phone]  Established Patient  Scheduled Appointment: 10/22/2024

## 2024-10-21 VITALS
SYSTOLIC BLOOD PRESSURE: 104 MMHG | DIASTOLIC BLOOD PRESSURE: 65 MMHG | RESPIRATION RATE: 18 BRPM | OXYGEN SATURATION: 98 % | TEMPERATURE: 98 F | HEART RATE: 87 BPM

## 2024-10-21 DIAGNOSIS — Z3A.21 21 WEEKS GESTATION OF PREGNANCY: ICD-10-CM

## 2024-10-21 DIAGNOSIS — O99.891 OTHER SPECIFIED DISEASES AND CONDITIONS COMPLICATING PREGNANCY: ICD-10-CM

## 2024-10-21 DIAGNOSIS — G43.909 MIGRAINE, UNSPECIFIED, NOT INTRACTABLE, WITHOUT STATUS MIGRAINOSUS: ICD-10-CM

## 2024-10-21 DIAGNOSIS — Z20.822 CONTACT WITH AND (SUSPECTED) EXPOSURE TO COVID-19: ICD-10-CM

## 2024-10-21 DIAGNOSIS — N20.0 CALCULUS OF KIDNEY: ICD-10-CM

## 2024-10-21 DIAGNOSIS — O99.352 DISEASES OF THE NERVOUS SYSTEM COMPLICATING PREGNANCY, SECOND TRIMESTER: ICD-10-CM

## 2024-10-21 DIAGNOSIS — O09.292 SUPERVISION OF PREGNANCY WITH OTHER POOR REPRODUCTIVE OR OBSTETRIC HISTORY, SECOND TRIMESTER: ICD-10-CM

## 2024-10-21 PROCEDURE — 86780 TREPONEMA PALLIDUM: CPT

## 2024-10-21 PROCEDURE — 80053 COMPREHEN METABOLIC PANEL: CPT

## 2024-10-21 PROCEDURE — 85049 AUTOMATED PLATELET COUNT: CPT

## 2024-10-21 PROCEDURE — 86900 BLOOD TYPING SEROLOGIC ABO: CPT

## 2024-10-21 PROCEDURE — 81001 URINALYSIS AUTO W/SCOPE: CPT

## 2024-10-21 PROCEDURE — 85610 PROTHROMBIN TIME: CPT

## 2024-10-21 PROCEDURE — 85384 FIBRINOGEN ACTIVITY: CPT

## 2024-10-21 PROCEDURE — 80048 BASIC METABOLIC PNL TOTAL CA: CPT

## 2024-10-21 PROCEDURE — 99232 SBSQ HOSP IP/OBS MODERATE 35: CPT

## 2024-10-21 PROCEDURE — 86850 RBC ANTIBODY SCREEN: CPT

## 2024-10-21 PROCEDURE — 85025 COMPLETE CBC W/AUTO DIFF WBC: CPT

## 2024-10-21 PROCEDURE — 36415 COLL VENOUS BLD VENIPUNCTURE: CPT

## 2024-10-21 PROCEDURE — 76775 US EXAM ABDO BACK WALL LIM: CPT

## 2024-10-21 PROCEDURE — 85730 THROMBOPLASTIN TIME PARTIAL: CPT

## 2024-10-21 PROCEDURE — 86901 BLOOD TYPING SEROLOGIC RH(D): CPT

## 2024-10-21 RX ORDER — ACETAMINOPHEN 500 MG
3 TABLET ORAL
Qty: 60 | Refills: 0
Start: 2024-10-21 | End: 2024-10-25

## 2024-10-21 RX ORDER — PRENATAL VIT/IRON FUM/FOLIC AC 60 MG-1 MG
1 TABLET ORAL
Qty: 0 | Refills: 0 | DISCHARGE

## 2024-10-21 RX ADMIN — Medication 250 MILLILITER(S): at 02:00

## 2024-10-21 RX ADMIN — Medication 975 MILLIGRAM(S): at 00:26

## 2024-10-21 RX ADMIN — Medication 250 MILLILITER(S): at 06:00

## 2024-10-21 RX ADMIN — MORPHINE SULFATE 4 MILLIGRAM(S): 30 TABLET, EXTENDED RELEASE ORAL at 01:45

## 2024-10-21 RX ADMIN — MORPHINE SULFATE 4 MILLIGRAM(S): 30 TABLET, EXTENDED RELEASE ORAL at 02:45

## 2024-10-21 NOTE — PROGRESS NOTE ADULT - SUBJECTIVE AND OBJECTIVE BOX
Pt seen at bedside, Pt states no vb, no ctx, no fever, or chills, no cp; no sob; no n/v/d/c, no h/a, blurry vision or epigastric pain, no dysuria, urgency or frequency. Tolerating regular diet, voiding without difficulty.     Vital Signs Last 24 Hrs  T(C): 36.6 (20 Oct 2024 09:28), Max: 36.8 (19 Oct 2024 19:30)  T(F): 97.8 (20 Oct 2024 09:28), Max: 98.3 (20 Oct 2024 00:58)  HR: 84 (20 Oct 2024 09:28) (74 - 94)  BP: 94/57 (20 Oct 2024 09:28) (94/57 - 109/53)  BP(mean): 75 (19 Oct 2024 18:13) (75 - 75)  RR: 17 (20 Oct 2024 09:28) (15 - 20)  SpO2: 99% (20 Oct 2024 09:28) (97% - 100%)    Parameters below as of 20 Oct 2024 09:28  Patient On (Oxygen Delivery Method): room air        PE:   Gen: A&Ox3; NAD  Abd: soft, nontender, gravid, +RT CVAT, no LT CVAT   ext: no calf pain; no edema    Urinalysis Basic - ( 20 Oct 2024 05:30 )    Color: x / Appearance: x / SG: x / pH: x  Gluc: 92 mg/dL / Ketone: x  / Bili: x / Urobili: x   Blood: x / Protein: x / Nitrite: x   Leuk Esterase: x / RBC: x / WBC x   Sq Epi: x / Non Sq Epi: x / Bacteria: x                          11.9   10.07 )-----------( 183      ( 20 Oct 2024 05:30 )             35.0     10-20    140  |  105  |  5[L]  ----------------------------<  92  3.3[L]   |  27  |  0.53    Ca    8.7      20 Oct 2024 05:30    TPro  7.4  /  Alb  3.0[L]  /  TBili  0.4  /  DBili  x   /  AST  13  /  ALT  27  /  AlkPhos  98  10-19  
antepartum HD#3 pregnant 21 +weeks w right kidney stone    last vomiting 2 days ago    last morphin 2 days ago    pt now on tylenol    pt doing well improved from the pain    rpt sonogram still the stone is non obstructing    as per dr gregg - pt is for dc home today and f/u w urology for outpt management    Vital Signs Last 24 Hrs  T(C): 36.8 (21 Oct 2024 10:10), Max: 36.8 (20 Oct 2024 13:15)  T(F): 98.3 (21 Oct 2024 10:10), Max: 98.3 (20 Oct 2024 17:18)  HR: 87 (21 Oct 2024 10:10) (86 - 90)  BP: 104/65 (21 Oct 2024 10:10) (95/60 - 104/67)  BP(mean): --  RR: 18 (21 Oct 2024 10:10) (17 - 18)  SpO2: 98% (21 Oct 2024 10:10) (97% - 100%)    Parameters below as of 21 Oct 2024 05:44  Patient On (Oxygen Delivery Method): room air    pt alert not in acute distress    skin warm dry good color    abd gravid soft no rigidity    back +RCVAT no LCVAT    pelvic no vag bleeding/LOF    extrem no edema b/l

## 2024-10-21 NOTE — PROGRESS NOTE ADULT - ASSESSMENT
antepartum HD#3 pregnant 21 +weeks w right kidney stone non obstructing    pain improved    as per ob team dc home today and f/u outpt by urology    continue prenatal vitamins   if water breaks contractions noted vaginal bleeding noted return to delivery room  daily check the baby movements with fetal kick count  
A/P: 30 yo  at 21w4d, HD #2 admitted for pain management of right kidney stone, stable    - IV fluids  - IV tylenol  - morphine PRN  - straining urine  - fetal check Q daily  - diet as tolerated  - venodynes while in bed  - CT scan ordered     D/W Dr Nash

## 2024-10-21 NOTE — CHART NOTE - NSCHARTNOTEFT_GEN_A_CORE
NSt-  moderate variability for a 21week gestation  reasuring tracing fhr- 150
Pt seen at bedside  Pt reports pain has been well controlled since she has been here, however for the past 20 minutes she has been feeling pain again in her right side/back  Pt urinating without difficulty, denies seeing blood in urine  Pt denies feeling fever/chills, N/V/D, headache, LOF, VB, ctx pain, changes in fetal movement, or any other complaints at this time.    Vital Signs Last 24 Hrs  T(C): 36.8 (19 Oct 2024 19:30), Max: 36.8 (19 Oct 2024 19:30)  T(F): 98.2 (19 Oct 2024 19:30), Max: 98.2 (19 Oct 2024 19:30)  HR: 75 (19 Oct 2024 18:13) (75 - 93)  BP: 104/58 (19 Oct 2024 18:13) (104/58 - 109/53)  BP(mean): 75 (19 Oct 2024 18:13) (75 - 75)  RR: 15 (19 Oct 2024 18:13) (15 - 20)  SpO2: 98% (19 Oct 2024 18:13) (98% - 100%)    Parameters below as of 19 Oct 2024 18:13  Patient On (Oxygen Delivery Method): room air    physical exam:  gen: A&O, NAD  abd: gravid, soft, non tender, no guarding, no rebound, right sided CVA tenderness +  GYN: No fluid or blood noted  extrem: soft, non tender, no swelling/varicosities/or color changes noted  TOCO no ctxs, FHR 145bpm    A/P  28yo  at 21w3d, HD #1 admitted for pain management of right kidney stone, stable  - IV fluids  - IV tylenol  - morphine PRN  - straining urine  - fetal check Q12 hrs  - awaiting urology consult, urology team is aware of consult request  - diet as tolerated  - venodynes while in bed  - transfer to antepartum unit as per Dr. Hanley  - damián done  - Dr. Nash aware
f/u note:     spoken with uro team- STEFANIE Piedra - as per PA she spoke with dr Gamez- reviewed the renal sonogram findings; at this time noted stone 0.9cm <     from: US Renal (10.18.24 @ 15:42) >    Additional 0.9 cm non-obstructing stone in the upper pole of the right   kidney.    - as per uro team per STEFANIE Piedra - dr Gamez recommends CT scan ; dw uro team pt just received iv morphine for pain management and pt is resting; will discuss with the primary OB attending in regards to the recommendation
f/u note: pregnant with non obstructive kidney stone noted yesterday by renal sonogram     spoke to uro team: for consult    28yo pregnant at 21 3/7weeks returned 3rd time for flank pain and now with vomiting x2 today    denies diarrhea    denies fever chills    uro team is coming to see the pt and discuss further management
 bpm  no CTX on toco
Radiology contacted PA I regards to concerns with the order for CT renal stone hunt as patient is 21w4d. Discussed with Radiology recommendations per Renal sono and urology. Radiology recommends repeat renal sono or MRI for further evaluation, unless there is urgent need for CT.     Discussed with Dr. Nash, will proceed with repeat Renal ultrasound

## 2024-10-23 ENCOUNTER — OUTPATIENT (OUTPATIENT)
Dept: OUTPATIENT SERVICES | Facility: HOSPITAL | Age: 29
LOS: 1 days | End: 2024-10-23
Payer: MEDICAID

## 2024-10-23 VITALS
RESPIRATION RATE: 17 BRPM | OXYGEN SATURATION: 97 % | SYSTOLIC BLOOD PRESSURE: 103 MMHG | TEMPERATURE: 98 F | HEART RATE: 85 BPM | DIASTOLIC BLOOD PRESSURE: 56 MMHG

## 2024-10-23 DIAGNOSIS — O26.899 OTHER SPECIFIED PREGNANCY RELATED CONDITIONS, UNSPECIFIED TRIMESTER: ICD-10-CM

## 2024-10-23 LAB
APPEARANCE UR: ABNORMAL
BASOPHILS # BLD AUTO: 0.02 K/UL — SIGNIFICANT CHANGE UP (ref 0–0.2)
BASOPHILS NFR BLD AUTO: 0.2 % — SIGNIFICANT CHANGE UP (ref 0–2)
BILIRUB UR-MCNC: NEGATIVE — SIGNIFICANT CHANGE UP
COLOR SPEC: ABNORMAL
DIFF PNL FLD: ABNORMAL
EOSINOPHIL # BLD AUTO: 0.08 K/UL — SIGNIFICANT CHANGE UP (ref 0–0.5)
EOSINOPHIL NFR BLD AUTO: 1 % — SIGNIFICANT CHANGE UP (ref 0–6)
GLUCOSE UR QL: NEGATIVE MG/DL — SIGNIFICANT CHANGE UP
HCT VFR BLD CALC: 35.9 % — SIGNIFICANT CHANGE UP (ref 34.5–45)
HGB BLD-MCNC: 12.2 G/DL — SIGNIFICANT CHANGE UP (ref 11.5–15.5)
IMM GRANULOCYTES NFR BLD AUTO: 0.4 % — SIGNIFICANT CHANGE UP (ref 0–0.9)
KETONES UR-MCNC: NEGATIVE MG/DL — SIGNIFICANT CHANGE UP
LEUKOCYTE ESTERASE UR-ACNC: ABNORMAL
LYMPHOCYTES # BLD AUTO: 1.41 K/UL — SIGNIFICANT CHANGE UP (ref 1–3.3)
LYMPHOCYTES # BLD AUTO: 17.5 % — SIGNIFICANT CHANGE UP (ref 13–44)
MCHC RBC-ENTMCNC: 32.3 PG — SIGNIFICANT CHANGE UP (ref 27–34)
MCHC RBC-ENTMCNC: 34 GM/DL — SIGNIFICANT CHANGE UP (ref 32–36)
MCV RBC AUTO: 95 FL — SIGNIFICANT CHANGE UP (ref 80–100)
MONOCYTES # BLD AUTO: 0.49 K/UL — SIGNIFICANT CHANGE UP (ref 0–0.9)
MONOCYTES NFR BLD AUTO: 6.1 % — SIGNIFICANT CHANGE UP (ref 2–14)
NEUTROPHILS # BLD AUTO: 6.01 K/UL — SIGNIFICANT CHANGE UP (ref 1.8–7.4)
NEUTROPHILS NFR BLD AUTO: 74.8 % — SIGNIFICANT CHANGE UP (ref 43–77)
NITRITE UR-MCNC: NEGATIVE — SIGNIFICANT CHANGE UP
NRBC # BLD: 0 /100 WBCS — SIGNIFICANT CHANGE UP (ref 0–0)
PH UR: 8 — SIGNIFICANT CHANGE UP (ref 5–8)
PLATELET # BLD AUTO: 216 K/UL — SIGNIFICANT CHANGE UP (ref 150–400)
PROT UR-MCNC: ABNORMAL MG/DL
RBC # BLD: 3.78 M/UL — LOW (ref 3.8–5.2)
RBC # FLD: 11.5 % — SIGNIFICANT CHANGE UP (ref 10.3–14.5)
SP GR SPEC: 1.01 — SIGNIFICANT CHANGE UP (ref 1–1.03)
UROBILINOGEN FLD QL: 1 MG/DL — SIGNIFICANT CHANGE UP (ref 0.2–1)
WBC # BLD: 8.04 K/UL — SIGNIFICANT CHANGE UP (ref 3.8–10.5)
WBC # FLD AUTO: 8.04 K/UL — SIGNIFICANT CHANGE UP (ref 3.8–10.5)

## 2024-10-23 PROCEDURE — 85025 COMPLETE CBC W/AUTO DIFF WBC: CPT

## 2024-10-23 PROCEDURE — 99213 OFFICE O/P EST LOW 20 MIN: CPT

## 2024-10-23 PROCEDURE — 81001 URINALYSIS AUTO W/SCOPE: CPT

## 2024-10-23 PROCEDURE — 36415 COLL VENOUS BLD VENIPUNCTURE: CPT

## 2024-10-23 PROCEDURE — 99284 EMERGENCY DEPT VISIT MOD MDM: CPT

## 2024-10-23 PROCEDURE — G0463: CPT

## 2024-10-23 RX ORDER — TAMSULOSIN HCL 0.4 MG
1 CAPSULE ORAL
Qty: 7 | Refills: 0
Start: 2024-10-23 | End: 2024-10-29

## 2024-10-23 RX ORDER — ACETAMINOPHEN 500 MG
1000 TABLET ORAL ONCE
Refills: 0 | Status: DISCONTINUED | OUTPATIENT
Start: 2024-10-23 | End: 2024-11-06

## 2024-10-23 NOTE — OB PROVIDER TRIAGE NOTE - NSOBPROVIDERNOTE_OBGYN_ALL_OB_FT
a/p siup @ 22weeks with R  cvat, known kidney stone  Lr bolus  declined pain medication  cbc/ua   continue to monitor  Dr Cavazos notified, awaiting further instructions  nst reviewed by nisha Alicea attending a/p siup @ 22weeks with R  cvat, known kidney stone  Lr bolus  declined pain medication  cbc/ua   continue to monitor  Dr Cavazos notified, awaiting further instructions  nst reviewed by nisha Alicea attending    addendum for 1pm  h/h stable  per Dr Cavazos, obtain urology consult a/p siup @ 22weeks with R  cvat, known kidney stone  Lr bolus  declined pain medication  cbc/ua   continue to monitor  Dr Cavazos notified, awaiting further instructions  nst reviewed by nisha Alicea attending    addendum for 1pm  h/h stable  per Dr Cavazos, obtain urology consult    addendum for 315pm  Urology- no acute intervention, recommend flomax to aid in expulsion  Dr Cavazos notified of urology recommendation  patient to discharged home with precautions  all questions and concerns addressed

## 2024-10-23 NOTE — OB RN TRIAGE NOTE - NSLDARRIVAL_OBGYN_ALL_OB_DIFF_HHMM
Quality 128: Preventive Care And Screening: Body Mass Index (Bmi) Screening And Follow-Up Plan: BMI documented as out of range, follow up plan not documented, reason not otherwise specified. Quality 226: Preventive Care And Screening: Tobacco Use: Screening And Cessation Intervention: Patient screened for tobacco use and is an ex/non-smoker Quality 130: Documentation Of Current Medications In The Medical Record: Current Medications Documented Detail Level: Zone Quality 110: Preventive Care And Screening: Influenza Immunization: Influenza Immunization not Administered because Patient Refused. 4 Hour(s) 20 Minute(s)

## 2024-10-23 NOTE — OB PROVIDER TRIAGE NOTE - HISTORY OF PRESENT ILLNESS
30 yo  @22 weeks presents to returns to triage c/o right sided flank pain and abd pain. Patient with known kidneystone, admitted 10/20-10/22/for pain managment  Patient states pain is less however still persist.    Patient admits to fetal movement  denies vaginal bleeding, leakage of fluid or contractions pain  pnc with , last visit, uncomplicated per patient    denies smoking, alcohol use or recreational drug use   PNC with Dr. Cavazos    OBHx:   1) SAB with D&C at 16wks, 2022  2) SAB with pills at 4wks, 10/2022  3) , 2023, 6lb 16 oz, on Progesterone for first 3 months    GYNHx: Reports no hx of STIs, fibroids or cysts. PAP LGSIL    SurgHx: Reports no other surgery    MedHx: Denies    Medication: PNV, aspirin    FamHx: Father with heart disease and DM    Shx: Denies ETOH, drugs, smoking, anxiety, depression. Lives with spouse and daughter 30 yo  @22 weeks presents to returns to triage c/o right sided flank pain and abd pain. Patient with known kidney stone, admitted 10/20-10/22/for pain management  Patient states pain is less however still persist.   Patient admits to fetal movement  denies vaginal bleeding, leakage of fluid or contractions pain  denies smoking, alcohol use or recreational drug use   PNC with Dr. Cavazos    OBHx:   1) SAB with D&C at 16wks, 2022  2) SAB with pills at 4wks, 10/2022  3) , 2023, 6lb 16 oz, on Progesterone for first 3 months    GYNHx: Reports no hx of STIs, fibroids or cysts. PAP LGSIL    SurgHx: Reports no other surgery    MedHx: Denies    Medication: PNV, aspirin    FamHx: Father with heart disease and DM    Shx: Denies ETOH, drugs, smoking, anxiety, depression. Lives with spouse and daughter

## 2024-10-23 NOTE — OB PROVIDER TRIAGE NOTE - NSHPLABSRESULTS_GEN_ALL_CORE
12.2   8.04  )-----------( 216      ( 23 Oct 2024 11:23 )             35.9   Urinalysis Basic - ( 23 Oct 2024 11:23 )    Color: Orange / Appearance: Cloudy / S.011 / pH: x  Gluc: x / Ketone: Negative mg/dL  / Bili: Negative / Urobili: 1.0 mg/dL   Blood: x / Protein: Trace mg/dL / Nitrite: Negative   Leuk Esterase: Trace / RBC: Too Numerous to count /HPF / WBC 2 /HPF   Sq Epi: x / Non Sq Epi: x / Bacteria: Moderate /HPF

## 2024-10-23 NOTE — OB PROVIDER TRIAGE NOTE - ADDITIONAL INSTRUCTIONS
a/p siup @ 22weeks with known kidney stone, c/o flank pain. hemodynamically stable,  testing reassuring  - d/c home  - ERX sent for flomax  -  labor precautions reviewed  - patient to increase hydration  - tylenol prn pain  - patient to return to L+D if any worsening pain, vaginal bleeding or contraction pain  - f/u with MFM on 10/25/24 as scheduled  all questions concerns addressed  d/w Dr Macy desai reviewed by nisha Souza attending

## 2024-10-23 NOTE — OB PROVIDER TRIAGE NOTE - NSHPPHYSICALEXAM_GEN_ALL_CORE
gen:  well appearing  Vital Signs Last 24 Hrs  T(C): 36.7 (23 Oct 2024 11:05), Max: 36.7 (23 Oct 2024 10:26)  T(F): 98.1 (23 Oct 2024 11:05), Max: 98.1 (23 Oct 2024 11:05)  HR: 85 (23 Oct 2024 11:05) (85 - 100)  BP: 103/56 (23 Oct 2024 11:05) (103/56 - 120/69)  BP(mean): --  RR: 17 (23 Oct 2024 11:05) (17 - 18)  SpO2: 97% (23 Oct 2024 11:05) (97% - 100%)    Parameters below as of 23 Oct 2024 11:05  Patient On (Oxygen Delivery Method): room air    abd: gravid, soft, non tender  back  +RCvat no left  sve def    fh 150s  no contractions

## 2024-10-24 ENCOUNTER — NON-APPOINTMENT (OUTPATIENT)
Age: 29
End: 2024-10-24

## 2024-10-24 ENCOUNTER — APPOINTMENT (OUTPATIENT)
Dept: OBGYN | Facility: CLINIC | Age: 29
End: 2024-10-24
Payer: COMMERCIAL

## 2024-10-24 VITALS
OXYGEN SATURATION: 99 % | SYSTOLIC BLOOD PRESSURE: 125 MMHG | HEART RATE: 97 BPM | WEIGHT: 168 LBS | DIASTOLIC BLOOD PRESSURE: 72 MMHG | BODY MASS INDEX: 29.76 KG/M2

## 2024-10-24 PROCEDURE — 99213 OFFICE O/P EST LOW 20 MIN: CPT

## 2024-10-25 ENCOUNTER — APPOINTMENT (OUTPATIENT)
Dept: ANTEPARTUM | Facility: CLINIC | Age: 29
End: 2024-10-25
Payer: COMMERCIAL

## 2024-10-25 ENCOUNTER — ASOB RESULT (OUTPATIENT)
Age: 29
End: 2024-10-25

## 2024-10-25 DIAGNOSIS — O99.891 OTHER SPECIFIED DISEASES AND CONDITIONS COMPLICATING PREGNANCY: ICD-10-CM

## 2024-10-25 DIAGNOSIS — Z3A.22 22 WEEKS GESTATION OF PREGNANCY: ICD-10-CM

## 2024-10-25 DIAGNOSIS — N20.0 CALCULUS OF KIDNEY: ICD-10-CM

## 2024-10-25 DIAGNOSIS — O09.292 SUPERVISION OF PREGNANCY WITH OTHER POOR REPRODUCTIVE OR OBSTETRIC HISTORY, SECOND TRIMESTER: ICD-10-CM

## 2024-10-25 PROCEDURE — 76815 OB US LIMITED FETUS(S): CPT

## 2024-10-25 PROCEDURE — 76817 TRANSVAGINAL US OBSTETRIC: CPT

## 2024-11-01 ENCOUNTER — APPOINTMENT (OUTPATIENT)
Dept: ANTEPARTUM | Facility: CLINIC | Age: 29
End: 2024-11-01
Payer: COMMERCIAL

## 2024-11-01 ENCOUNTER — ASOB RESULT (OUTPATIENT)
Age: 29
End: 2024-11-01

## 2024-11-01 PROCEDURE — 76817 TRANSVAGINAL US OBSTETRIC: CPT

## 2024-11-01 PROCEDURE — 76816 OB US FOLLOW-UP PER FETUS: CPT

## 2024-11-12 ENCOUNTER — OUTPATIENT (OUTPATIENT)
Dept: OUTPATIENT SERVICES | Facility: HOSPITAL | Age: 29
LOS: 1 days | End: 2024-11-12
Payer: MEDICAID

## 2024-11-12 VITALS
HEART RATE: 91 BPM | OXYGEN SATURATION: 98 % | TEMPERATURE: 98 F | RESPIRATION RATE: 17 BRPM | DIASTOLIC BLOOD PRESSURE: 56 MMHG | SYSTOLIC BLOOD PRESSURE: 109 MMHG

## 2024-11-12 DIAGNOSIS — O26.899 OTHER SPECIFIED PREGNANCY RELATED CONDITIONS, UNSPECIFIED TRIMESTER: ICD-10-CM

## 2024-11-12 DIAGNOSIS — Z98.891 HISTORY OF UTERINE SCAR FROM PREVIOUS SURGERY: Chronic | ICD-10-CM

## 2024-11-12 PROBLEM — G43.909 MIGRAINE, UNSPECIFIED, NOT INTRACTABLE, WITHOUT STATUS MIGRAINOSUS: Chronic | Status: INACTIVE | Noted: 2020-03-05 | Resolved: 2024-11-12

## 2024-11-12 LAB — AMNISURE ROM (RUPTURE OF MEMBRANES): NEGATIVE — SIGNIFICANT CHANGE UP

## 2024-11-12 PROCEDURE — 99212 OFFICE O/P EST SF 10 MIN: CPT | Mod: 25

## 2024-11-12 PROCEDURE — 84112 EVAL AMNIOTIC FLUID PROTEIN: CPT

## 2024-11-12 PROCEDURE — 59025 FETAL NON-STRESS TEST: CPT | Mod: 26

## 2024-11-12 PROCEDURE — 59025 FETAL NON-STRESS TEST: CPT

## 2024-11-12 PROCEDURE — G0463: CPT

## 2024-11-12 NOTE — OB PROVIDER TRIAGE NOTE - NSOBPROVIDERNOTE_OBGYN_ALL_OB_FT
29 y/o  at 24 weeks 6 days (LMP: 2024, YENY: 2025) presents for r/o ROM. VSS, NST reactive    - Close maternal and fetal monitoring  - No evidence of ROM on exam  - Amnisure negative  - Discussed with Dr. Cavazos via telephone  - NST reviewed with Dr. Brizuela    Addendum  - Amnisure negative  - Pt stable for discharge  - Labor precautions discussed with pt  - Discussed with Dr. Cavazos

## 2024-11-12 NOTE — OB RN TRIAGE NOTE - NSNURSINGINSTR_OBGYN_ALL_OB_FT
Pt cleared for discharge as per STEFANIE Barnes. Pt d/c to home verbalized understanding of d/c instructions. Educated about fetal kick counts and pre-term labor precautions. Reinforced teachings with literature. Safety and comfort maintained. Instructed to follow up in office as scheduled.

## 2024-11-12 NOTE — OB RN TRIAGE NOTE - NS_OBGYNHISTORY_OBGYN_ALL_OB_FT
1995 Girl FT   SAB X2 Complete  1996 Girl FT Uncomplicated   SAB X2 Complete 4wks/6wks  R Kidney Stone  G6PD [place]

## 2024-11-12 NOTE — OB PROVIDER TRIAGE NOTE - PLAN OF CARE
- Amnisure negative  - NST Reactive  - VSS  - Pt stable for discharge  - Labor precautions discussed with pt  - Discussed with Dr. Cavazos  - NST reviewed with Dr. Brizuela

## 2024-11-12 NOTE — OB PROVIDER TRIAGE NOTE - NSHPPHYSICALEXAM_GEN_ALL_CORE
Vital Signs Last 24 Hrs  T(C): 36.7 (12 Nov 2024 09:12), Max: 36.8 (12 Nov 2024 08:18)  T(F): 98.1 (12 Nov 2024 09:12), Max: 98.2 (12 Nov 2024 08:18)  HR: 91 (12 Nov 2024 09:12) (91 - 91)  BP: 109/56 (12 Nov 2024 09:12) (109/56 - 115/65)  BP(mean): 79 (12 Nov 2024 08:18) (79 - 79)  RR: 17 (12 Nov 2024 09:12) (17 - 17)  SpO2: 98% (12 Nov 2024 09:12) (98% - 100%)    Parameters below as of 12 Nov 2024 09:12  Patient On (Oxygen Delivery Method): room air    Physical Exam:   General: A & O x 3, in NAD  Abdomen: Gravid uterus, soft, nontender  Extremities: Nontender, no swelling, no discoloration  SSE: White physiologic discharge visable, No evidence of pooling fluid, no evidence of active bleeding.   SVE: closed, soft    FHT:   Baseline 140, moderate variability  [place]: no contractions

## 2024-11-12 NOTE — OB RN TRIAGE NOTE - NS_RSVVACCINERECEIVED_OBGYN_ALL_OB
No Observation and assessment/Rehabilitation services/Teaching and training/Wound care and assessment

## 2024-11-12 NOTE — OB PROVIDER TRIAGE NOTE - ADDITIONAL INSTRUCTIONS
Continue taking prenatal vitamins   If your water breaks, you develop contractions, or note vaginal bleeding, return to delivery room.  Check the baby's movements daily with fetal kick count  Follow up with your doctor as scheduled

## 2024-11-12 NOTE — OB PROVIDER TRIAGE NOTE - HISTORY OF PRESENT ILLNESS
31 y/o  at 24 weeks 6 days (LMP: 2024, YENY: 2025) presents to triage with complaint of excessive white vaginal discharge and intermittent abdominal cramps for the past 2 days.   Patient has + FM, denies gush of fluid, denies Vaginal bleeding, denies contractions.   Denies headache, vision changes, chest pain, SOB, fever, chills.     PNC: Dr. Cavazos, complicated by short cervix (US on  showed cervical length of 2.1), on vaginal progesterone nightly   OBHx:  in  at 37 weeks. SAB at 16 weeks 3/2020 with D & C. SAB at 4 weeks 10/2020.   GYNHx: denies h/o fibroids, cysts, STDs  PMHx: denies  PSHx: Laparoscopy in  for uterine septum removal   SocialHx: denies alcohol, drugs and smoking  Allergies: denies  Medications: PNV, Aspirin

## 2024-11-14 DIAGNOSIS — O99.891 OTHER SPECIFIED DISEASES AND CONDITIONS COMPLICATING PREGNANCY: ICD-10-CM

## 2024-11-14 DIAGNOSIS — Z03.71 ENCOUNTER FOR SUSPECTED PROBLEM WITH AMNIOTIC CAVITY AND MEMBRANE RULED OUT: ICD-10-CM

## 2024-11-14 DIAGNOSIS — N89.8 OTHER SPECIFIED NONINFLAMMATORY DISORDERS OF VAGINA: ICD-10-CM

## 2024-11-14 DIAGNOSIS — Z3A.24 24 WEEKS GESTATION OF PREGNANCY: ICD-10-CM

## 2024-11-14 DIAGNOSIS — O09.292 SUPERVISION OF PREGNANCY WITH OTHER POOR REPRODUCTIVE OR OBSTETRIC HISTORY, SECOND TRIMESTER: ICD-10-CM

## 2024-11-18 ENCOUNTER — NON-APPOINTMENT (OUTPATIENT)
Age: 29
End: 2024-11-18

## 2024-11-21 ENCOUNTER — LABORATORY RESULT (OUTPATIENT)
Age: 29
End: 2024-11-21

## 2024-11-22 ENCOUNTER — APPOINTMENT (OUTPATIENT)
Dept: OBGYN | Facility: CLINIC | Age: 29
End: 2024-11-22
Payer: COMMERCIAL

## 2024-11-22 VITALS
SYSTOLIC BLOOD PRESSURE: 107 MMHG | WEIGHT: 176 LBS | HEART RATE: 89 BPM | DIASTOLIC BLOOD PRESSURE: 72 MMHG | OXYGEN SATURATION: 100 % | BODY MASS INDEX: 31.18 KG/M2

## 2024-11-22 PROCEDURE — 99213 OFFICE O/P EST LOW 20 MIN: CPT | Mod: TH

## 2024-12-05 ENCOUNTER — APPOINTMENT (OUTPATIENT)
Dept: ANTEPARTUM | Facility: CLINIC | Age: 29
End: 2024-12-05
Payer: COMMERCIAL

## 2024-12-05 ENCOUNTER — ASOB RESULT (OUTPATIENT)
Age: 29
End: 2024-12-05

## 2024-12-05 PROBLEM — Z78.9 OTHER SPECIFIED HEALTH STATUS: Chronic | Status: ACTIVE | Noted: 2024-11-12

## 2024-12-05 PROCEDURE — 76816 OB US FOLLOW-UP PER FETUS: CPT

## 2024-12-10 ENCOUNTER — NON-APPOINTMENT (OUTPATIENT)
Age: 29
End: 2024-12-10

## 2024-12-17 ENCOUNTER — OUTPATIENT (OUTPATIENT)
Dept: OUTPATIENT SERVICES | Facility: HOSPITAL | Age: 29
LOS: 1 days | End: 2024-12-17
Payer: COMMERCIAL

## 2024-12-17 VITALS
DIASTOLIC BLOOD PRESSURE: 56 MMHG | RESPIRATION RATE: 16 BRPM | TEMPERATURE: 98 F | SYSTOLIC BLOOD PRESSURE: 109 MMHG | OXYGEN SATURATION: 99 % | HEART RATE: 90 BPM

## 2024-12-17 DIAGNOSIS — O26.899 OTHER SPECIFIED PREGNANCY RELATED CONDITIONS, UNSPECIFIED TRIMESTER: ICD-10-CM

## 2024-12-17 DIAGNOSIS — Z98.891 HISTORY OF UTERINE SCAR FROM PREVIOUS SURGERY: Chronic | ICD-10-CM

## 2024-12-17 PROCEDURE — 59025 FETAL NON-STRESS TEST: CPT

## 2024-12-17 PROCEDURE — 99283 EMERGENCY DEPT VISIT LOW MDM: CPT

## 2024-12-17 PROCEDURE — 99213 OFFICE O/P EST LOW 20 MIN: CPT | Mod: 25

## 2024-12-17 PROCEDURE — G0463: CPT

## 2024-12-17 PROCEDURE — 59025 FETAL NON-STRESS TEST: CPT | Mod: 26

## 2024-12-17 NOTE — OB RN TRIAGE NOTE - MENTAL HEALTH CONDITIONS/SYMPTOMS, PROFILE
[place] Counseling: I discussed with the patient the risks of ixekizumab including but not limited to immunosuppression, serious infections, worsening of inflammatory bowel disease and drug reactions.  The patient understands that monitoring is required including a PPD at baseline and must alert us or the primary physician if symptoms of infection or other concerning signs are noted. none

## 2024-12-17 NOTE — OB PROVIDER TRIAGE NOTE - ADDITIONAL INSTRUCTIONS
- Continue taking prenatal vitamins   - If your water breaks, you develop contractions, or note increased vaginal bleeding return to delivery room.  - Check the baby's movements daily with fetal kick count  - Followup with OBGYN in 1-2 days  - Recommend rest for the next 2 days

## 2024-12-17 NOTE — OB PROVIDER TRIAGE NOTE - PLAN OF CARE
- NST appropriate for gestational age  - VSS  - Pain likely muscular in nature  - Patient observed x 1 hour  - No evidence of  labor  - Patient stable for discharge  -  labor precautions given  - Recommend rest x 2 days, work note given  - Follow up with OBGYN in 1-2 days  - Discussed with Dr. Nash (covering for Dr. Cavazos)  - NST reviewed with Dr. Dunlap (In house attending)

## 2024-12-17 NOTE — OB PROVIDER TRIAGE NOTE - HISTORY OF PRESENT ILLNESS
28 y/o  at 29 weeks 6 days (LMP: 2024 , YENY:) presents to triage with complaint of abdominal pressure for the past few days and vaginal spotting earlier today. Patient states pain is on her lower abdomen, worse with activity and improves with rest, pain is aching in nature. Pain is 5/10.   Patient has + FM, denies LOF, denies contractions  Denies headache, vision changes, chest pain, SOB, fever, chills.     PNC: Dr. Cavazos, c/b short cervix, on vaginal progesterone nightly.   OBHx: SAB x 2 in , FT  in   GYNHx: denies h/o fibroids, cysts, STDs  PMHx: denies  PSHx: Laparoscopy for uterine septum removal  SocialHx: denies alcohol, drugs and smoking  Allergies: denies  Medications: PNV, Aspirin

## 2024-12-17 NOTE — OB PROVIDER TRIAGE NOTE - NSHPPHYSICALEXAM_GEN_ALL_CORE
Vital Signs Last 24 Hrs  T(C): 36.4 (17 Dec 2024 20:04), Max: 36.4 (17 Dec 2024 18:14)  T(F): 97.5 (17 Dec 2024 20:04), Max: 97.6 (17 Dec 2024 18:14)  HR: 90 (17 Dec 2024 20:04) (90 - 93)  BP: 109/56 (17 Dec 2024 20:04) (109/56 - 113/71)  RR: 16 (17 Dec 2024 20:04) (16 - 18)  SpO2: 99% (17 Dec 2024 20:04) (98% - 99%)    Parameters below as of 17 Dec 2024 20:04  Patient On (Oxygen Delivery Method): room air    Physical Exam:  General: A & O x 3, in NAD  Abdomen: Gravid uterus, soft, nontender  Extremities: nontender, no swelling or discoloration  SVE: closed, soft, no blood visualized    FHT:   Baseline 140  Tracing appropriate for gestational age  [place]: no contractions

## 2024-12-17 NOTE — OB PROVIDER TRIAGE NOTE - NSOBPROVIDERNOTE_OBGYN_ALL_OB_FT
28 y/o  at 29 weeks 6 days (LMP: 2024 , YENY:) presents to triage with complaint of abdominal pressure for the past few days and vaginal spotting earlier today.    - NST appropriate for gestational age  - VSS  - Pain likely muscular in nature  - Patient observed x 1 hour  - No evidence of  labor  - Patient stable for discharge  -  labor precautions given  - Recommend rest x 2 days  - Follow up with OBGYN in 1-2 days  - Discussed with Dr. Nash (covering for Dr. Cavazos)  - NST reviewed with Dr. Dunlap (In house attending)

## 2024-12-18 PROBLEM — Z78.9 OTHER SPECIFIED HEALTH STATUS: Chronic | Status: INACTIVE | Noted: 2024-11-12 | Resolved: 2024-12-17

## 2024-12-19 ENCOUNTER — APPOINTMENT (OUTPATIENT)
Dept: OBGYN | Facility: CLINIC | Age: 29
End: 2024-12-19
Payer: COMMERCIAL

## 2024-12-19 VITALS
SYSTOLIC BLOOD PRESSURE: 110 MMHG | WEIGHT: 177 LBS | BODY MASS INDEX: 31.35 KG/M2 | OXYGEN SATURATION: 99 % | HEART RATE: 96 BPM | DIASTOLIC BLOOD PRESSURE: 73 MMHG

## 2024-12-19 DIAGNOSIS — O26.873 CERVICAL SHORTENING, THIRD TRIMESTER: ICD-10-CM

## 2024-12-19 DIAGNOSIS — O26.893 OTHER SPECIFIED PREGNANCY RELATED CONDITIONS, THIRD TRIMESTER: ICD-10-CM

## 2024-12-19 DIAGNOSIS — O26.853 SPOTTING COMPLICATING PREGNANCY, THIRD TRIMESTER: ICD-10-CM

## 2024-12-19 DIAGNOSIS — O09.293 SUPERVISION OF PREGNANCY WITH OTHER POOR REPRODUCTIVE OR OBSTETRIC HISTORY, THIRD TRIMESTER: ICD-10-CM

## 2024-12-19 DIAGNOSIS — R10.30 LOWER ABDOMINAL PAIN, UNSPECIFIED: ICD-10-CM

## 2024-12-19 DIAGNOSIS — Z3A.29 29 WEEKS GESTATION OF PREGNANCY: ICD-10-CM

## 2024-12-19 PROBLEM — Z78.9 OTHER SPECIFIED HEALTH STATUS: Chronic | Status: ACTIVE | Noted: 2024-12-17

## 2024-12-19 PROCEDURE — 99213 OFFICE O/P EST LOW 20 MIN: CPT | Mod: TH

## 2025-01-02 ENCOUNTER — APPOINTMENT (OUTPATIENT)
Dept: OBGYN | Facility: CLINIC | Age: 30
End: 2025-01-02
Payer: COMMERCIAL

## 2025-01-02 VITALS
SYSTOLIC BLOOD PRESSURE: 114 MMHG | WEIGHT: 180 LBS | OXYGEN SATURATION: 100 % | DIASTOLIC BLOOD PRESSURE: 71 MMHG | BODY MASS INDEX: 31.89 KG/M2 | HEART RATE: 96 BPM

## 2025-01-02 PROCEDURE — 99213 OFFICE O/P EST LOW 20 MIN: CPT | Mod: TH

## 2025-01-03 ENCOUNTER — APPOINTMENT (OUTPATIENT)
Dept: ANTEPARTUM | Facility: CLINIC | Age: 30
End: 2025-01-03
Payer: COMMERCIAL

## 2025-01-03 ENCOUNTER — ASOB RESULT (OUTPATIENT)
Age: 30
End: 2025-01-03

## 2025-01-03 ENCOUNTER — LABORATORY RESULT (OUTPATIENT)
Age: 30
End: 2025-01-03

## 2025-01-03 PROCEDURE — 76816 OB US FOLLOW-UP PER FETUS: CPT

## 2025-01-03 PROCEDURE — 76817 TRANSVAGINAL US OBSTETRIC: CPT

## 2025-01-03 PROCEDURE — 76819 FETAL BIOPHYS PROFIL W/O NST: CPT | Mod: 59

## 2025-01-10 ENCOUNTER — NON-APPOINTMENT (OUTPATIENT)
Age: 30
End: 2025-01-10

## 2025-01-16 ENCOUNTER — APPOINTMENT (OUTPATIENT)
Dept: OBGYN | Facility: CLINIC | Age: 30
End: 2025-01-16
Payer: COMMERCIAL

## 2025-01-16 VITALS
OXYGEN SATURATION: 99 % | WEIGHT: 181 LBS | SYSTOLIC BLOOD PRESSURE: 124 MMHG | HEART RATE: 103 BPM | BODY MASS INDEX: 32.06 KG/M2 | DIASTOLIC BLOOD PRESSURE: 77 MMHG

## 2025-01-16 PROCEDURE — 99213 OFFICE O/P EST LOW 20 MIN: CPT | Mod: TH

## 2025-01-20 ENCOUNTER — OUTPATIENT (OUTPATIENT)
Dept: OUTPATIENT SERVICES | Facility: HOSPITAL | Age: 30
LOS: 1 days | End: 2025-01-20
Payer: COMMERCIAL

## 2025-01-20 VITALS
RESPIRATION RATE: 19 BRPM | HEART RATE: 120 BPM | OXYGEN SATURATION: 99 % | TEMPERATURE: 99 F | DIASTOLIC BLOOD PRESSURE: 54 MMHG | SYSTOLIC BLOOD PRESSURE: 104 MMHG

## 2025-01-20 DIAGNOSIS — O26.899 OTHER SPECIFIED PREGNANCY RELATED CONDITIONS, UNSPECIFIED TRIMESTER: ICD-10-CM

## 2025-01-20 DIAGNOSIS — Z98.891 HISTORY OF UTERINE SCAR FROM PREVIOUS SURGERY: Chronic | ICD-10-CM

## 2025-01-20 LAB
APPEARANCE UR: CLEAR — SIGNIFICANT CHANGE UP
BILIRUB UR-MCNC: NEGATIVE — SIGNIFICANT CHANGE UP
COLOR SPEC: YELLOW — SIGNIFICANT CHANGE UP
DIFF PNL FLD: ABNORMAL
FLUAV AG NPH QL: DETECTED
FLUBV AG NPH QL: SIGNIFICANT CHANGE UP
GLUCOSE UR QL: NEGATIVE MG/DL — SIGNIFICANT CHANGE UP
KETONES UR-MCNC: NEGATIVE MG/DL — SIGNIFICANT CHANGE UP
LEUKOCYTE ESTERASE UR-ACNC: ABNORMAL
NITRITE UR-MCNC: NEGATIVE — SIGNIFICANT CHANGE UP
PH UR: 7 — SIGNIFICANT CHANGE UP (ref 5–8)
PROT UR-MCNC: NEGATIVE MG/DL — SIGNIFICANT CHANGE UP
RSV RNA NPH QL NAA+NON-PROBE: SIGNIFICANT CHANGE UP
SARS-COV-2 RNA SPEC QL NAA+PROBE: SIGNIFICANT CHANGE UP
SP GR SPEC: 1.01 — SIGNIFICANT CHANGE UP (ref 1–1.03)
UROBILINOGEN FLD QL: 1 MG/DL — SIGNIFICANT CHANGE UP (ref 0.2–1)

## 2025-01-20 PROCEDURE — 76819 FETAL BIOPHYS PROFIL W/O NST: CPT

## 2025-01-20 PROCEDURE — 81001 URINALYSIS AUTO W/SCOPE: CPT

## 2025-01-20 PROCEDURE — 76815 OB US LIMITED FETUS(S): CPT | Mod: 26

## 2025-01-20 PROCEDURE — 76819 FETAL BIOPHYS PROFIL W/O NST: CPT | Mod: 26

## 2025-01-20 PROCEDURE — 99283 EMERGENCY DEPT VISIT LOW MDM: CPT

## 2025-01-20 PROCEDURE — 96361 HYDRATE IV INFUSION ADD-ON: CPT

## 2025-01-20 PROCEDURE — 0241U: CPT

## 2025-01-20 PROCEDURE — 93005 ELECTROCARDIOGRAM TRACING: CPT

## 2025-01-20 PROCEDURE — G0463: CPT

## 2025-01-20 PROCEDURE — 76815 OB US LIMITED FETUS(S): CPT

## 2025-01-20 PROCEDURE — 87637 SARSCOV2&INF A&B&RSV AMP PRB: CPT

## 2025-01-20 PROCEDURE — 59025 FETAL NON-STRESS TEST: CPT

## 2025-01-20 PROCEDURE — 96360 HYDRATION IV INFUSION INIT: CPT

## 2025-01-20 RX ORDER — SODIUM CHLORIDE 9 G/ML
1000 INJECTION, SOLUTION INTRAVENOUS ONCE
Refills: 0 | Status: COMPLETED | OUTPATIENT
Start: 2025-01-20 | End: 2025-01-20

## 2025-01-20 RX ORDER — ACETAMINOPHEN 160 MG/5ML
975 SUSPENSION ORAL ONCE
Refills: 0 | Status: COMPLETED | OUTPATIENT
Start: 2025-01-20 | End: 2025-01-20

## 2025-01-20 RX ORDER — OSELTAMIVIR PHOSPHATE 75 MG/1
1 CAPSULE ORAL
Qty: 8 | Refills: 0
Start: 2025-01-20 | End: 2025-01-23

## 2025-01-20 RX ORDER — OSELTAMIVIR PHOSPHATE 75 MG/1
75 CAPSULE ORAL
Refills: 0 | Status: DISCONTINUED | OUTPATIENT
Start: 2025-01-20 | End: 2025-02-04

## 2025-01-20 RX ORDER — BACTERIOSTATIC SODIUM CHLORIDE 0.9 %
3 VIAL (ML) INJECTION EVERY 8 HOURS
Refills: 0 | Status: DISCONTINUED | OUTPATIENT
Start: 2025-01-20 | End: 2025-02-04

## 2025-01-20 RX ADMIN — SODIUM CHLORIDE 1000 MILLILITER(S): 9 INJECTION, SOLUTION INTRAVENOUS at 15:16

## 2025-01-20 RX ADMIN — ACETAMINOPHEN 975 MILLIGRAM(S): 160 SUSPENSION ORAL at 15:15

## 2025-01-20 RX ADMIN — OSELTAMIVIR PHOSPHATE 75 MILLIGRAM(S): 75 CAPSULE ORAL at 15:50

## 2025-01-20 RX ADMIN — SODIUM CHLORIDE 1000 MILLILITER(S): 9 INJECTION, SOLUTION INTRAVENOUS at 15:52

## 2025-01-20 RX ADMIN — ACETAMINOPHEN 975 MILLIGRAM(S): 160 SUSPENSION ORAL at 15:44

## 2025-01-20 NOTE — OB PROVIDER TRIAGE NOTE - NSHPPHYSICALEXAM_GEN_ALL_CORE
Vital Signs Last 24 Hrs  T(C): 37 (20 Jan 2025 14:39), Max: 37 (20 Jan 2025 13:45)  T(F): 98.6 (20 Jan 2025 14:39), Max: 98.6 (20 Jan 2025 13:45)  HR: 120 (20 Jan 2025 14:39) (120 - 128)  BP: 104/54 (20 Jan 2025 14:39) (101/57 - 104/54)  BP(mean): --  RR: 19 (20 Jan 2025 14:39) (19 - 19)  SpO2: 99% (20 Jan 2025 14:39) (99% - 99%)    Parameters below as of 20 Jan 2025 14:39  Patient On (Oxygen Delivery Method): room air    Gen: NAD  CV: regular rhythm  Pulm: CT bilat  Abd: soft, Nt, gravid  Ext: no edema bilat    VE: declined    FHT: 150 moderate variability with accels n decels  [place]: CTX 4-6 min

## 2025-01-20 NOTE — OB RN TRIAGE NOTE - FALL HARM RISK - UNIVERSAL INTERVENTIONS
Bed in lowest position, wheels locked, appropriate side rails in place/Call bell, personal items and telephone in reach/Instruct patient to call for assistance before getting out of bed or chair/Non-slip footwear when patient is out of bed/[place] to call system/Physically safe environment - no spills, clutter or unnecessary equipment/Purposeful Proactive Rounding/Room/bathroom lighting operational, light cord in reach

## 2025-01-20 NOTE — OB PROVIDER TRIAGE NOTE - HISTORY OF PRESENT ILLNESS
28 yo  at 34.5 weeks GA EDC  LMP  presents with headache, weakness, fatigue, cough, back pain, sore throat, runny nose after she had a positive rapid flu swab at home. No chest pain or SOB. Thinks she had fever yesterday. Hasn't taken tylenol since 3a.  No sick contacts. 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 No LOF/VB. +FM.     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 No chest pain or SOB. Thinks she had fever yesterday. Hasn't taken tylenol since 3a.  No sick contacts. 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 No LOF/VB. +FM.     PNC with Dr Cavazos c/b:  1) bASA for poor OB hx, neg APLS work up  2) short cervix on progesterone    POBhx:   Ft  6#, uncomplicated   second trimester SAB s/p D&C   sab s/p miso    PGYNhx:  no abnl PAP, no C/F or STI    PMHx:  denies    PShx:  hsc uterine septum resection     Meds: bASA, progesterone    Allergies: NKDA    SHx: no t/E/D    Fhx: denies    ROS; as above

## 2025-01-20 NOTE — OB PROVIDER TRIAGE NOTE - NS_OBGYNHISTORY_OBGYN_ALL_OB_FT
POBhx:   Ft  6#, uncomplicated   second trimester SAB s/p D&C   sab s/p miso    PGYNhx:  no abnl PAP, no C/F or STI

## 2025-01-20 NOTE — OB RN TRIAGE NOTE - CHIEF COMPLAINT QUOTE
I was home and I did a flu test. it was positive for flu a. I am also feeling weak and tired, and I have a pain in the back of my head and back

## 2025-01-20 NOTE — OB PROVIDER TRIAGE NOTE - NSOBPROVIDERNOTE_OBGYN_ALL_OB_FT
28 yo  at 34w5d with headache, body aches, cough, sore throat and fatigue, rapid flu A pos, NSt reactive, afebrile, maternal tachycardia noted, also with PTC, declines VE  - evaluate in triage  - fetal monitoring  - IVF  - tylenol  - resp panel sent  - d/w Dr. Rey (covering) 28 yo  at 34w5d with headache, body aches, cough, sore throat and fatigue, rapid flu A pos, NSt reactive, afebrile, maternal tachycardia noted, also with PTC, declines VE  - evaluate in triage  - fetal monitoring  - IVF  - tylenol  - resp panel sent  - d/w Dr. Rey (covering)    f/u:     influenza A + Tamiflu to start and Erx to her pharmacy    iv fluids-     Tylenol as needed for fever/ body aches    hydrate at home- water/ tea    discharge home today - follow up with dr felix in 1week    continue prenatal vitamins   if water breaks contractions noted vaginal bleeding noted return to delivery room  daily check the baby movements with fetal kick count 28 yo  at 34w5d with headache, body aches, cough, sore throat and fatigue, rapid flu A pos, NSt reactive, afebrile, maternal tachycardia noted, also with PTC, declines VE  - evaluate in triage  - fetal monitoring  - IVF  - tylenol  - resp panel sent  - d/w Dr. Rey (covering)    f/u:     influenza A + Tamiflu to start and Erx to her pharmacy    iv fluids-     Tylenol as needed for fever/ body aches    hydrate at home- water/ tea    discharge home today - follow up with dr felix in 1week    continue prenatal vitamins   if water breaks contractions noted vaginal bleeding noted return to delivery room  daily check the baby movements with fetal kick count    Addendum: 445p  Pt feels better s/p 2L IVf, tylenol, tamiflu. However she has persistent fetal tachy. FHT with late decel x 1 and occ variable. [place] with irregular, painless cTX. VE 2/70/-3. Findings rviewed with Dr. Rey. Plan includes:  [] EKG  [] ua/cx  [] BPP 28 yo  at 34w5d with headache, body aches, cough, sore throat and fatigue, rapid flu A pos, NSt reactive, afebrile, maternal tachycardia noted, also with PTC, declines VE  - evaluate in triage  - fetal monitoring  - IVF  - tylenol  - resp panel sent  - d/w Dr. Rey (covering)    f/u:     influenza A + Tamiflu to start and Erx to her pharmacy    iv fluids-     Tylenol as needed for fever/ body aches    hydrate at home- water/ tea    discharge home today - follow up with dr felix in 1week    continue prenatal vitamins   if water breaks contractions noted vaginal bleeding noted return to delivery room  daily check the baby movements with fetal kick count    Addendum: 445p  Pt feels better s/p 2L IVf, tylenol, tamiflu. However she has persistent fetal tachy. FHT with late decel x 1 and occ variable. [place] with irregular, painless cTX. VE 2/70/-3. Findings rviewed with Dr. Rey. Plan includes:  [] EKG  [] ua/cx  [] BPP    Addendum: 645p  pt feels better. No chest pain/SOB. s/p 2L IVF  maternal HR improved to <110  EKG sinus tach at 104bpm  NST reactive with no further decels  BPP 8/8, MICKI 16.1cm, EFW 2691gm  [place] nadira with less frequent CTX  Ua with tr bl, leuks, rfx ucx pending  VE unchanged    DC home with PTL precautions  Kick counts reviewed  Pt to return if SOB/CP or with worsening flu sx's  tylenol prn fever/body aches  drink lots or water  follow up with dr. Felix as scheduled  plan d/w Dr. Rey (covering attg)

## 2025-01-20 NOTE — OB PROVIDER TRIAGE NOTE - NSMATERNALFETALCONCERNS_OBGYN_ALL_OB_FT
Unknown if ever smoked
Fetal Alert  24 - Carrier for G6PD.  Notify peds to assess for symptoms in the . -Cheryl Levy RNC

## 2025-01-20 NOTE — OB PROVIDER TRIAGE NOTE - YOU WERE IN THE HOSPITAL FOR:
influenza A + Tamiflu to start and Erx to her pharmacy    iv fluids-     Tylenol as needed for fever/ body aches    hydrate at home- water/ tea    discharge home today - follow up with dr felix in 1week    continue prenatal vitamins   if water breaks contractions noted vaginal bleeding noted return to delivery room  daily check the baby movements with fetal kick count

## 2025-01-20 NOTE — OB RN TRIAGE NOTE - NS_OBGYNHISTORY_OBGYN_ALL_OB_FT
SAB 2020 16wks ( no baby in sac) w/ D&C  SAB 10/2020 wks with pills     2022 FT Girl 37wks     Laparoscopy uterus septum

## 2025-01-20 NOTE — OB PROVIDER TRIAGE NOTE - ADDITIONAL INSTRUCTIONS
influenza A + Tamiflu to start and Erx to her pharmacy    iv fluids-     Tylenol as needed for fever/ body aches    hydrate at home- water/ tea    discharge home today - follow up with dr felix in 1week    continue prenatal vitamins   if water breaks contractions noted vaginal bleeding noted return to delivery room  daily check the baby movements with fetal kick count influenza A + Tamiflu to start and Erx to her pharmacy    iv fluids-     Tylenol as needed for fever/ body aches    hydrate at home- water/ tea    discharge home today - follow up with dr felix in 1week    continue prenatal vitamins   if water breaks contractions noted vaginal bleeding noted return to delivery room  daily check the baby movements with fetal kick count  return if worsening flu symptoms or if chest pain or shortness of breath

## 2025-01-21 PROBLEM — Z78.9 OTHER SPECIFIED HEALTH STATUS: Chronic | Status: INACTIVE | Noted: 2024-12-17 | Resolved: 2025-01-20

## 2025-01-23 ENCOUNTER — OUTPATIENT (OUTPATIENT)
Dept: OUTPATIENT SERVICES | Facility: HOSPITAL | Age: 30
LOS: 1 days | End: 2025-01-23
Payer: COMMERCIAL

## 2025-01-23 VITALS
DIASTOLIC BLOOD PRESSURE: 58 MMHG | TEMPERATURE: 98 F | HEART RATE: 79 BPM | OXYGEN SATURATION: 99 % | SYSTOLIC BLOOD PRESSURE: 91 MMHG | RESPIRATION RATE: 18 BRPM

## 2025-01-23 DIAGNOSIS — J10.1 INFLUENZA DUE TO OTHER IDENTIFIED INFLUENZA VIRUS WITH OTHER RESPIRATORY MANIFESTATIONS: ICD-10-CM

## 2025-01-23 DIAGNOSIS — O09.293 SUPERVISION OF PREGNANCY WITH OTHER POOR REPRODUCTIVE OR OBSTETRIC HISTORY, THIRD TRIMESTER: ICD-10-CM

## 2025-01-23 DIAGNOSIS — D68.61 ANTIPHOSPHOLIPID SYNDROME: ICD-10-CM

## 2025-01-23 DIAGNOSIS — O99.113 OTHER DISEASES OF THE BLOOD AND BLOOD-FORMING ORGANS AND CERTAIN DISORDERS INVOLVING THE IMMUNE MECHANISM COMPLICATING PREGNANCY, THIRD TRIMESTER: ICD-10-CM

## 2025-01-23 DIAGNOSIS — Z98.891 HISTORY OF UTERINE SCAR FROM PREVIOUS SURGERY: Chronic | ICD-10-CM

## 2025-01-23 DIAGNOSIS — O26.899 OTHER SPECIFIED PREGNANCY RELATED CONDITIONS, UNSPECIFIED TRIMESTER: ICD-10-CM

## 2025-01-23 DIAGNOSIS — O26.873 CERVICAL SHORTENING, THIRD TRIMESTER: ICD-10-CM

## 2025-01-23 DIAGNOSIS — Z3A.34 34 WEEKS GESTATION OF PREGNANCY: ICD-10-CM

## 2025-01-23 DIAGNOSIS — O99.513 DISEASES OF THE RESPIRATORY SYSTEM COMPLICATING PREGNANCY, THIRD TRIMESTER: ICD-10-CM

## 2025-01-23 DIAGNOSIS — Z20.822 CONTACT WITH AND (SUSPECTED) EXPOSURE TO COVID-19: ICD-10-CM

## 2025-01-23 PROCEDURE — 59025 FETAL NON-STRESS TEST: CPT | Mod: 26

## 2025-01-23 PROCEDURE — 99283 EMERGENCY DEPT VISIT LOW MDM: CPT

## 2025-01-23 PROCEDURE — G0463: CPT

## 2025-01-23 PROCEDURE — 59025 FETAL NON-STRESS TEST: CPT

## 2025-01-23 PROCEDURE — 99221 1ST HOSP IP/OBS SF/LOW 40: CPT | Mod: 25

## 2025-01-23 NOTE — OB PROVIDER TRIAGE NOTE - NSHPPHYSICALEXAM_GEN_ALL_CORE
Vital Signs Last 24 Hrs  T(C): 36.8 (23 Jan 2025 19:00), Max: 36.8 (23 Jan 2025 19:00)  T(F): 98.2 (23 Jan 2025 19:00), Max: 98.2 (23 Jan 2025 19:00)  HR: 79 (23 Jan 2025 19:00) (76 - 79)  BP: 91/58 (23 Jan 2025 19:00) (91/58 - 105/57)  RR: 18 (23 Jan 2025 19:00) (18 - 18)  SpO2: 99% (23 Jan 2025 19:00) (98% - 99%)  Parameters below as of 23 Jan 2025 19:00  Patient On (Oxygen Delivery Method): room air    General: patient is resting comfortably in bed, NAD, A&Ox3  Cardiac: RRR  Pulmonary: clear to auscultation throughout   Abdominal: gravid uterus, soft nontender, no guarding or rebound tenderness  SVE: 2/50/-4  Extremities: no edema, nontender     FHT: baseline: 140, moderate variability, + accels, no decels  no contractions

## 2025-01-23 NOTE — OB PROVIDER TRIAGE NOTE - ADDITIONAL INSTRUCTIONS
Continue prenatal vitamins   If water breaks, you develop contractions, or notice vaginal bleeding return to delivery room  Check the baby movements with daily fetal kick count  Modified activities: walk as tolerated to prevent clot formation  Increase hydration, drink 2-3liters of water per day; avoid caffeine beverages which can cause palpitations and dehydration

## 2025-01-23 NOTE — OB PROVIDER TRIAGE NOTE - NSOBPROVIDERNOTE_OBGYN_ALL_OB_FT
30 yo  @ 35.1 weeks (LMP: 24, YENY: 25) presenting for vaginal discharge and pelvic pressure  patient does not appear to be in  labor     - NST reactive, category I   - advised patient to use abdominal support band for pelvic pressure   - discussed with Dr. Nash  - follow up outpatient  25  - return precautions given

## 2025-01-23 NOTE — OB PROVIDER TRIAGE NOTE - HISTORY OF PRESENT ILLNESS
28 yo  @ 35.1 weeks (LMP: 24, YENY: 25) presenting for vaginal discharge and pelvic pressure.  Patient had one episode of mucus like discharge today. Reports cramping in the lower abdomen and back.  Patient otherwise feeling well. Reports + fetal movement. Denies vaginal bleeding, loss of fluid, contractions. Denies headache, visual disturbances, epigastric pain, chest pain, shortness of breath, N/V/D/C, fever, chills, abdominal pain.  Patient flu A positive on , on tamiflu, reports symptoms resolving     PNC: follows with Dr. Cavazos, uncomplicated   OBHx:   2020 SAB @ 16 weeks w/ D&C   SAB with pills   2022 FT  uncomplicated   GynHx: denies fibroids, cysts, STD, abnormal PAP  PMHx: antiphospholipid syndrome   Meds: PNV   ASA 81 mg qd  tamiflu   PSHx:  laparoscopic uterine septum procedure   Allergies: no known allergies   Social: denies tobacco/etoh/drug use   Psych: denies anxiety, depression, PTSD

## 2025-01-23 NOTE — OB RN TRIAGE NOTE - FALL HARM RISK - UNIVERSAL INTERVENTIONS
Bed in lowest position, wheels locked, appropriate side rails in place/Call bell, personal items and telephone in reach/Instruct patient to call for assistance before getting out of bed or chair/Non-slip footwear when patient is out of bed/[place] to call system/Physically safe environment - no spills, clutter or unnecessary equipment/Purposeful Proactive Rounding/Room/bathroom lighting operational, light cord in reach

## 2025-01-24 PROBLEM — D68.61 ANTIPHOSPHOLIPID SYNDROME: Chronic | Status: ACTIVE | Noted: 2025-01-20

## 2025-01-27 ENCOUNTER — LABORATORY RESULT (OUTPATIENT)
Age: 30
End: 2025-01-27

## 2025-01-27 ENCOUNTER — APPOINTMENT (OUTPATIENT)
Dept: OBGYN | Facility: CLINIC | Age: 30
End: 2025-01-27
Payer: COMMERCIAL

## 2025-01-27 VITALS
SYSTOLIC BLOOD PRESSURE: 123 MMHG | BODY MASS INDEX: 32.42 KG/M2 | HEART RATE: 106 BPM | OXYGEN SATURATION: 97 % | WEIGHT: 183 LBS | DIASTOLIC BLOOD PRESSURE: 75 MMHG

## 2025-01-27 DIAGNOSIS — Z3A.35 35 WEEKS GESTATION OF PREGNANCY: ICD-10-CM

## 2025-01-27 DIAGNOSIS — D68.61 ANTIPHOSPHOLIPID SYNDROME: ICD-10-CM

## 2025-01-27 DIAGNOSIS — R10.2 PELVIC AND PERINEAL PAIN: ICD-10-CM

## 2025-01-27 DIAGNOSIS — O09.293 SUPERVISION OF PREGNANCY WITH OTHER POOR REPRODUCTIVE OR OBSTETRIC HISTORY, THIRD TRIMESTER: ICD-10-CM

## 2025-01-27 DIAGNOSIS — O26.893 OTHER SPECIFIED PREGNANCY RELATED CONDITIONS, THIRD TRIMESTER: ICD-10-CM

## 2025-01-27 DIAGNOSIS — O99.891 OTHER SPECIFIED DISEASES AND CONDITIONS COMPLICATING PREGNANCY: ICD-10-CM

## 2025-01-27 DIAGNOSIS — O99.113 OTHER DISEASES OF THE BLOOD AND BLOOD-FORMING ORGANS AND CERTAIN DISORDERS INVOLVING THE IMMUNE MECHANISM COMPLICATING PREGNANCY, THIRD TRIMESTER: ICD-10-CM

## 2025-01-27 DIAGNOSIS — N84.1 POLYP OF CERVIX UTERI: ICD-10-CM

## 2025-01-27 PROCEDURE — 99213 OFFICE O/P EST LOW 20 MIN: CPT | Mod: TH

## 2025-01-28 NOTE — OB RN TRIAGE NOTE - FALL HARM RISK - UNIVERSAL INTERVENTIONS
Advanced care planning/counseling discussion Encounter for palliative care Bed in lowest position, wheels locked, appropriate side rails in place/Call bell, personal items and telephone in reach/Instruct patient to call for assistance before getting out of bed or chair/Non-slip footwear when patient is out of bed/[place] to call system/Physically safe environment - no spills, clutter or unnecessary equipment/Purposeful Proactive Rounding/Room/bathroom lighting operational, light cord in reach

## 2025-01-31 ENCOUNTER — APPOINTMENT (OUTPATIENT)
Dept: ANTEPARTUM | Facility: CLINIC | Age: 30
End: 2025-01-31
Payer: COMMERCIAL

## 2025-01-31 ENCOUNTER — ASOB RESULT (OUTPATIENT)
Age: 30
End: 2025-01-31

## 2025-01-31 PROCEDURE — 76816 OB US FOLLOW-UP PER FETUS: CPT

## 2025-01-31 PROCEDURE — 76819 FETAL BIOPHYS PROFIL W/O NST: CPT | Mod: 59

## 2025-02-02 ENCOUNTER — OUTPATIENT (OUTPATIENT)
Dept: OUTPATIENT SERVICES | Facility: HOSPITAL | Age: 30
LOS: 1 days | End: 2025-02-02
Payer: COMMERCIAL

## 2025-02-02 VITALS
OXYGEN SATURATION: 99 % | HEART RATE: 83 BPM | TEMPERATURE: 99 F | DIASTOLIC BLOOD PRESSURE: 55 MMHG | SYSTOLIC BLOOD PRESSURE: 101 MMHG | RESPIRATION RATE: 18 BRPM

## 2025-02-02 DIAGNOSIS — Z98.891 HISTORY OF UTERINE SCAR FROM PREVIOUS SURGERY: Chronic | ICD-10-CM

## 2025-02-02 DIAGNOSIS — O26.899 OTHER SPECIFIED PREGNANCY RELATED CONDITIONS, UNSPECIFIED TRIMESTER: ICD-10-CM

## 2025-02-02 PROCEDURE — 99284 EMERGENCY DEPT VISIT MOD MDM: CPT

## 2025-02-02 PROCEDURE — G0463: CPT

## 2025-02-02 PROCEDURE — 93010 ELECTROCARDIOGRAM REPORT: CPT

## 2025-02-02 PROCEDURE — 59025 FETAL NON-STRESS TEST: CPT

## 2025-02-02 NOTE — OB PROVIDER TRIAGE NOTE - NSOBPROVIDERNOTE_OBGYN_ALL_OB_FT
28yo  iup at 36.4wks with reactive fhr anxious, PHQ2-0  currently denies any SI/HI  tracing reviewed by Zack  Pt has appt at clinic with Dr Cavazos 2/3/25 keep appt  Labor precautions verbalized  fetal kick counts verbalized
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
## 2025-02-02 NOTE — OB RN TRIAGE NOTE - NS_LMP_OBGYN_ALL_OB_DT
PATIENT'S SURGERY CONSULT NOTE DATED 1/17/24 STATE SHE HAD AN INJECTION IN THE SURGICAL LEG (LEFT HIP), BUT IT WAS ACTUALLY IN THE RIGHT HIP.  HER SURGERY DATE IS 2/19/24 FOR A LEFT MITRA.  I AM PROVIDING THIS INFORMATION IN CASE YOU WANT TO MAKE ANY CHANGES TO THE NOTE.    THANKS,  RAZA RAMIREZ  
22-May-2024

## 2025-02-02 NOTE — OB PROVIDER TRIAGE NOTE - NSICDXPASTMEDICALHX_GEN_ALL_CORE_FT
PAST MEDICAL HISTORY:  Antiphospholipid syndrome     Cystic fibrosis carrier, antepartum     G6PD deficiency     H/O hyperemesis gravidarum     Kidney stones     Short cervix

## 2025-02-02 NOTE — OB RN TRIAGE NOTE - NS_OBGYNHISTORY_OBGYN_ALL_OB_FT
2022 girl FT 6lb 9oz  SAB x2   (D&C x1, pills & injection x1)  Laparoscopic uterine surgery   Anemia  (po iron)  2022 girl FT 6lb 9oz  SAB x2   (D&C x1, pills & injection x1)  Laparoscopic hysteroscopy hx of bicorniate uterus (septum removed   Anemia  (po iron)  Kidney stones - resolved  CF carrier   G6PD carrier  Cervical insufficiency

## 2025-02-02 NOTE — OB PROVIDER TRIAGE NOTE - NSHPPHYSICALEXAM_GEN_ALL_CORE
T(C): 37.3 (02-02-25 @ 13:44), Max: 37.3 (02-02-25 @ 13:44)  HR: 83 (02-02-25 @ 13:44) (83 - 91)  BP: 101/55 (02-02-25 @ 13:44) (101/55 - 102/65)  RR: 18 (02-02-25 @ 13:44) (17 - 18)  SpO2: 99% (02-02-25 @ 13:44) (97% - 99%)  CONSTITUTIONAL: Well groomed, no apparent distress  RESP: No respiratory distress, no use of accessory muscles  CV: RRR,   GI: Soft, NT, ND, no rebound  LYMPH: No cervical LAD or tenderness; no axillary LAD or tenderness  MSK: Normal gait;            Normal ROM without pain  SKIN: No rashes or ulcers noted; no subcutaneous nodules or induration palpable  NEURO: CN II-XII intact; normal reflexes in upper and lower extremities  PSYCH: Appropriate insight/judgment; A+O x 3, mood and affect appropriate   moderate variability reactive  ctx none  sve 2/50/-2/int

## 2025-02-02 NOTE — OB RN TRIAGE NOTE - PRETERM DELIVERIES, OB PROFILE
12/15/2020    Chief Complaint   Patient presents with   • Medicare Wellness Visit     Josue Reis presents for Subsequent Annual Medicare Wellness Visit. He has no current complaints or concerns.     Patient Care Team:  Jacqueline Campuzano MD as PCP - General (Family Practice)    Patient Active Problem List    Diagnosis Date Noted   • Idiopathic chronic gout of multiple sites with tophus 02/13/2020     Priority: Low   • HTN (hypertension)      Priority: Low       Current Outpatient Medications   Medication Sig   • hydrALAZINE (APRESOLINE) 25 MG tablet Take 1 tablet by mouth 3 times daily.   • lisinopril (ZESTRIL) 40 MG tablet Take 1 tablet by mouth daily.   • meloxicam (MOBIC) 7.5 MG tablet Take 1 tablet by mouth daily as needed for Pain. With full meal   • amLODIPine (NORVASC) 5 MG tablet Take 1 tablet by mouth daily.   • colchicine (Colcrys) 0.6 MG tablet Take 1 tablet by mouth daily.   • betamethasone valerate (VALISONE) 0.1 % cream Apply thin layer to affected areas on hands, arms, and legs bid prn rash/itch.   • metoPROLOL succinate (TOPROL-XL) 50 MG 24 hr tablet Take 1 tablet by mouth daily.   • Multiple Vitamins-Minerals (DAILY MULTIVITAMIN PO) Take 1 tablet by mouth daily.   • Ferrous Sulfate 27 MG Tab Take 1 tablet by mouth daily.   • Febuxostat 80 MG Tab Take 80 mg by mouth daily.   • methylPREDNISolone (MEDROL DOSEPAK) 4 MG tablet Take 1 tablet by mouth as directed. follow package directions   • triamcinolone (ARISTOCORT) 0.1 % ointment APPLY TO AFFECTED AREAS TWICE A DAY FOR 2 WEEKS, THEN MON,WED, AND FRI AS NEEDED   • aspirin 81 MG tablet Take 81 mg by mouth daily.     No current facility-administered medications for this visit.        ALLERGIES:   Allergen Reactions   • Pegloticase HIVES     Patient developed hives, flushed face, and red blotches on chest and abdomen 25 minutes into the infusion       Past Medical History:   Diagnosis Date   • Arthritis    • Chickenpox    • Colon polyps 2010    Dr. Goodwin  [place]. Next one is due in    • Fracture     right little finger   • Gout     Bilateral great toes and left knee   • HTN (hypertension)    • Measles    • Mumps    • Post laminectomy syndrome     Dr. Malagon. Resolved   • Shingles 1981    right abdomen       Past Surgical History:   Procedure Laterality Date   • Back surgery     • Excision of lingual tonsil     • Laminectomy      Dr. Ori Malagon   • Tonsillectomy  5       Family History   Problem Relation Age of Onset   • Diabetes Mother         After CABG   • Hypertension Mother    • Heart Mother 50        CABG. 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[employer], was assistant    Social Needs   • Financial resource strain: Not on file   • Food insecurity     Worry: Not on file     Inability: Not on file   • Transportation needs     Medical: Not on file     Non-medical: Not on file   Tobacco Use   • Smoking status: Former Smoker     Packs/day: 1.00     Years: 30.00     Pack years: 30.00     Quit date: 1998     Years since quittin.6   • Smokeless tobacco: Never Used   Substance and Sexual Activity   • Alcohol use: Yes     Alcohol/week: 6.0 standard drinks     Types: 6 Standard drinks or equivalent per week     Comment:  8/week   • Drug use: No   • Sexual activity: Yes     Partners: Female   Lifestyle   • Physical activity     Days per week: Not on file     Minutes per session: Not on file   • Stress: Not on file   Relationships   • Social connections     Talks  on phone: Not on file     Gets together: Not on file     Attends [employer] service: Not on file     Active member of club or organization: Not on file     Attends meetings of clubs or organizations: Not on file     Relationship status: Not on file   • Intimate partner violence     Fear of current or ex partner: Not on file     Emotionally abused: Not on file     Physically abused: Not on file     Forced sexual activity: Not on file   Other Topics Concern   •  Service Yes     Comment: [employer]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    Okay to discuss all medical issues with wife and sons.    Received power of  for health care packet from the office today (July 16, 2014)       Visit Vitals  /70 (BP Location: RUE - Right upper extremity, Patient Position: Sitting, Cuff Size: Regular)   Pulse 79   Temp 96.2 °F (35.7 °C) (Temporal)   Ht 5' 5\" (1.651 m)   Wt 77 kg   SpO2 99%   BMI 28.25 kg/m²         Diet: reviewed with the patient and reveals: none.    Physical activities:  no regular exercise program.    Patient Questionnaire:             Visit Vitals  /70 (BP Location: RUE - Right upper extremity, Patient Position: Sitting, Cuff Size: Regular)   Pulse 79   Temp 96.2 °F (35.7 °C) (Temporal)   Ht 5' 5\" (1.651 m)   Wt 77 kg   SpO2 99%   BMI 28.25 kg/m²     Vision and hearing screens documented:  No exam data present  Advance Directive: power of  for healthcare on file  Cognitive  Assessment: no evidence of cognitive dysfunction by direct observation    Needed Screening/Treatment:   None  Needed follow up:  None      Josue was seen today for medicare wellness visit.    Diagnoses and all orders for this visit:    Medicare annual wellness visit, subsequent    Chronic gout without tophus, unspecified cause, unspecified site  -     CBC WITH DIFFERENTIAL; Future  -     THYROID STIMULATING HORMONE REFLEX; Future  -     VITAMIN D -25 HYDROXY; Future  -     OFFICE/OUTPT VISIT EST LEVEL IV    Essential hypertension  -     CBC WITH DIFFERENTIAL; Future  -     COMPREHENSIVE METABOLIC PANEL; Future  -     THYROID STIMULATING HORMONE REFLEX; Future  -     VITAMIN D -25 HYDROXY; Future  -     OFFICE/OUTPT VISIT EST LEVEL IV    Prostate cancer screening  -     CBC WITH DIFFERENTIAL; Future  -     PSA; Future    Lipid screening  -     LIPID PANEL WITH REFLEX; Future    Encounter for long-term (current) use of medications  -     CBC WITH DIFFERENTIAL; Future  -     THYROID STIMULATING HORMONE REFLEX; Future  -     VITAMIN D -25 HYDROXY; Future  -     OFFICE/OUTPT VISIT EST LEVEL IV    Encounter for vitamin deficiency screening  -     VITAMIN D -25 HYDROXY; Future  -     OFFICE/OUTPT VISIT EST LEVEL IV    Chronic joint pain  -     OFFICE/OUTPT VISIT EST LEVEL IV    Other orders  -     hydrALAZINE (APRESOLINE) 25 MG tablet; Take 1 tablet by mouth 3 times daily.  -     lisinopril (ZESTRIL) 40 MG tablet; Take 1 tablet by mouth daily.  -     meloxicam (MOBIC) 7.5 MG tablet; Take 1 tablet by mouth daily as needed for Pain. With full meal  -     amLODIPine (NORVASC) 5 MG tablet; Take 1 tablet by mouth daily.  -     colchicine (Colcrys) 0.6 MG tablet; Take 1 tablet by mouth daily.          DEPRESSION ASSESSMENT/PLAN:  Depression screening is negative no further plan needed.      Body mass index is 28.25 kg/m².    BMI ASSESSMENT/PLAN:  Patient is overweight.    discussed diet           TIME/TODAY'S DATE: 9:09 AM,  12/15/2020    Chief Complaint   Patient presents with   • Medicare Wellness Visit       SUBJECTIVE:  Josue Reis is an 72 year old male who presents for follow-up on   hypertension. He indicates that he is feeling well and   denies any symptoms referable to his elevated blood pressure.     Current medication regimen is as listed   below. Patient denies any side effects of medication. Patient is on Toprol 50 mg and hydralazine and lisinopril.. Patient has brought in the blood pressure readings with him.    started on amlodipine 5mg and since then blood pressures been stable.  Patient does have some fatigue    Also has gout.  Following up with rheumatologist closely.  Now on Uloric and colchicine.       PROBLEM LIST:    Patient Active Problem List   Diagnosis   • HTN (hypertension)   • Idiopathic chronic gout of multiple sites with tophus       MEDICATIONS:    Current Outpatient Medications   Medication Sig Dispense Refill   • hydrALAZINE (APRESOLINE) 25 MG tablet Take 1 tablet by mouth 3 times daily. 270 tablet 3   • lisinopril (ZESTRIL) 40 MG tablet Take 1 tablet by mouth daily. 90 tablet 3   • meloxicam (MOBIC) 7.5 MG tablet Take 1 tablet by mouth daily as needed for Pain. With full meal 30 tablet 2   • amLODIPine (NORVASC) 5 MG tablet Take 1 tablet by mouth daily. 90 tablet 3   • colchicine (Colcrys) 0.6 MG tablet Take 1 tablet by mouth daily. 90 tablet 3   • betamethasone valerate (VALISONE) 0.1 % cream Apply thin layer to affected areas on hands, arms, and legs bid prn rash/itch. 45 g 1   • metoPROLOL succinate (TOPROL-XL) 50 MG 24 hr tablet Take 1 tablet by mouth daily. 90 tablet 3   • Multiple Vitamins-Minerals (DAILY MULTIVITAMIN PO) Take 1 tablet by mouth daily.     • Ferrous Sulfate 27 MG Tab Take 1 tablet by mouth daily.     • Febuxostat 80 MG Tab Take 80 mg by mouth daily. 90 tablet 1   • methylPREDNISolone (MEDROL DOSEPAK) 4 MG tablet Take 1 tablet by mouth as directed. follow package directions 1  packet 0   • triamcinolone (ARISTOCORT) 0.1 % ointment APPLY TO AFFECTED AREAS TWICE A DAY FOR 2 WEEKS, THEN MON,WED, AND FRI AS NEEDED  0   • aspirin 81 MG tablet Take 81 mg by mouth daily.       No current facility-administered medications for this visit.        ALLERGIES:    ALLERGIES:   Allergen Reactions   • Pegloticase HIVES     Patient developed hives, flushed face, and red blotches on chest and abdomen 25 minutes into the infusion       PAST MEDICAL HISTORY:    Past Medical History:   Diagnosis Date   • Arthritis    • Chickenpox    • Colon polyps     Dr. Buddy Amaral. Next one is due in    • Fracture     right little finger   • Gout     Bilateral great toes and left knee   • HTN (hypertension)    • Measles    • Mumps    • Post laminectomy syndrome     Dr. Malagon. Resolved   • Shingles     right abdomen       SURGICAL HISTORY:    Past Surgical History:   Procedure Laterality Date   • Back surgery     • Excision of lingual tonsil     • Laminectomy      Dr. Ori Malagon   • Tonsillectomy         FAMILY HISTORY:    Family History   Problem Relation Age of Onset   • Diabetes Mother         After CABG   • Hypertension Mother    • Heart Mother 50        CABG. Two-vessel   • Stroke Mother 82   • Hypertension Father    • Heart Father 65        CABG   • Hypertension Sister    • Cancer Paternal Grandmother 73        colon   • Cancer Paternal Grandfather         pancretic       SOCIAL HISTORY:       Social History     Tobacco Use   • Smoking status: Former Smoker     Packs/day: 1.00     Years: 30.00     Pack years: 30.00     Quit date: 1998     Years since quittin.6   • Smokeless tobacco: Never Used   Substance Use Topics   • Alcohol use: Yes     Alcohol/week: 6.0 standard drinks     Types: 6 Standard drinks or equivalent per week     Comment:  8/week   • Drug use: No         Review of systems:     A 10 point review system was done and was negative except as in history of present  illness    OBJECTIVE:  Vital Signs:    Visit Vitals  /70 (BP Location: RUE - Right upper extremity, Patient Position: Sitting, Cuff Size: Regular)   Pulse 79   Temp 96.2 °F (35.7 °C) (Temporal)   Ht 5' 5\" (1.651 m)   Wt 77 kg   SpO2 99%   BMI 28.25 kg/m²     General:   Alert, cooperative, conversive, in no acute distress.  Skin:  Warm and dry     Head:  Normocephalic-atraumatic.   Neck:  Trachea is midline.      Eyes:  Normal conjunctivae and sclera.  PERRL.  EOMI.  ENT:  Mucous membranes are moist.   Normal nasal mucosa.  Cardiovascular:  Symmetrical pulses.  Regular, rate and rhythm without murmur.  Respiratory:  Normal respiratory effort.  CTA.     Musculoskeletal:  Range of motion normal throughout  Back:   Normal alignment.  No CVA tenderness or midline bony tenderness.  Neurologic:   Alert. EOMI. No gross motor or sensory deficits. No facial droop or dysarthria.   Psychiatric:   Cooperative.  Appropriate mood & affect.        ASSESSMENT:  1. Medicare annual wellness visit, subsequent    2. Chronic gout without tophus, unspecified cause, unspecified site    3. Essential hypertension    4. Prostate cancer screening    5. Lipid screening    6. Encounter for long-term (current) use of medications    7. Encounter for vitamin deficiency screening    8. Chronic joint pain        PLAN:  Orders Placed This Encounter   • OFFICE/OUTPT VISIT EST LEVEL IV   • CBC with Automated Differential   • Comprehensive Metabolic Panel   • PSA   • Lipid Panel With Reflex   • Thyroid Stimulating Hormone Reflex   • Vitamin D -25 Hydroxy   • hydrALAZINE (APRESOLINE) 25 MG tablet   • lisinopril (ZESTRIL) 40 MG tablet   • meloxicam (MOBIC) 7.5 MG tablet   • amLODIPine (NORVASC) 5 MG tablet   • colchicine (Colcrys) 0.6 MG tablet          >Blood pressure:  Slightly on the lower side as some blood pressure readings were less than 100 over 60  At this time will bring down hydralazine to 25 mg t.i.d..  Continue with amlodipine 5 mg  daily,, lisinopril 40 mg daily and metoprolol XL 50 mg once a day.      > Gout: Controlled on colchicine and Uloric. Given refills.           >  patient takes meloxicam for chronic pain as needed.  Advised to take it only as needed and on a full meal.  Discussed side effects with the patient and he verbalized understanding.    Follow-up in yearly          Treatment options discussed with patient and explained in detail. The risks,benefits and potential side effects of possible medications were reviewed. Alternatives were discussed. Medication instructions and consequences of not taking the medications were discussed. Patient's understanding was assessed and patient agreed with the plan. Monitoring parameters and expected course outlined. Patient to call or come in if symptoms fail to respond as outlined, or worsen in any way.     0

## 2025-02-02 NOTE — OB RN TRIAGE NOTE - CHIEF COMPLAINT QUOTE
I have a headache, I feel pain in pelvis and I don't feel baby moving as much I also have body aches it hurts to walk

## 2025-02-02 NOTE — OB PROVIDER TRIAGE NOTE - NS_OBGYNHISTORY_OBGYN_ALL_OB_FT
Surgical: hysteroscopy and laparoscopy for removal of uterine septum    OBHX: 22--> 37.3wks FT, , [id number], FHH uncomplciated              3/2020--> 16 wks missed ab s/p D&C             10/2020--> SAB at 4 wks with pills   GYN hx: , + hx of ovarian cysts, no fibroids, no STDS, no abnormal paps , last pap NLIM 24

## 2025-02-02 NOTE — OB RN TRIAGE NOTE - AVIAN FLU SYMPTOMS
[facility]    [address] [address]    Phone:  [phone]                                       Yrn Puente   MRN: [id number]    Department:  [facility]   Date of Visit:  10/7/2017           After Visit Summary Signature Page     I have received my discharge instructions, and my questions have been answered. I have discussed any challenges I see with this plan with the nurse or doctor.    ..........................................................................................................................................  Patient/Patient Representative Signature      ..........................................................................................................................................  Patient Representative Print Name and Relationship to Patient    ..................................................               ................................................  Date                                            Time    ..........................................................................................................................................  Reviewed by Signature/Title    ...................................................              ..............................................  Date                                                            Time           No

## 2025-02-02 NOTE — OB PROVIDER TRIAGE NOTE - NS_FETALPRESENTATIONA_OBGYN_ALL_OB
Birth Control Pills Pregnancy And Lactation Text: This medication should be avoided if pregnant and for the first 30 days post-partum. Cephalic

## 2025-02-03 ENCOUNTER — APPOINTMENT (OUTPATIENT)
Dept: OBGYN | Facility: CLINIC | Age: 30
End: 2025-02-03
Payer: COMMERCIAL

## 2025-02-03 VITALS
WEIGHT: 183 LBS | DIASTOLIC BLOOD PRESSURE: 65 MMHG | OXYGEN SATURATION: 99 % | BODY MASS INDEX: 32.42 KG/M2 | HEART RATE: 87 BPM | SYSTOLIC BLOOD PRESSURE: 105 MMHG

## 2025-02-03 PROCEDURE — 99213 OFFICE O/P EST LOW 20 MIN: CPT | Mod: TH

## 2025-02-07 DIAGNOSIS — N83.209 UNSPECIFIED OVARIAN CYST, UNSPECIFIED SIDE: ICD-10-CM

## 2025-02-07 DIAGNOSIS — R45.0 NERVOUSNESS: ICD-10-CM

## 2025-02-07 DIAGNOSIS — O99.013 ANEMIA COMPLICATING PREGNANCY, THIRD TRIMESTER: ICD-10-CM

## 2025-02-07 DIAGNOSIS — D64.9 ANEMIA, UNSPECIFIED: ICD-10-CM

## 2025-02-07 DIAGNOSIS — Z3A.36 36 WEEKS GESTATION OF PREGNANCY: ICD-10-CM

## 2025-02-07 DIAGNOSIS — O09.293 SUPERVISION OF PREGNANCY WITH OTHER POOR REPRODUCTIVE OR OBSTETRIC HISTORY, THIRD TRIMESTER: ICD-10-CM

## 2025-02-07 DIAGNOSIS — O26.893 OTHER SPECIFIED PREGNANCY RELATED CONDITIONS, THIRD TRIMESTER: ICD-10-CM

## 2025-02-07 DIAGNOSIS — O34.83 MATERNAL CARE FOR OTHER ABNORMALITIES OF PELVIC ORGANS, THIRD TRIMESTER: ICD-10-CM

## 2025-02-08 ENCOUNTER — INPATIENT (INPATIENT)
Facility: HOSPITAL | Age: 30
LOS: 1 days | Discharge: HOME | End: 2025-02-10
Attending: OBSTETRICS & GYNECOLOGY | Admitting: OBSTETRICS & GYNECOLOGY
Payer: COMMERCIAL

## 2025-02-08 VITALS
HEART RATE: 89 BPM | RESPIRATION RATE: 16 BRPM | OXYGEN SATURATION: 100 % | SYSTOLIC BLOOD PRESSURE: 102 MMHG | TEMPERATURE: 98 F | DIASTOLIC BLOOD PRESSURE: 59 MMHG

## 2025-02-08 DIAGNOSIS — Z34.80 ENCOUNTER FOR SUPERVISION OF OTHER NORMAL PREGNANCY, UNSPECIFIED TRIMESTER: ICD-10-CM

## 2025-02-08 DIAGNOSIS — O26.899 OTHER SPECIFIED PREGNANCY RELATED CONDITIONS, UNSPECIFIED TRIMESTER: ICD-10-CM

## 2025-02-08 DIAGNOSIS — Z98.891 HISTORY OF UTERINE SCAR FROM PREVIOUS SURGERY: Chronic | ICD-10-CM

## 2025-02-08 PROBLEM — O09.899 SUPERVISION OF OTHER HIGH RISK PREGNANCIES, UNSPECIFIED TRIMESTER: Chronic | Status: ACTIVE | Noted: 2025-02-02

## 2025-02-08 PROBLEM — Z87.59 PERSONAL HISTORY OF OTHER COMPLICATIONS OF PREGNANCY, CHILDBIRTH AND THE PUERPERIUM: Chronic | Status: ACTIVE | Noted: 2025-02-02

## 2025-02-08 PROBLEM — N88.3 INCOMPETENCE OF CERVIX UTERI: Chronic | Status: ACTIVE | Noted: 2025-02-02

## 2025-02-08 PROBLEM — D75.A GLUCOSE-6-PHOSPHATE DEHYDROGENASE (G6PD) DEFICIENCY WITHOUT ANEMIA: Chronic | Status: ACTIVE | Noted: 2025-02-02

## 2025-02-08 PROBLEM — N20.0 CALCULUS OF KIDNEY: Chronic | Status: ACTIVE | Noted: 2025-02-02

## 2025-02-08 LAB
APTT BLD: 25.7 SEC — SIGNIFICANT CHANGE UP (ref 24.5–35.6)
BASOPHILS # BLD AUTO: 0.02 K/UL — SIGNIFICANT CHANGE UP (ref 0–0.2)
BASOPHILS NFR BLD AUTO: 0.2 % — SIGNIFICANT CHANGE UP (ref 0–2)
EOSINOPHIL # BLD AUTO: 0.1 K/UL — SIGNIFICANT CHANGE UP (ref 0–0.5)
EOSINOPHIL NFR BLD AUTO: 1.1 % — SIGNIFICANT CHANGE UP (ref 0–6)
FIBRINOGEN PPP-MCNC: 436 MG/DL — SIGNIFICANT CHANGE UP (ref 200–475)
HCT VFR BLD CALC: 39.1 % — SIGNIFICANT CHANGE UP (ref 34.5–45)
HGB BLD-MCNC: 13.4 G/DL — SIGNIFICANT CHANGE UP (ref 11.5–15.5)
IMM GRANULOCYTES NFR BLD AUTO: 0.3 % — SIGNIFICANT CHANGE UP (ref 0–0.9)
INR BLD: 0.86 RATIO — SIGNIFICANT CHANGE UP (ref 0.85–1.16)
LYMPHOCYTES # BLD AUTO: 1.87 K/UL — SIGNIFICANT CHANGE UP (ref 1–3.3)
LYMPHOCYTES # BLD AUTO: 20 % — SIGNIFICANT CHANGE UP (ref 13–44)
MCHC RBC-ENTMCNC: 32.4 PG — SIGNIFICANT CHANGE UP (ref 27–34)
MCHC RBC-ENTMCNC: 34.3 G/DL — SIGNIFICANT CHANGE UP (ref 32–36)
MCV RBC AUTO: 94.7 FL — SIGNIFICANT CHANGE UP (ref 80–100)
MONOCYTES # BLD AUTO: 0.76 K/UL — SIGNIFICANT CHANGE UP (ref 0–0.9)
MONOCYTES NFR BLD AUTO: 8.1 % — SIGNIFICANT CHANGE UP (ref 2–14)
NEUTROPHILS # BLD AUTO: 6.56 K/UL — SIGNIFICANT CHANGE UP (ref 1.8–7.4)
NEUTROPHILS NFR BLD AUTO: 70.3 % — SIGNIFICANT CHANGE UP (ref 43–77)
NRBC # BLD: 0 /100 WBCS — SIGNIFICANT CHANGE UP (ref 0–0)
NRBC BLD-RTO: 0 /100 WBCS — SIGNIFICANT CHANGE UP (ref 0–0)
PLATELET # BLD AUTO: 207 K/UL — SIGNIFICANT CHANGE UP (ref 150–400)
PROTHROM AB SERPL-ACNC: 10 SEC — SIGNIFICANT CHANGE UP (ref 9.9–13.4)
RBC # BLD: 4.13 M/UL — SIGNIFICANT CHANGE UP (ref 3.8–5.2)
RBC # FLD: 12.2 % — SIGNIFICANT CHANGE UP (ref 10.3–14.5)
T PALLIDUM AB TITR SER: NEGATIVE — SIGNIFICANT CHANGE UP
WBC # BLD: 9.34 K/UL — SIGNIFICANT CHANGE UP (ref 3.8–10.5)
WBC # FLD AUTO: 9.34 K/UL — SIGNIFICANT CHANGE UP (ref 3.8–10.5)

## 2025-02-08 PROCEDURE — 59409 OBSTETRICAL CARE: CPT | Mod: U9

## 2025-02-08 RX ORDER — HYDROCORTISONE 1 %
1 CREAM (GRAM) TOPICAL EVERY 6 HOURS
Refills: 0 | Status: DISCONTINUED | OUTPATIENT
Start: 2025-02-08 | End: 2025-02-10

## 2025-02-08 RX ORDER — OXYTOCIN/0.9 % SODIUM CHLORIDE 10/500ML
PLASTIC BAG, INJECTION (ML) INTRAVENOUS
Qty: 30 | Refills: 0 | Status: DISCONTINUED | OUTPATIENT
Start: 2025-02-08 | End: 2025-02-08

## 2025-02-08 RX ORDER — IBUPROFEN 600 MG/1
600 TABLET, FILM COATED ORAL EVERY 6 HOURS
Refills: 0 | Status: COMPLETED | OUTPATIENT
Start: 2025-02-08 | End: 2026-01-07

## 2025-02-08 RX ORDER — PNV WITH CALCIUM NO.11/IRON/FA 65 MG-1 MG
1 TABLET ORAL DAILY
Refills: 0 | Status: DISCONTINUED | OUTPATIENT
Start: 2025-02-08 | End: 2025-02-10

## 2025-02-08 RX ORDER — MAGNESIUM HYDROXIDE 400 MG/5ML
30 SUSPENSION, ORAL (FINAL DOSE FORM) ORAL
Refills: 0 | Status: DISCONTINUED | OUTPATIENT
Start: 2025-02-08 | End: 2025-02-10

## 2025-02-08 RX ORDER — BACTERIOSTATIC SODIUM CHLORIDE 0.9 %
3 VIAL (ML) INJECTION EVERY 8 HOURS
Refills: 0 | Status: DISCONTINUED | OUTPATIENT
Start: 2025-02-08 | End: 2025-02-10

## 2025-02-08 RX ORDER — OXYTOCIN/0.9 % SODIUM CHLORIDE 10/500ML
167 PLASTIC BAG, INJECTION (ML) INTRAVENOUS
Qty: 30 | Refills: 0 | Status: DISCONTINUED | OUTPATIENT
Start: 2025-02-08 | End: 2025-02-08

## 2025-02-08 RX ORDER — ACETAMINOPHEN 160 MG/5ML
975 SUSPENSION ORAL EVERY 6 HOURS
Refills: 0 | Status: DISCONTINUED | OUTPATIENT
Start: 2025-02-08 | End: 2025-02-10

## 2025-02-08 RX ORDER — WITCH HAZEL 86 ML/100ML
1 OIL ORAL; TOPICAL EVERY 4 HOURS
Refills: 0 | Status: DISCONTINUED | OUTPATIENT
Start: 2025-02-08 | End: 2025-02-10

## 2025-02-08 RX ORDER — PRAMOXINE HCL 1 %
1 CREAM (GRAM) RECTAL EVERY 4 HOURS
Refills: 0 | Status: DISCONTINUED | OUTPATIENT
Start: 2025-02-08 | End: 2025-02-10

## 2025-02-08 RX ORDER — DIPHENHYDRAMINE HCL 25 MG
25 CAPSULE ORAL EVERY 6 HOURS
Refills: 0 | Status: DISCONTINUED | OUTPATIENT
Start: 2025-02-08 | End: 2025-02-10

## 2025-02-08 RX ORDER — OXYTOCIN/0.9 % SODIUM CHLORIDE 10/500ML
167 PLASTIC BAG, INJECTION (ML) INTRAVENOUS
Qty: 30 | Refills: 0 | Status: DISCONTINUED | OUTPATIENT
Start: 2025-02-08 | End: 2025-02-10

## 2025-02-08 RX ORDER — KETOROLAC TROMETHAMINE 10 MG
30 TABLET ORAL ONCE
Refills: 0 | Status: DISCONTINUED | OUTPATIENT
Start: 2025-02-08 | End: 2025-02-10

## 2025-02-08 RX ORDER — CLOSTRIDIUM TETANI TOXOID ANTIGEN (FORMALDEHYDE INACTIVATED), CORYNEBACTERIUM DIPHTHERIAE TOXOID ANTIGEN (FORMALDEHYDE INACTIVATED), BORDETELLA PERTUSSIS TOXOID ANTIGEN (GLUTARALDEHYDE INACTIVATED), BORDETELLA PERTUSSIS FILAMENTOUS HEMAGGLUTININ ANTIGEN (FORMALDEHYDE INACTIVATED), BORDETELLA PERTUSSIS PERTACTIN ANTIGEN, AND BORDETELLA PERTUSSIS FIMBRIAE 2/3 ANTIGEN 5; 2; 2.5; 5; 3; 5 [LF]/.5ML; [LF]/.5ML; UG/.5ML; UG/.5ML; UG/.5ML; UG/.5ML
0.5 INJECTION, SUSPENSION INTRAMUSCULAR ONCE
Refills: 0 | Status: DISCONTINUED | OUTPATIENT
Start: 2025-02-08 | End: 2025-02-10

## 2025-02-08 RX ORDER — ANTISEPTIC SURGICAL SCRUB 0.04 MG/ML
1 SOLUTION TOPICAL DAILY
Refills: 0 | Status: DISCONTINUED | OUTPATIENT
Start: 2025-02-08 | End: 2025-02-08

## 2025-02-08 RX ORDER — OXYCODONE HYDROCHLORIDE 30 MG/1
5 TABLET ORAL
Refills: 0 | Status: DISCONTINUED | OUTPATIENT
Start: 2025-02-08 | End: 2025-02-10

## 2025-02-08 RX ORDER — SODIUM CHLORIDE 9 G/ML
1000 INJECTION, SOLUTION INTRAVENOUS
Refills: 0 | Status: DISCONTINUED | OUTPATIENT
Start: 2025-02-08 | End: 2025-02-08

## 2025-02-08 RX ADMIN — Medication 200 GRAM(S): at 06:30

## 2025-02-08 RX ADMIN — ACETAMINOPHEN 975 MILLIGRAM(S): 160 SUSPENSION ORAL at 20:46

## 2025-02-08 RX ADMIN — Medication 167 MILLIUNIT(S)/MIN: at 18:33

## 2025-02-08 RX ADMIN — Medication 100 GRAM(S): at 06:31

## 2025-02-08 RX ADMIN — Medication 100 GRAM(S): at 10:52

## 2025-02-08 RX ADMIN — Medication 2 MILLIUNIT(S)/MIN: at 09:35

## 2025-02-08 RX ADMIN — ACETAMINOPHEN 975 MILLIGRAM(S): 160 SUSPENSION ORAL at 21:00

## 2025-02-08 NOTE — OB RN DELIVERY SUMMARY - NSDELAYEDCLAMPA_OBGYN_ALL_OB
The patient has been examined and the H&P has been reviewed:    I concur with the findings and no changes have occurred since H&P was written.    Anesthesia/Surgery risks, benefits and alternative options discussed and understood by patient/family.    There are no hospital problems to display for this patient.     Yes

## 2025-02-08 NOTE — OB PROVIDER LABOR PROGRESS NOTE - ASSESSMENT
Patient is a 29 year old  at 37w3d in early labor.   - start IV pitocin augmentation   - ampicillin for GBS unknown   - pain management per patient   - continue close fetal and maternal monitoring   - Pt seen with Dr. Cavazos 
Patient is a 29 year old  at 37w3d in early labor.   - cont IV pitocin augmentation   - ampicillin for GBS unknown   - pain management per patient   - continue close fetal and maternal monitoring   - Pt seen with Dr. Cavazos 
Patient is a 29 year old  at 37w3d in early labor.   - expect    - cont IV pitocin augmentation   - ampicillin for GBS unknown   - pain management per patient   - continue close fetal and maternal monitoring   - Pt seen with Dr. Cavazos

## 2025-02-08 NOTE — OB PROVIDER H&P - PROBLEM SELECTOR PLAN 1
Admit and consent  IV fluids and initial bloodwork  Continuous monitoring fht, toco, vitals  Pain management as needed  Ampicilllin for GBS  Augmentation as needed

## 2025-02-08 NOTE — OB RN DELIVERY SUMMARY - NS_SEPSISRSKCALC_OBGYN_ALL_OB_FT
EOS calculated successfully. EOS Risk Factor: 0.5/1000 live births ([facility] national incidence); GA=37w3d; Temp=98.6; ROM=3.183; GBS='Positive'; Antibiotics='GBS specific antibiotics > 2 hrs prior to birth'

## 2025-02-08 NOTE — OB PROVIDER H&P - NS_PREOPBLOODCONS_OBGYN_ALL_OB
Pt at risk for aspiration pneumonia. Continued to have several episodes of projectile nausea with yellow/green emesis throughout day. Tube feeds continue via Jtube and prophylactic Zofran given q6h. No residuals from Gtube. Dr. Ash and mother Savannah updated. Compazine, decadron, and abdominal XR ordered.    n/a

## 2025-02-08 NOTE — OB PROVIDER LABOR PROGRESS NOTE - NS_SUBJECTIVE/OBJECTIVE_OBGYN_ALL_OB_FT
Pt seen and examined at bedside, states to feel comfortable, feels some contraction pain.
Pt seen and examined at bedside, states to feel comfortable with epidural.
Pt seen and examined at bedside, states to feel rectal pressure.

## 2025-02-08 NOTE — OB RN PATIENT PROFILE - PAIN SCALE PREFERRED, PROFILE
numerical 0-10 Oxybutynin Counseling:  I discussed with the patient the risks of oxybutynin including but not limited to skin rash, drowsiness, dry mouth, difficulty urinating, and blurred vision.

## 2025-02-08 NOTE — OB PROVIDER H&P - ALERT: PERTINENT HISTORY
Called patient to inform that I will call preservice to see why there is a block on scheduling the imaging needed to have f/u appt with Yolie.  
1st Trimester Sonogram/20 Week Level II Sonogram/Follow up Sonogram for Growth

## 2025-02-08 NOTE — OB PROVIDER H&P - NSPREVIOUSLIVEBIRTHSNOWLIVING_OBGYN_ALL_OB_NU
You can access the FollowMyHealth Patient Portal offered by [facility] by registering at the following website: [email or web address]. By joining [employer]’s FollowMyHealth portal, you will also be able to view your health information using other applications (apps) compatible with our system.
1

## 2025-02-08 NOTE — OB PROVIDER TRIAGE NOTE - HISTORY OF PRESENT ILLNESS
Patient is a 29 year old  at 37w3d who presents to triage with complaint of lower abdominal pain and spotting since last night.  Denies heavy vaginal bleeding, leakage of fluid, decreased fetal movement, abdominal or pelvic pain, or any other concerns.  PNC with Dr Cavazos  PMhx: Renal stones - admitted for pain management at approx 20wks  Sxhx: removal of uterine septum, D&C x1  Meds: Progesterone earlier in pregnancy for short cervix, PNV  Allergies: NKDA  OBHx:  Preg 1 -  3/2022- First trimester SAB with D&C  Preg 2- 10/2022- First trimester SAB with pills  Preg 3- 2023- Full term  with short cervix  Gynhx: normal menses, one partner, no stds, no cysts, no fibroids, normal paps Patient is a 29 year old  at 37w3d who presents to triage with complaint of lower abdominal pain and spotting since last night.  Denies heavy vaginal bleeding, leakage of fluid, decreased fetal movement, abdominal or pelvic pain, or any other concerns.  PNC with Dr Cavazos  PMhx: Renal stones - admitted for pain management at approx 20wks  Sxhx: removal of uterine septum, D&C x1  Meds: Progesterone earlier in pregnancy for short cervix, PNV  Allergies: NKDA  OBHx:  Preg 1 -  3/2020- First trimester SAB with D&C  Preg - 10/2020- First trimester SAB with pills  Preg 3- 2022- Full term  with short cervix  Gynhx: normal menses, one partner, no stds, no cysts, no fibroids, normal paps

## 2025-02-08 NOTE — OB PROVIDER H&P - HISTORY OF PRESENT ILLNESS
Patient is a 29 year old  at 37w3d who presents to triage with complaint of lower abdominal pain and spotting since last night.  Denies heavy vaginal bleeding, leakage of fluid, decreased fetal movement, abdominal or pelvic pain, or any other concerns.  PNC with Dr Cavazos  PMhx: Renal stones - admitted for pain management at approx 20wks  Sxhx: removal of uterine septum, D&C x1  Meds: Progesterone earlier in pregnancy for short cervix, PNV  Allergies: NKDA  OBHx:  Preg 1 -  3/2020- First trimester SAB with D&C  Preg - 10/2020- First trimester SAB with pills  Preg 3- 2024- Full term  with short cervix  Gynhx: normal menses, one partner, no stds, no cysts, no fibroids, normal paps

## 2025-02-08 NOTE — OB RN TRIAGE NOTE - NS_SISCREENINGSR_GEN_ALL_ED
Mildly to Moderately Impaired: difficulty hearing in some environments or speaker may need to increase volume or speak distinctly Negative

## 2025-02-08 NOTE — OB PROVIDER H&P - NSLOWPPHRISK_OBGYN_A_OB
No previous uterine incision/Xiong Pregnancy/Less than or equal to 4 previous vaginal births/No known bleeding disorder/No history of postpartum hemorrhage/No other PPH risks indicated

## 2025-02-08 NOTE — OB RN PATIENT PROFILE - NS_PRENATALLABSOURCEGBSBACTPN_OBGYN_ALL_OB
Abdomen soft/No tenderness/Bowel sounds present and normal/No masses or organomegaly/No distension unknown

## 2025-02-08 NOTE — OB PROVIDER TRIAGE NOTE - NSICDXPASTSURGICALHX_GEN_ALL_CORE_FT
PAST SURGICAL HISTORY:  Status post hysteroscopic surgical removal of uterine septum     
detailed exam
none

## 2025-02-08 NOTE — OB PROVIDER LABOR PROGRESS NOTE - NS_OBIHIFHRDETAILS_OBGYN_ALL_OB_FT
baseline 130, moderate variability, +accels, no decels
baseline 130, moderate variability, +accels, no decels
baseline 120, moderate variability, +accels, no decels

## 2025-02-08 NOTE — OB RN TRIAGE NOTE - NS_TRIAGEPROVIDERNOTIFIED_OBGYN_ALL_OB_FT
Patient called with c/o Tanzeum is not covered by insurance too expensive   Covered meds are   Bydureon  Julius Irby   Please send new rx for one of the above medications that are covered .   [facility]     STEFANIE Batista

## 2025-02-08 NOTE — OB RN TRIAGE NOTE - FALL HARM RISK - UNIVERSAL INTERVENTIONS
Bed in lowest position, wheels locked, appropriate side rails in place/Call bell, personal items and telephone in reach/Instruct patient to call for assistance before getting out of bed or chair/Non-slip footwear when patient is out of bed/[place] to call system/Physically safe environment - no spills, clutter or unnecessary equipment/Purposeful Proactive Rounding/Room/bathroom lighting operational, light cord in reach

## 2025-02-08 NOTE — OB RN PATIENT PROFILE - FALL HARM RISK - UNIVERSAL INTERVENTIONS
Bed in lowest position, wheels locked, appropriate side rails in place/Call bell, personal items and telephone in reach/Instruct patient to call for assistance before getting out of bed or chair/Non-slip footwear when patient is out of bed/[place] to call system/Physically safe environment - no spills, clutter or unnecessary equipment/Purposeful Proactive Rounding/Room/bathroom lighting operational, light cord in reach Oral Minoxidil Counseling- I discussed with the patient the risks of oral minoxidil including but not limited to shortness of breath, swelling of the feet or ankles, dizziness, lightheadedness, unwanted hair growth and allergic reaction.  The patient verbalized understanding of the proper use and possible adverse effects of oral minoxidil.  All of the patient's questions and concerns were addressed.

## 2025-02-09 LAB
BASOPHILS # BLD AUTO: 0.03 K/UL — SIGNIFICANT CHANGE UP (ref 0–0.2)
BASOPHILS NFR BLD AUTO: 0.3 % — SIGNIFICANT CHANGE UP (ref 0–2)
EOSINOPHIL # BLD AUTO: 0.17 K/UL — SIGNIFICANT CHANGE UP (ref 0–0.5)
EOSINOPHIL NFR BLD AUTO: 1.6 % — SIGNIFICANT CHANGE UP (ref 0–6)
HCT VFR BLD CALC: 35.9 % — SIGNIFICANT CHANGE UP (ref 34.5–45)
HGB BLD-MCNC: 12.3 G/DL — SIGNIFICANT CHANGE UP (ref 11.5–15.5)
IMM GRANULOCYTES NFR BLD AUTO: 0.4 % — SIGNIFICANT CHANGE UP (ref 0–0.9)
LYMPHOCYTES # BLD AUTO: 1.81 K/UL — SIGNIFICANT CHANGE UP (ref 1–3.3)
LYMPHOCYTES # BLD AUTO: 17.1 % — SIGNIFICANT CHANGE UP (ref 13–44)
MCHC RBC-ENTMCNC: 33.1 PG — SIGNIFICANT CHANGE UP (ref 27–34)
MCHC RBC-ENTMCNC: 34.3 G/DL — SIGNIFICANT CHANGE UP (ref 32–36)
MCV RBC AUTO: 96.5 FL — SIGNIFICANT CHANGE UP (ref 80–100)
MONOCYTES # BLD AUTO: 1.06 K/UL — HIGH (ref 0–0.9)
MONOCYTES NFR BLD AUTO: 10 % — SIGNIFICANT CHANGE UP (ref 2–14)
NEUTROPHILS # BLD AUTO: 7.5 K/UL — HIGH (ref 1.8–7.4)
NEUTROPHILS NFR BLD AUTO: 70.6 % — SIGNIFICANT CHANGE UP (ref 43–77)
NRBC # BLD: 0 /100 WBCS — SIGNIFICANT CHANGE UP (ref 0–0)
NRBC BLD-RTO: 0 /100 WBCS — SIGNIFICANT CHANGE UP (ref 0–0)
PLATELET # BLD AUTO: 157 K/UL — SIGNIFICANT CHANGE UP (ref 150–400)
RBC # BLD: 3.72 M/UL — LOW (ref 3.8–5.2)
RBC # FLD: 12.5 % — SIGNIFICANT CHANGE UP (ref 10.3–14.5)
WBC # BLD: 10.61 K/UL — HIGH (ref 3.8–10.5)
WBC # FLD AUTO: 10.61 K/UL — HIGH (ref 3.8–10.5)

## 2025-02-09 RX ORDER — DOCUSATE SODIUM 100 MG
1 CAPSULE ORAL
Qty: 20 | Refills: 0
Start: 2025-02-09 | End: 2025-02-18

## 2025-02-09 RX ORDER — FAMOTIDINE 10 MG/ML
1 INJECTION INTRAVENOUS
Qty: 20 | Refills: 0
Start: 2025-02-09 | End: 2025-02-18

## 2025-02-09 RX ORDER — IBUPROFEN 600 MG/1
1 TABLET, FILM COATED ORAL
Qty: 20 | Refills: 0
Start: 2025-02-09 | End: 2025-02-13

## 2025-02-09 RX ORDER — SENNOSIDES 8.6 MG
2 TABLET ORAL
Qty: 60 | Refills: 0
Start: 2025-02-09 | End: 2025-03-10

## 2025-02-09 RX ORDER — PNV WITH CALCIUM NO.11/IRON/FA 65 MG-1 MG
1 TABLET ORAL
Qty: 30 | Refills: 3
Start: 2025-02-09 | End: 2025-06-08

## 2025-02-09 RX ORDER — IBUPROFEN 600 MG/1
600 TABLET, FILM COATED ORAL EVERY 6 HOURS
Refills: 0 | Status: DISCONTINUED | OUTPATIENT
Start: 2025-02-09 | End: 2025-02-10

## 2025-02-09 RX ADMIN — ACETAMINOPHEN 975 MILLIGRAM(S): 160 SUSPENSION ORAL at 15:50

## 2025-02-09 RX ADMIN — IBUPROFEN 600 MILLIGRAM(S): 600 TABLET, FILM COATED ORAL at 12:30

## 2025-02-09 RX ADMIN — IBUPROFEN 600 MILLIGRAM(S): 600 TABLET, FILM COATED ORAL at 18:35

## 2025-02-09 RX ADMIN — Medication 3 MILLILITER(S): at 07:13

## 2025-02-09 RX ADMIN — ACETAMINOPHEN 975 MILLIGRAM(S): 160 SUSPENSION ORAL at 22:42

## 2025-02-09 RX ADMIN — IBUPROFEN 600 MILLIGRAM(S): 600 TABLET, FILM COATED ORAL at 02:59

## 2025-02-09 RX ADMIN — ACETAMINOPHEN 975 MILLIGRAM(S): 160 SUSPENSION ORAL at 08:36

## 2025-02-09 RX ADMIN — IBUPROFEN 600 MILLIGRAM(S): 600 TABLET, FILM COATED ORAL at 17:39

## 2025-02-09 RX ADMIN — Medication 1 TABLET(S): at 11:37

## 2025-02-09 RX ADMIN — Medication 3 MILLILITER(S): at 02:46

## 2025-02-09 RX ADMIN — IBUPROFEN 600 MILLIGRAM(S): 600 TABLET, FILM COATED ORAL at 11:37

## 2025-02-09 RX ADMIN — ACETAMINOPHEN 975 MILLIGRAM(S): 160 SUSPENSION ORAL at 21:41

## 2025-02-09 RX ADMIN — ACETAMINOPHEN 975 MILLIGRAM(S): 160 SUSPENSION ORAL at 09:35

## 2025-02-09 RX ADMIN — ACETAMINOPHEN 975 MILLIGRAM(S): 160 SUSPENSION ORAL at 14:51

## 2025-02-09 RX ADMIN — IBUPROFEN 600 MILLIGRAM(S): 600 TABLET, FILM COATED ORAL at 04:00

## 2025-02-09 NOTE — DISCHARGE NOTE OB - HOSPITAL COURSE
30 y/o admitted in early labor, s/p  at 37 weeks 3 days. s/p methargine x 1    Uncomplicated delivery and postpartum course    Pt meeting hospital milestones and stable for discharge.

## 2025-02-09 NOTE — DISCHARGE NOTE OB - CARE PROVIDER_API CALL
Haley Cavazos (MD; MS)  Obstetrics and Gynecology  [address], [address] [place] [address]  Phone: [phone]  Fax: [phone]  Follow Up Time: 1 month

## 2025-02-09 NOTE — DISCHARGE NOTE OB - PATIENT PORTAL LINK FT
You can access the FollowMyHealth Patient Portal offered by [facility] by registering at the following website: [email or web address]. By joining [facility]’s FollowMyHealth portal, you will also be able to view your health information using other applications (apps) compatible with our system.

## 2025-02-09 NOTE — DISCHARGE NOTE OB - FINANCIAL ASSISTANCE
[facility] provides services at a reduced cost to those who are determined to be eligible through [facility]’s financial assistance program. Information regarding [facility]’s financial assistance program can be found by going to [email or web address] or by calling [phone].

## 2025-02-09 NOTE — DISCHARGE NOTE OB - MEDICATION SUMMARY - MEDICATIONS TO STOP TAKING
I will STOP taking the medications listed below when I get home from the hospital:    oseltamivir 75 mg oral capsule  -- 1 cap(s) by mouth 2 times a day

## 2025-02-09 NOTE — DISCHARGE NOTE OB - NS MD DC FALL RISK RISK
For information on Fall & Injury Prevention, visit: [email or web address] OR  [email or web address] OR  https://www.cdc.gov/steadi/patient.html

## 2025-02-09 NOTE — PROGRESS NOTE ADULT - ASSESSMENT
PA NOTE:  PPD#1  s/p:  at 37 3/7weeks     in the room pt doing well      -Continue pain management  -Encourage OOB and ambulation  -encourage breastfeeding   -Plan for discharge tomorrow  -d/w dr felix

## 2025-02-09 NOTE — DISCHARGE NOTE OB - MEDICATION SUMMARY - MEDICATIONS TO TAKE
I will START or STAY ON the medications listed below when I get home from the hospital:    ibuprofen 600 mg oral tablet  -- 1 tab(s) by mouth every 6 hours as needed for , Mild pain or headache  -- Indication: For pain    Tylenol Extra Strength 500 mg oral tablet  -- 2 tab(s) by mouth every 8 hours as needed for  mild pain  -- Indication: For pain    famotidine 20 mg oral tablet  -- 1 tab(s) by mouth 2 times a day  -- Indication: For acid reflux    PreNatal Low Iron oral tablet  -- 1 tab(s) by mouth once a day  -- Indication: For Supplement    senna leaf extract oral tablet  -- 2 tab(s) by mouth once a day  -- Indication: For constipation    Colace 100 mg oral capsule  -- 1 cap(s) by mouth 2 times a day  -- Indication: For constipation

## 2025-02-09 NOTE — DISCHARGE NOTE OB - MATERIALS PROVIDED
Vaccinations/  Immunization Record/Guide to Postpartum Care/[facility] Hearing Screen Program/Back To Sleep Handout/Shaken Baby Prevention Handout/Breastfeeding Guide and Packet/Birth Certificate Instructions/Letter of Medical Neccessity

## 2025-02-10 ENCOUNTER — APPOINTMENT (OUTPATIENT)
Dept: OBGYN | Facility: CLINIC | Age: 30
End: 2025-02-10

## 2025-02-10 VITALS
SYSTOLIC BLOOD PRESSURE: 101 MMHG | DIASTOLIC BLOOD PRESSURE: 67 MMHG | OXYGEN SATURATION: 97 % | TEMPERATURE: 98 F | RESPIRATION RATE: 18 BRPM | HEART RATE: 79 BPM

## 2025-02-10 PROCEDURE — 85384 FIBRINOGEN ACTIVITY: CPT

## 2025-02-10 PROCEDURE — 85730 THROMBOPLASTIN TIME PARTIAL: CPT

## 2025-02-10 PROCEDURE — 86780 TREPONEMA PALLIDUM: CPT

## 2025-02-10 PROCEDURE — 85610 PROTHROMBIN TIME: CPT

## 2025-02-10 PROCEDURE — 36415 COLL VENOUS BLD VENIPUNCTURE: CPT

## 2025-02-10 PROCEDURE — 85025 COMPLETE CBC W/AUTO DIFF WBC: CPT

## 2025-02-10 PROCEDURE — 86901 BLOOD TYPING SEROLOGIC RH(D): CPT

## 2025-02-10 PROCEDURE — 86900 BLOOD TYPING SEROLOGIC ABO: CPT

## 2025-02-10 PROCEDURE — 86850 RBC ANTIBODY SCREEN: CPT

## 2025-02-10 PROCEDURE — 59050 FETAL MONITOR W/REPORT: CPT

## 2025-02-10 RX ORDER — ACETAMINOPHEN 160 MG/5ML
2 SUSPENSION ORAL
Qty: 30 | Refills: 0
Start: 2025-02-10 | End: 2025-02-14

## 2025-02-10 RX ADMIN — IBUPROFEN 600 MILLIGRAM(S): 600 TABLET, FILM COATED ORAL at 06:35

## 2025-02-10 RX ADMIN — IBUPROFEN 600 MILLIGRAM(S): 600 TABLET, FILM COATED ORAL at 05:26

## 2025-02-10 RX ADMIN — Medication 3 MILLILITER(S): at 00:26

## 2025-02-10 RX ADMIN — Medication 3 MILLILITER(S): at 06:33

## 2025-02-10 NOTE — PROGRESS NOTE ADULT - SUBJECTIVE AND OBJECTIVE BOX
PA NOTE:  PPD#1  s/p:  at 37 3/7weeks     in the room pt doing well    Patient seen at bedside resting comfortably offers no current complaints. Ambulating and voiding without difficulty.  Passing flatus and tolerating regular diet.  both breast/bottle feeding . Denies HA, CP, SOB, N/V/D, dizziness, palpitations, worsening abdominal pain, worsening vaginal bleeding, or any other concerns.      Vital Signs Last 24 Hrs  T(C): 36.4 (2025 06:22), Max: 37.4 (2025 16:40)  T(F): 97.5 (2025 06:22), Max: 99.3 (2025 16:40)  HR: 75 (2025 06:22) (61 - 103)  BP: 102/67 (2025 06:22) (100/60 - 139/85)  BP(mean): 78 (2025 14:50) (74 - 103)  RR: 18 (2025 06:22) (16 - 18)  SpO2: 98% (2025 06:22) (97% - 98%)    Parameters below as of 2025 06:22  Patient On (Oxygen Delivery Method): room air        Physical Exam:     Gen: A&Ox 3, NAD  Chest: CTA B/L  Cardiac: S1+S2; RRR  Breast: Soft, nontender, nonengorged  Abdomen: +Bs; Soft, nontender,  ND; Fundus firm below umbilicus  Gyn: mod lochia  perineum: 1st deg repaired after delivery   Ext: Nontender, DTRS 2+, no worsening edema                            12.3   10.61 )-----------( 157      ( 2025 06:24 )             35.9     
PA progress note  Patient seen at bedside, offers no current complaints  Resting comfortably with baby in the room  Ambulating and voiding without difficulty.  Passing flatus and tolerating regular diet.  Both breast/bottle feeding.  Denies HA, CP, SOB, N/V/D, dizziness, palpitations, worsening abdominal pain, worsening vaginal bleeding, or any other concerns.      Vital Signs Last 24 Hrs  T(C): 36.5 (10 Feb 2025 05:49), Max: 36.5 (10 Feb 2025 05:49)  T(F): 97.7 (10 Feb 2025 05:49), Max: 97.7 (10 Feb 2025 05:49)  HR: 79 (10 Feb 2025 05:49) (70 - 79)  BP: 101/67 (10 Feb 2025 05:49) (101/67 - 104/61)  RR: 18 (10 Feb 2025 05:49) (17 - 18)  SpO2: 97% (10 Feb 2025 05:49) (97% - 99%)    Parameters below as of 10 Feb 2025 05:49  Patient On (Oxygen Delivery Method): room air      Physical Exam:     Gen: A&Ox 3, NAD  Chest: CTA B/L  Cardiac: S1,S2; RRR  Breast: Soft, nontender, nonengorged  Abdomen: +BS; Soft, nontender, ND; Fundus firm below umbilicus  Gyn: Min lochia  Ext: Nontender, DTRS 2+, no worsening edema                          12.3   10.61 )-----------( 157      ( 09 Feb 2025 06:24 )             35.9

## 2025-02-10 NOTE — PROGRESS NOTE ADULT - PROBLEM SELECTOR PLAN 1
- Discharge home with instructions  - Follow up in office in 5-6 weeks for postpartum visit  - Breastfeeding encouraged  - d/w dr felix

## 2025-02-10 NOTE — LACTATION INITIAL EVALUATION - LACTATION INTERVENTIONS
Responsive breastfeeding encouraged with 10-12 feedings in 24 hour period. Educated on diaper counts to assess for adequate intake, safe skin to skin practices and rooming in. Parent requests formula. Explored reasons and discussed risks of supplementing while breastfeeding without medical reason. Parent still chose to supplement with formula. Explained risks of using artificial nipples and discussed options for using alternative feeding methods instead of nipples. Safe formula preparation and patient Guide to Formula Feeding provided and reviewed. Attended breastfeeding, postpartum and  care class today. Patientst's questions/concerns regarding breastfeeding, general self and 's care addressed. Educated on Shaken Baby Syndrome and Safe Sleep Practices. Danger signs after birth and Hand Expression videos viewed from [employer]. Additional community breastfeeding resources given - [facility] Breastfeeding Warmline, [employer] x2 Locations./initiate/review safe skin-to-skin/initiate/review hand expression/initiate/review pumping guidelines and safe milk handling/initiate/review techniques for position and latch/post discharge community resources provided/review techniques to increase milk supply/review techniques to manage sore nipples/engorgement/initiate/review breast massage/compression/reviewed components of an effective feeding and at least 8 effective feedings per day required/reviewed importance of monitoring infant diapers, the breastfeeding log, and minimum output each day/reviewed strategies to transition to breastfeeding only/reviewed benefits and recommendations for rooming in/reviewed feeding on demand/by cue at least 8 times a day/recommended follow-up with pediatrician within 24 hours of discharge/reviewed indications of inadequate milk transfer that would require supplementation

## 2025-02-13 ENCOUNTER — APPOINTMENT (OUTPATIENT)
Dept: ANTEPARTUM | Facility: CLINIC | Age: 30
End: 2025-02-13

## 2025-03-17 ENCOUNTER — NON-APPOINTMENT (OUTPATIENT)
Age: 30
End: 2025-03-17

## 2025-03-17 ENCOUNTER — APPOINTMENT (OUTPATIENT)
Dept: OBGYN | Facility: CLINIC | Age: 30
End: 2025-03-17
Payer: COMMERCIAL

## 2025-03-17 VITALS
OXYGEN SATURATION: 98 % | HEART RATE: 70 BPM | DIASTOLIC BLOOD PRESSURE: 78 MMHG | BODY MASS INDEX: 29.41 KG/M2 | WEIGHT: 166 LBS | SYSTOLIC BLOOD PRESSURE: 116 MMHG

## 2025-03-17 DIAGNOSIS — Z87.42 PERSONAL HISTORY OF OTHER DISEASES OF THE FEMALE GENITAL TRACT: ICD-10-CM

## 2025-03-17 DIAGNOSIS — Z34.91 ENCOUNTER FOR SUPERVISION OF NORMAL PREGNANCY, UNSPECIFIED, FIRST TRIMESTER: ICD-10-CM

## 2025-03-17 DIAGNOSIS — O09.92 SUPERVISION OF HIGH RISK PREGNANCY, UNSPECIFIED, SECOND TRIMESTER: ICD-10-CM

## 2025-03-17 PROCEDURE — 99213 OFFICE O/P EST LOW 20 MIN: CPT | Mod: TH

## 2025-09-15 ENCOUNTER — APPOINTMENT (OUTPATIENT)
Dept: OBGYN | Facility: CLINIC | Age: 30
End: 2025-09-15